# Patient Record
Sex: MALE | Race: WHITE | Employment: OTHER | URBAN - METROPOLITAN AREA
[De-identification: names, ages, dates, MRNs, and addresses within clinical notes are randomized per-mention and may not be internally consistent; named-entity substitution may affect disease eponyms.]

---

## 2018-05-24 LAB
ESTIMATED AVERAGE GLUCOSE: 111 MG/DL
HBA1C MFR BLD: 5.5 % (ref 4.4–6.4)
HCT VFR BLD CALC: 43.8 % (ref 40–49)
HEMOGLOBIN: 14.3 GM/DL (ref 13.5–16.5)
MCH RBC QN AUTO: 30.5 PG (ref 27.5–33)
MCHC RBC AUTO-ENTMCNC: 32.7 GM/DL (ref 33–36)
MCV RBC AUTO: 93.4 CU MIC (ref 80–97)
PDW BLD-RTO: 13.7 % (ref 12–16)
PLATELET # BLD: 155 TH/CMM (ref 150–400)
RBC # BLD: 4.69 MIL/CMM (ref 4.5–6)
WBC # BLD: 6.5 TH/CMM (ref 4.4–10.5)

## 2019-08-12 ENCOUNTER — HOSPITAL ENCOUNTER (OUTPATIENT)
Age: 69
Setting detail: SPECIMEN
Discharge: HOME | End: 2019-08-12
Payer: MEDICARE

## 2019-08-14 LAB
CULTURE: ABNORMAL
DIRECT EXAM: ABNORMAL
DIRECT EXAM: ABNORMAL
Lab: ABNORMAL
SPECIMEN DESCRIPTION: ABNORMAL

## 2020-03-11 ENCOUNTER — HOSPITAL ENCOUNTER (OUTPATIENT)
Age: 70
Setting detail: SPECIMEN
Discharge: HOME | End: 2020-03-11
Payer: MEDICARE

## 2021-10-04 ENCOUNTER — HOSPITAL ENCOUNTER (EMERGENCY)
Age: 71
Discharge: HOME | End: 2021-10-04
Payer: MEDICARE

## 2021-10-04 ENCOUNTER — APPOINTMENT (OUTPATIENT)
Dept: GENERAL RADIOLOGY | Age: 71
End: 2021-10-04
Payer: MEDICARE

## 2021-10-04 VITALS
WEIGHT: 173 LBS | TEMPERATURE: 98 F | HEART RATE: 61 BPM | DIASTOLIC BLOOD PRESSURE: 87 MMHG | BODY MASS INDEX: 25.62 KG/M2 | RESPIRATION RATE: 19 BRPM | SYSTOLIC BLOOD PRESSURE: 178 MMHG | OXYGEN SATURATION: 98 % | HEIGHT: 69 IN

## 2021-10-04 DIAGNOSIS — S62.610B OPEN DISPLACED FRACTURE OF PROXIMAL PHALANX OF RIGHT INDEX FINGER, INITIAL ENCOUNTER: Primary | ICD-10-CM

## 2021-10-04 PROCEDURE — 29130 APPL FINGER SPLINT STATIC: CPT

## 2021-10-04 PROCEDURE — 99213 OFFICE O/P EST LOW 20 MIN: CPT | Performed by: EMERGENCY MEDICINE

## 2021-10-04 PROCEDURE — 99214 OFFICE O/P EST MOD 30 MIN: CPT

## 2021-10-04 PROCEDURE — 96372 THER/PROPH/DIAG INJ SC/IM: CPT

## 2021-10-04 PROCEDURE — 73130 X-RAY EXAM OF HAND: CPT

## 2021-10-04 PROCEDURE — 6360000002 HC RX W HCPCS: Performed by: EMERGENCY MEDICINE

## 2021-10-04 RX ORDER — CEFAZOLIN SODIUM 1 G/3ML
1000 INJECTION, POWDER, FOR SOLUTION INTRAMUSCULAR; INTRAVENOUS ONCE
Status: COMPLETED | OUTPATIENT
Start: 2021-10-04 | End: 2021-10-04

## 2021-10-04 RX ORDER — LIDOCAINE HYDROCHLORIDE 10 MG/ML
INJECTION, SOLUTION EPIDURAL; INFILTRATION; INTRACAUDAL; PERINEURAL
Status: DISCONTINUED
Start: 2021-10-04 | End: 2021-10-04 | Stop reason: HOSPADM

## 2021-10-04 RX ADMIN — CEFAZOLIN 1000 MG: 1 INJECTION, POWDER, FOR SOLUTION INTRAMUSCULAR; INTRAVENOUS at 13:03

## 2021-10-04 ASSESSMENT — ENCOUNTER SYMPTOMS: COUGH: 0

## 2021-10-04 NOTE — ED PROVIDER NOTES
[facility]  Urgent Care Encounter       CHIEF COMPLAINT       Chief Complaint   Patient presents with    Hand Injury       Nurses Notes reviewed and I agree except as noted in the HPI. HISTORY OF PRESENT ILLNESS   Kareen Summers is a 66 y.o. male who presents for complaints of an injury to his right index finger. Patient states that he was using a pulley system at a local gym. When he pulled the pin out to adjust the weight, the bar that he used to pull down came crashing down and pinched his finger between the seat and the bar. Patient has a deformity to the right index finger. There is a laceration to the dorsum of the right index finger. Patient is having difficulty flexing and extending his right index finger. He states the tip of his finger feels numb. He reports the incident happened approximately 10:30 AM this morning. HPI    REVIEW OF SYSTEMS     Review of Systems   Constitutional: Negative for diaphoresis, fatigue and fever. Respiratory: Negative for cough. Cardiovascular: Negative for chest pain. Musculoskeletal: Positive for arthralgias (right index finger ). Skin: Positive for wound (laceration right index finger  ). Neurological: Negative for dizziness and light-headedness. Psychiatric/Behavioral: Negative for behavioral problems. PAST MEDICAL HISTORY         Diagnosis Date    Arrhythmia     Ganglion cyst     GERD (gastroesophageal reflux disease) 4/15/2014    Headache 1/15/2014    Hyperlipidemia     Hypertension     Major depression 12/15/2014    Osteoarthritis     Restless legs syndrome     RLS (restless legs syndrome)        SURGICALHISTORY     Patient  has a past surgical history that includes Testicle surgery; Cholecystectomy, laparoscopic; Cholecystectomy, laparoscopic; back surgery; and Neck surgery.     CURRENT MEDICATIONS       Previous Medications    AMLODIPINE (NORVASC) 5 MG TABLET    TAKE ONE TABLET BY MOUTH ONCE DAILY ASPIRIN 325 MG TABLET    Take 325 mg by mouth daily. BENAZEPRIL (LOTENSIN) 40 MG TABLET    Take 1 tablet by mouth daily    BUPROPION (WELLBUTRIN) 75 MG TABLET    TAKE ONE TABLET BY MOUTH ONCE DAILY    EZETIMIBE (ZETIA) 10 MG TABLET    Take 10 mg by mouth daily    FISH OIL-OMEGA-3 FATTY ACIDS 1000 MG CAPSULE    Take 1,000 mg by mouth 2 times daily. GABAPENTIN (NEURONTIN) 300 MG CAPSULE    TAKE ONE CAPSULE BY MOUTH THREE TIMES DAILY    GLUCOSAMINE SULFATE 750 MG TABS    Take 3 tablets by mouth daily. HYDROCODONE-ACETAMINOPHEN (NORCO) 5-325 MG PER TABLET    Take 1 tablet by mouth every 8 hours as needed for Pain    ISOSORBIDE MONONITRATE (IMDUR) 30 MG EXTENDED RELEASE TABLET    Take 1 tablet by mouth daily    MECLIZINE (ANTIVERT) 25 MG TABLET    TAKE ONE TABLET BY MOUTH EVERY 6 HOURS AS NEEDED FOR DIZZINESS    MELATONIN 3 MG TABS TABLET    Take 1 tablet by mouth daily    NITROGLYCERIN (NITROSTAT) 0.4 MG SL TABLET    Place 1 tablet under the tongue every 5 minutes as needed    PANTOPRAZOLE (PROTONIX) 40 MG TABLET    Take 40 mg by mouth daily    PRAVASTATIN (PRAVACHOL) 40 MG TABLET    TAKE ONE TABLET BY MOUTH ONCE DAILY    RANITIDINE (ZANTAC) 150 MG TABLET    TAKE 1 TABLET TWICE DAILY    SERTRALINE (ZOLOFT) 50 MG TABLET    Take 1 tablet by mouth daily    SOTALOL (BETAPACE) 80 MG TABLET    Take 1 tablet by mouth daily. THERAPEUTIC MULTIVITAMIN-MINERALS (THERAGRAN-M) TABLET    Take 1 tablet by mouth daily. ALLERGIES     Patient is is allergic to sildenafil citrate, baycol [cerivastatin sodium], cetirizine & related, cialis [tadalafil], crestor [rosuvastatin calcium], lipitor, zocor [simvastatin], and ropinirole hcl.     Patients   Immunization History   Administered Date(s) Administered    COVID-19, Moderna, PF, 100mcg/0.5mL 02/22/2021, 03/22/2021    Influenza Whole 09/29/2011    Pneumococcal Conjugate 13-valent (Garnett Brunner) 07/14/2016    Tdap (Boostrix, Adacel) 07/14/2016    Zoster Live (Zostavax) 07/14/2016       FAMILY HISTORY     Patient's family history includes Cancer in his brother and sister; Heart Disease in his father and mother; High Blood Pressure in his father and mother. SOCIAL HISTORY     Patient  reports that he has never smoked. He has never used smokeless tobacco. He reports that he does not drink alcohol and does not use drugs. PHYSICAL EXAM     ED TRIAGE VITALS  BP: (!) 178/87, Temp: 98 °F (36.7 °C), Pulse: 61, Resp: 19, SpO2: 98 %,Estimated body mass index is 25.55 kg/m² as calculated from the following:    Height as of this encounter: 5' 9\" (1.753 m). Weight as of this encounter: 173 lb (78.5 kg). ,No LMP for male patient. Physical Exam  Constitutional:       General: He is not in acute distress. Appearance: He is normal weight. He is not ill-appearing. HENT:      Head: Normocephalic. Cardiovascular:      Rate and Rhythm: Normal rate. Pulses: Normal pulses. Pulmonary:      Effort: Pulmonary effort is normal.   Musculoskeletal:      Right hand: Deformity (right index finger) and tenderness present. Decreased range of motion. Hands:    Skin:     General: Skin is warm and dry. Capillary Refill: Capillary refill takes less than 2 seconds. Neurological:      General: No focal deficit present. Mental Status: He is alert. Psychiatric:         Mood and Affect: Mood normal.         Behavior: Behavior normal.         DIAGNOSTIC RESULTS     Labs:No results found for this visit on 10/04/21. IMAGING:    XR HAND RIGHT (MIN 3 VIEWS)   Final Result   Comminuted displaced angulated fracture proximal phalanx second digit            **This report has been created using voice recognition software. It may contain minor errors which are inherent in voice recognition technology. **      Final report electronically signed by Dr. Tc Weeks on 10/4/2021 1:08 PM            EKG:      URGENT CARE COURSE:     Vitals:    10/04/21 1239   BP: (!) 178/87 Pulse: 61   Resp: 19   Temp: 98 °F (36.7 °C)   SpO2: 98%   Weight: 173 lb (78.5 kg)   Height: 5' 9\" (1.753 m)       Medications   lidocaine PF 1 % injection (has no administration in time range)   ceFAZolin (ANCEF) injection 1,000 mg (1,000 mg IntraMUSCular Given 10/4/21 1303)            PROCEDURES:  None    FINAL IMPRESSION      1. Open displaced fracture of proximal phalanx of right index finger, initial encounter          DISPOSITION/ PLAN     Patient presents for open fracture right index finger, proximal phalanx. I contacted Raquel Brunner, NP orthopedics. I advised that we administered Ancef at the urgent care. She advised to send the patient to walk-in clinic at orthopedic [facility] for further care. Patient was placed in AlumaFoam splint in a position of comfort. Patient will be discharged and sent to orthopedic [place] advised not to eat or drink anything prior to being seen.       PATIENT REFERRED TO:  Erin Hooker MD  [address] [place] [address] / [facility] [id number]      DISCHARGE MEDICATIONS:  New Prescriptions    No medications on file       Discontinued Medications    No medications on file       Current Discharge Medication List          Sonia Cloud, APRN - CNP    (Please note that portions of this note were completed with a voice recognition program. Efforts were made to edit the dictations but occasionally words are mis-transcribed.)          CHACHA Ferrer CNP  10/04/21 8705

## 2021-10-04 NOTE — ED TRIAGE NOTES
Pt presents to  with c/o right finger injury following working out at the gym today around 1000. Pt reports he was adjusting the weight on the pull down bar when the pull down bar fell, smashing his finger. Pt presents with a laceration to finger and a deformity. Pt cannot bend or straighten finger upon assessment. Pt does report numbness and tingling.

## 2021-12-23 ENCOUNTER — PROCEDURE VISIT (OUTPATIENT)
Dept: NEUROLOGY | Age: 71
End: 2021-12-23
Payer: MEDICARE

## 2021-12-23 DIAGNOSIS — R20.0 BILATERAL HAND NUMBNESS: Primary | ICD-10-CM

## 2021-12-23 DIAGNOSIS — M54.12 CERVICAL RADICULOPATHY: ICD-10-CM

## 2021-12-23 DIAGNOSIS — G56.03 BILATERAL CARPAL TUNNEL SYNDROME: ICD-10-CM

## 2021-12-23 PROCEDURE — 95886 MUSC TEST DONE W/N TEST COMP: CPT | Performed by: PSYCHIATRY & NEUROLOGY

## 2021-12-23 PROCEDURE — 95911 NRV CNDJ TEST 9-10 STUDIES: CPT | Performed by: PSYCHIATRY & NEUROLOGY

## 2022-05-23 ENCOUNTER — HOSPITAL ENCOUNTER (OUTPATIENT)
Age: 72
Setting detail: SPECIMEN
Discharge: HOME | End: 2022-05-23

## 2022-05-24 LAB
CRYPTOSPORIDIUM ANTIGEN STOOL: NEGATIVE
GIARDIA ANTIGEN STOOL: NEGATIVE
SOURCE: NORMAL

## 2022-10-06 ENCOUNTER — HOSPITAL ENCOUNTER (OUTPATIENT)
Age: 72
Setting detail: SPECIMEN
Discharge: HOME | End: 2022-10-06

## 2022-10-08 LAB
CULTURE: ABNORMAL
CULTURE: ABNORMAL
DIRECT EXAM: ABNORMAL
DIRECT EXAM: ABNORMAL
SPECIMEN DESCRIPTION: ABNORMAL

## 2023-02-23 ENCOUNTER — HOSPITAL ENCOUNTER (OUTPATIENT)
Age: 73
Setting detail: SPECIMEN
Discharge: HOME | End: 2023-02-23

## 2023-02-23 LAB
ALBUMIN SERPL-MCNC: 4.3 G/DL (ref 3.5–5.2)
ALBUMIN/GLOBULIN RATIO: 1.5 (ref 1–2.5)
ALP SERPL-CCNC: 112 U/L (ref 40–129)
ALT SERPL-CCNC: 16 U/L (ref 5–41)
ANION GAP SERPL CALCULATED.3IONS-SCNC: 11 MMOL/L (ref 9–17)
AST SERPL-CCNC: 21 U/L
BILIRUB SERPL-MCNC: 0.5 MG/DL (ref 0.3–1.2)
BUN SERPL-MCNC: 17 MG/DL (ref 8–23)
CALCIUM SERPL-MCNC: 9.2 MG/DL (ref 8.6–10.4)
CHLORIDE SERPL-SCNC: 106 MMOL/L (ref 98–107)
CO2 SERPL-SCNC: 25 MMOL/L (ref 20–31)
CREAT SERPL-MCNC: 1.15 MG/DL (ref 0.7–1.2)
CRP SERPL HS-MCNC: 4.4 MG/L (ref 0–5)
ERYTHROCYTE [SEDIMENTATION RATE] IN BLOOD BY WESTERGREN METHOD: 2 MM/HR (ref 0–20)
GFR SERPL CREATININE-BSD FRML MDRD: >60 ML/MIN/1.73M2
GLUCOSE SERPL-MCNC: 85 MG/DL (ref 70–99)
POTASSIUM SERPL-SCNC: 4.9 MMOL/L (ref 3.7–5.3)
PROT SERPL-MCNC: 7.1 G/DL (ref 6.4–8.3)
RHEUMATOID FACTOR: <10 IU/ML
SODIUM SERPL-SCNC: 142 MMOL/L (ref 135–144)

## 2023-02-25 LAB
ANA SER QL IA: NEGATIVE
DSDNA IGG SER QL IA: 1.1 IU/ML
NUCLEAR IGG SER IA-RTO: 0.2 U/ML

## 2023-06-20 ENCOUNTER — HOSPITAL ENCOUNTER (OUTPATIENT)
Age: 73
Setting detail: SPECIMEN
Discharge: HOME | End: 2023-06-20

## 2023-06-20 LAB
ALBUMIN SERPL-MCNC: 4.1 G/DL (ref 3.5–5.2)
ALBUMIN/GLOB SERPL: 1.4 {RATIO} (ref 1–2.5)
ALP SERPL-CCNC: 93 U/L (ref 40–129)
ALT SERPL-CCNC: 13 U/L (ref 5–41)
ANION GAP SERPL CALCULATED.3IONS-SCNC: 15 MMOL/L (ref 9–17)
AST SERPL-CCNC: 15 U/L
BILIRUB SERPL-MCNC: 0.6 MG/DL (ref 0.3–1.2)
BUN SERPL-MCNC: 26 MG/DL (ref 8–23)
CALCIUM SERPL-MCNC: 9.5 MG/DL (ref 8.6–10.4)
CHLORIDE SERPL-SCNC: 108 MMOL/L (ref 98–107)
CHOLEST SERPL-MCNC: 139 MG/DL
CHOLESTEROL/HDL RATIO: 3
CO2 SERPL-SCNC: 22 MMOL/L (ref 20–31)
CREAT SERPL-MCNC: 1.17 MG/DL (ref 0.7–1.2)
ERYTHROCYTE [DISTWIDTH] IN BLOOD BY AUTOMATED COUNT: 12.4 % (ref 11.8–14.4)
FOLATE SERPL-MCNC: >20 NG/ML
GFR SERPL CREATININE-BSD FRML MDRD: >60 ML/MIN/1.73M2
GLUCOSE SERPL-MCNC: 115 MG/DL (ref 70–99)
HCT VFR BLD AUTO: 48 % (ref 40.7–50.3)
HDLC SERPL-MCNC: 47 MG/DL
HGB BLD-MCNC: 15.3 G/DL (ref 13–17)
LDLC SERPL CALC-MCNC: 59 MG/DL (ref 0–130)
MCH RBC QN AUTO: 29.6 PG (ref 25.2–33.5)
MCHC RBC AUTO-ENTMCNC: 31.9 G/DL (ref 28.4–34.8)
MCV RBC AUTO: 92.8 FL (ref 82.6–102.9)
NRBC AUTOMATED: 0 PER 100 WBC
PLATELET # BLD AUTO: 193 K/UL (ref 138–453)
PMV BLD AUTO: 12 FL (ref 8.1–13.5)
POTASSIUM SERPL-SCNC: 4.8 MMOL/L (ref 3.7–5.3)
PROT SERPL-MCNC: 7.1 G/DL (ref 6.4–8.3)
PSA SERPL-MCNC: 3.49 NG/ML
RBC # BLD AUTO: 5.17 M/UL (ref 4.21–5.77)
SODIUM SERPL-SCNC: 145 MMOL/L (ref 135–144)
TRIGL SERPL-MCNC: 166 MG/DL
TSH SERPL-MCNC: 2.5 UIU/ML (ref 0.3–5)
VIT B12 SERPL-MCNC: 1998 PG/ML (ref 232–1245)
WBC OTHER # BLD: 7.8 K/UL (ref 3.5–11.3)

## 2024-05-10 ENCOUNTER — HOSPITAL ENCOUNTER (OUTPATIENT)
Age: 74
End: 2024-05-10

## 2024-08-26 ENCOUNTER — OFFICE VISIT (OUTPATIENT)
Dept: ENT CLINIC | Age: 74
End: 2024-08-26
Payer: MEDICARE

## 2024-08-26 ENCOUNTER — TELEPHONE (OUTPATIENT)
Dept: ENT CLINIC | Age: 74
End: 2024-08-26

## 2024-08-26 VITALS
SYSTOLIC BLOOD PRESSURE: 136 MMHG | DIASTOLIC BLOOD PRESSURE: 74 MMHG | HEART RATE: 65 BPM | WEIGHT: 187.4 LBS | HEIGHT: 69 IN | RESPIRATION RATE: 14 BRPM | OXYGEN SATURATION: 97 % | BODY MASS INDEX: 27.76 KG/M2 | TEMPERATURE: 97.7 F

## 2024-08-26 DIAGNOSIS — J39.8 TRACHEOBRONCHOMALACIA DETERMINED BY BRONCHOSCOPY: ICD-10-CM

## 2024-08-26 DIAGNOSIS — R06.09 DYSPNEA ON EXERTION: Primary | ICD-10-CM

## 2024-08-26 PROCEDURE — 3078F DIAST BP <80 MM HG: CPT | Performed by: OTOLARYNGOLOGY

## 2024-08-26 PROCEDURE — 1036F TOBACCO NON-USER: CPT | Performed by: OTOLARYNGOLOGY

## 2024-08-26 PROCEDURE — 3017F COLORECTAL CA SCREEN DOC REV: CPT | Performed by: OTOLARYNGOLOGY

## 2024-08-26 PROCEDURE — 1123F ACP DISCUSS/DSCN MKR DOCD: CPT | Performed by: OTOLARYNGOLOGY

## 2024-08-26 PROCEDURE — G8428 CUR MEDS NOT DOCUMENT: HCPCS | Performed by: OTOLARYNGOLOGY

## 2024-08-26 PROCEDURE — G8419 CALC BMI OUT NRM PARAM NOF/U: HCPCS | Performed by: OTOLARYNGOLOGY

## 2024-08-26 PROCEDURE — 99203 OFFICE O/P NEW LOW 30 MIN: CPT | Performed by: OTOLARYNGOLOGY

## 2024-08-26 PROCEDURE — 3075F SYST BP GE 130 - 139MM HG: CPT | Performed by: OTOLARYNGOLOGY

## 2024-08-26 RX ORDER — AMITRIPTYLINE HYDROCHLORIDE 50 MG/1
50 TABLET ORAL NIGHTLY
COMMUNITY
Start: 2024-07-09

## 2024-08-26 RX ORDER — SOTALOL HYDROCHLORIDE 80 MG/1
80 TABLET ORAL 2 TIMES DAILY
COMMUNITY

## 2024-08-26 RX ORDER — OMEPRAZOLE 40 MG/1
40 CAPSULE, DELAYED RELEASE ORAL DAILY
COMMUNITY

## 2024-08-26 RX ORDER — SULFASALAZINE 500 MG/1
500 TABLET ORAL DAILY
COMMUNITY
Start: 2024-07-18

## 2024-08-26 RX ORDER — HYDROXYZINE HYDROCHLORIDE 10 MG/1
TABLET, FILM COATED ORAL
COMMUNITY

## 2024-08-26 RX ORDER — PRASUGREL 10 MG/1
TABLET, FILM COATED ORAL
COMMUNITY
Start: 2024-08-25

## 2024-08-26 RX ORDER — TERBINAFINE HYDROCHLORIDE 250 MG/1
250 TABLET ORAL DAILY
COMMUNITY
Start: 2024-06-24

## 2024-08-26 RX ORDER — PHENOL 1.4 %
1 AEROSOL, SPRAY (ML) MUCOUS MEMBRANE DAILY
COMMUNITY

## 2024-08-26 RX ORDER — ISOSORBIDE MONONITRATE 60 MG/1
60 TABLET, EXTENDED RELEASE ORAL EVERY MORNING
COMMUNITY
Start: 2024-07-18

## 2024-08-26 RX ORDER — GABAPENTIN 400 MG/1
400 CAPSULE ORAL 3 TIMES DAILY
COMMUNITY
Start: 2024-08-09 | End: 2024-08-26

## 2024-08-26 NOTE — PROGRESS NOTES
coughing or inspiratory stridor.  On anterior inspection the patient's nasal airways were bilaterally patent with mild to moderate nasal septal deviation to the right and bilateral inferior turbinate hypertrophy without obstruction.  He had positive nasal valving with the right side collapsing less than the left because of less airflow.  On anterior inspection the patient had a class I Mallampati aperture.  His neck was free of adenopathy and thyromegaly.  His breath sounds were distant and polyphonic rhonchi were vaguely audible right worse than left.  He was heard to cough multiple times and some of his coughing was rhonchorous.    Vitals reviewed.    No results found.   Lab Results   Component Value Date/Time     05/13/2024 09:20 AM     02/14/2024 10:35 AM     02/13/2024 09:29 AM    K 4.4 05/13/2024 09:20 AM    K 4.3 02/14/2024 10:35 AM    K 4.8 02/13/2024 09:29 AM     05/13/2024 09:20 AM     02/14/2024 10:35 AM     02/13/2024 09:29 AM    CO2 29 05/13/2024 09:20 AM    CO2 24 02/14/2024 10:35 AM    CO2 31 02/13/2024 09:29 AM    BUN 15 05/13/2024 09:20 AM    BUN 19 02/14/2024 10:35 AM    BUN 16 02/13/2024 09:29 AM    CREATININE 1.01 05/13/2024 09:20 AM    CREATININE 1.07 02/14/2024 10:35 AM    CREATININE 1.09 02/13/2024 09:29 AM    CALCIUM 8.80 05/13/2024 09:20 AM    CALCIUM 8.50 02/14/2024 10:35 AM    CALCIUM 9.00 02/13/2024 09:29 AM    BILITOT 0.6 05/13/2024 09:20 AM    BILITOT 0.8 02/13/2024 09:29 AM    BILITOT 0.6 08/29/2023 08:42 AM    ALKPHOS 51 05/13/2024 09:20 AM    ALKPHOS 54 02/13/2024 09:29 AM    ALKPHOS 70 08/29/2023 08:42 AM    AST 16 05/13/2024 09:20 AM    AST 17 02/13/2024 09:29 AM    AST 17 08/29/2023 08:42 AM    ALT 11 05/13/2024 09:20 AM    ALT 11 02/13/2024 09:29 AM    ALT 13 12/05/2023 10:03 AM       All of the past medical history, past surgical history, family history,social history, allergies and current medications were reviewed with the  voice recognition technology.**

## 2024-08-26 NOTE — TELEPHONE ENCOUNTER
Patient is scheduled 12/4/2024 @ 11:00 am.  Dr Meyers would like him sooner for a quick bronch.    I have added him to the wait list and will watch for a sooner appointment.

## 2024-09-01 ENCOUNTER — HOSPITAL ENCOUNTER (OUTPATIENT)
Dept: CT IMAGING | Age: 74
Discharge: HOME | End: 2024-09-01
Attending: RADIOLOGY

## 2024-09-01 DIAGNOSIS — Z00.6 ENCOUNTER FOR EXAMINATION FOR NORMAL COMPARISON OR CONTROL IN CLINICAL RESEARCH PROGRAM: ICD-10-CM

## 2024-09-18 ENCOUNTER — HOSPITAL ENCOUNTER (OUTPATIENT)
Dept: RESPIRATORY THERAPY | Age: 74
Discharge: HOME | End: 2024-09-18
Payer: MEDICARE

## 2024-09-18 DIAGNOSIS — J44.9 CHRONIC OBSTRUCTIVE PULMONARY DISEASE, UNSPECIFIED COPD TYPE (HCC): ICD-10-CM

## 2024-09-18 PROCEDURE — 94762 N-INVAS EAR/PLS OXIMTRY CONT: CPT

## 2024-09-30 ENCOUNTER — OFFICE VISIT (OUTPATIENT)
Dept: ENT CLINIC | Age: 74
End: 2024-09-30

## 2024-09-30 VITALS
HEART RATE: 84 BPM | WEIGHT: 185.4 LBS | BODY MASS INDEX: 27.38 KG/M2 | TEMPERATURE: 97.5 F | DIASTOLIC BLOOD PRESSURE: 64 MMHG | RESPIRATION RATE: 18 BRPM | SYSTOLIC BLOOD PRESSURE: 140 MMHG | OXYGEN SATURATION: 97 %

## 2024-09-30 DIAGNOSIS — J39.8 TRACHEOBRONCHOMALACIA DETERMINED BY BRONCHOSCOPY: ICD-10-CM

## 2024-09-30 DIAGNOSIS — R06.09 DYSPNEA ON EXERTION: Primary | ICD-10-CM

## 2024-09-30 RX ORDER — ZOLPIDEM TARTRATE 5 MG/1
5 TABLET ORAL DAILY
COMMUNITY

## 2024-09-30 NOTE — PROGRESS NOTES
1 tablet by mouth daily      isosorbide mononitrate (IMDUR) 60 MG extended release tablet Take 1 tablet by mouth every morning      ISOSORBIDE PO Take 60 mg by mouth daily      calcium carbonate 600 MG TABS tablet Take 1 tablet by mouth daily      sotalol (BETAPACE) 80 MG tablet Take 1 tablet by mouth 2 times daily      pravastatin (PRAVACHOL) 40 MG tablet TAKE ONE TABLET BY MOUTH ONCE DAILY 30 tablet 5    gabapentin (NEURONTIN) 300 MG capsule TAKE ONE CAPSULE BY MOUTH THREE TIMES DAILY 90 capsule 5    melatonin 3 MG TABS tablet Take 1 tablet by mouth daily 30 tablet 3    ezetimibe (ZETIA) 10 MG tablet Take 1 tablet by mouth daily      benazepril (LOTENSIN) 40 MG tablet Take 1 tablet by mouth daily 30 tablet 5    amLODIPine (NORVASC) 5 MG tablet TAKE ONE TABLET BY MOUTH ONCE DAILY 90 tablet 0    fish oil-omega-3 fatty acids 1000 MG capsule Take 1 capsule by mouth 2 times daily      therapeutic multivitamin-minerals (THERAGRAN-M) tablet Take 1 tablet by mouth daily       No current facility-administered medications for this visit.     Past Medical History:   Diagnosis Date    Arrhythmia     Dizziness     Ganglion cyst     GERD (gastroesophageal reflux disease) 04/15/2014    Headache 01/15/2014    Hyperlipidemia     Hypertension     Major depression 12/15/2014    Osteoarthritis     Restless legs syndrome     RLS (restless legs syndrome)     SOB (shortness of breath)       Past Surgical History:   Procedure Laterality Date    BACK SURGERY      CHOLECYSTECTOMY, LAPAROSCOPIC      CHOLECYSTECTOMY, LAPAROSCOPIC      LUNG REMOVAL, TOTAL N/A     NECK SURGERY      TESTICLE SURGERY       Family History   Problem Relation Age of Onset    High Blood Pressure Mother     Heart Disease Mother         chf    Heart Disease Father         chf    High Blood Pressure Father     Cancer Sister         lymph noids    Cancer Brother         agent orange     Social History     Tobacco Use    Smoking status: Former     Current packs/day: 1.00

## 2024-12-04 ENCOUNTER — OFFICE VISIT (OUTPATIENT)
Dept: ENT CLINIC | Age: 74
End: 2024-12-04
Payer: MEDICARE

## 2024-12-04 VITALS
SYSTOLIC BLOOD PRESSURE: 132 MMHG | HEIGHT: 69 IN | WEIGHT: 179.4 LBS | BODY MASS INDEX: 26.57 KG/M2 | HEART RATE: 64 BPM | DIASTOLIC BLOOD PRESSURE: 78 MMHG | TEMPERATURE: 97.5 F | OXYGEN SATURATION: 97 %

## 2024-12-04 DIAGNOSIS — R06.09 DYSPNEA ON EXERTION: ICD-10-CM

## 2024-12-04 DIAGNOSIS — J39.8 TRACHEOBRONCHOMALACIA DETERMINED BY BRONCHOSCOPY: ICD-10-CM

## 2024-12-04 DIAGNOSIS — Z01.818 PRE-OP TESTING: Primary | ICD-10-CM

## 2024-12-04 DIAGNOSIS — J39.8 TRACHEOBRONCHOMALACIA DETERMINED BY BRONCHOSCOPY: Primary | ICD-10-CM

## 2024-12-04 PROCEDURE — G8419 CALC BMI OUT NRM PARAM NOF/U: HCPCS | Performed by: OTOLARYNGOLOGY

## 2024-12-04 PROCEDURE — 99215 OFFICE O/P EST HI 40 MIN: CPT | Performed by: OTOLARYNGOLOGY

## 2024-12-04 PROCEDURE — 1036F TOBACCO NON-USER: CPT | Performed by: OTOLARYNGOLOGY

## 2024-12-04 PROCEDURE — G8484 FLU IMMUNIZE NO ADMIN: HCPCS | Performed by: OTOLARYNGOLOGY

## 2024-12-04 PROCEDURE — 1123F ACP DISCUSS/DSCN MKR DOCD: CPT | Performed by: OTOLARYNGOLOGY

## 2024-12-04 PROCEDURE — 3017F COLORECTAL CA SCREEN DOC REV: CPT | Performed by: OTOLARYNGOLOGY

## 2024-12-04 PROCEDURE — G8427 DOCREV CUR MEDS BY ELIG CLIN: HCPCS | Performed by: OTOLARYNGOLOGY

## 2024-12-04 PROCEDURE — 3078F DIAST BP <80 MM HG: CPT | Performed by: OTOLARYNGOLOGY

## 2024-12-04 PROCEDURE — 1159F MED LIST DOCD IN RCRD: CPT | Performed by: OTOLARYNGOLOGY

## 2024-12-04 PROCEDURE — 3075F SYST BP GE 130 - 139MM HG: CPT | Performed by: OTOLARYNGOLOGY

## 2024-12-04 NOTE — PROGRESS NOTES
patient (or guardian, if applicable) and other individuals in attendance at the appointment consented to the use of AI, including the recording.         **This report has been created using voice recognition software. It may contain minor errors which are inherent in voice recognition technology.**

## 2024-12-05 ENCOUNTER — PREP FOR PROCEDURE (OUTPATIENT)
Dept: ENT CLINIC | Age: 74
End: 2024-12-05

## 2024-12-10 ENCOUNTER — HOSPITAL ENCOUNTER (OUTPATIENT)
Age: 74
Discharge: HOME | End: 2024-12-10
Payer: MEDICARE

## 2024-12-10 ENCOUNTER — HOSPITAL ENCOUNTER (OUTPATIENT)
Dept: GENERAL RADIOLOGY | Age: 74
Discharge: HOME | End: 2024-12-10
Payer: MEDICARE

## 2024-12-10 DIAGNOSIS — J39.8 TRACHEOBRONCHOMALACIA DETERMINED BY BRONCHOSCOPY: ICD-10-CM

## 2024-12-10 DIAGNOSIS — R06.09 DYSPNEA ON EXERTION: ICD-10-CM

## 2024-12-10 DIAGNOSIS — Z01.818 PRE-OP TESTING: ICD-10-CM

## 2024-12-10 LAB
ALBUMIN SERPL BCG-MCNC: 4.1 G/DL (ref 3.5–5.1)
ALP SERPL-CCNC: 70 U/L (ref 38–126)
ALT SERPL W/O P-5'-P-CCNC: 13 U/L (ref 11–66)
ANION GAP SERPL CALC-SCNC: 11 MEQ/L (ref 8–16)
AST SERPL-CCNC: 19 U/L (ref 5–40)
BASOPHILS ABSOLUTE: 0 THOU/MM3 (ref 0–0.1)
BASOPHILS NFR BLD AUTO: 0.4 %
BILIRUB SERPL-MCNC: 0.4 MG/DL (ref 0.3–1.2)
BUN SERPL-MCNC: 14 MG/DL (ref 7–22)
CALCIUM SERPL-MCNC: 9.5 MG/DL (ref 8.5–10.5)
CHLORIDE SERPL-SCNC: 104 MEQ/L (ref 98–111)
CO2 SERPL-SCNC: 27 MEQ/L (ref 23–33)
CREAT SERPL-MCNC: 0.9 MG/DL (ref 0.4–1.2)
DEPRECATED RDW RBC AUTO: 43.6 FL (ref 35–45)
EOSINOPHIL NFR BLD AUTO: 2.6 %
EOSINOPHILS ABSOLUTE: 0.2 THOU/MM3 (ref 0–0.4)
ERYTHROCYTE [DISTWIDTH] IN BLOOD BY AUTOMATED COUNT: 12.8 % (ref 11.5–14.5)
GFR SERPL CREATININE-BSD FRML MDRD: 89 ML/MIN/1.73M2
GLUCOSE SERPL-MCNC: 87 MG/DL (ref 70–108)
HCT VFR BLD AUTO: 44.4 % (ref 42–52)
HGB BLD-MCNC: 14.3 GM/DL (ref 14–18)
IMM GRANULOCYTES # BLD AUTO: 0.02 THOU/MM3 (ref 0–0.07)
IMM GRANULOCYTES NFR BLD AUTO: 0.3 %
LYMPHOCYTES ABSOLUTE: 2.2 THOU/MM3 (ref 1–4.8)
LYMPHOCYTES NFR BLD AUTO: 30 %
MCH RBC QN AUTO: 29.9 PG (ref 26–33)
MCHC RBC AUTO-ENTMCNC: 32.2 GM/DL (ref 32.2–35.5)
MCV RBC AUTO: 92.7 FL (ref 80–94)
MONOCYTES ABSOLUTE: 0.6 THOU/MM3 (ref 0.4–1.3)
MONOCYTES NFR BLD AUTO: 8.9 %
NEUTROPHILS ABSOLUTE: 4.2 THOU/MM3 (ref 1.8–7.7)
NEUTROPHILS NFR BLD AUTO: 57.8 %
NRBC BLD AUTO-RTO: 0 /100 WBC
PLATELET # BLD AUTO: 189 THOU/MM3 (ref 130–400)
PMV BLD AUTO: 11.5 FL (ref 9.4–12.4)
POTASSIUM SERPL-SCNC: 4.7 MEQ/L (ref 3.5–5.2)
PROT SERPL-MCNC: 6.9 G/DL (ref 6.1–8)
RBC # BLD AUTO: 4.79 MILL/MM3 (ref 4.7–6.1)
SODIUM SERPL-SCNC: 142 MEQ/L (ref 135–145)
WBC # BLD AUTO: 7.2 THOU/MM3 (ref 4.8–10.8)

## 2024-12-10 PROCEDURE — 80053 COMPREHEN METABOLIC PANEL: CPT

## 2024-12-10 PROCEDURE — 36415 COLL VENOUS BLD VENIPUNCTURE: CPT

## 2024-12-10 PROCEDURE — 85025 COMPLETE CBC W/AUTO DIFF WBC: CPT

## 2024-12-10 PROCEDURE — 71046 X-RAY EXAM CHEST 2 VIEWS: CPT

## 2024-12-26 NOTE — FLOWSHEET NOTE
Pt uses CPAP machine at night.  Pt instructed to bring day of surgery.  Pt verbalized understanding.

## 2024-12-30 ENCOUNTER — TELEPHONE (OUTPATIENT)
Dept: ENT CLINIC | Age: 74
End: 2024-12-30

## 2024-12-30 NOTE — TELEPHONE ENCOUNTER
Patient called asking if he could get some medication sent in to pharmacy, he has a surgery on Friday and would like to see if he can start any medications beforehand. Patient states that his symptoms include coughing up , a lot of mucous almost choking on mucous he hasn't checked for a fever and hasn't checked for covid or flu test. Patient states he had symptoms starting Saturday 12/28. Please advise.

## 2024-12-30 NOTE — TELEPHONE ENCOUNTER
Recommend evaluation by PCP/UC and testing for some of these viruses ongoing. No indication to send in an antibiotic at this time pre-operatively based upon symptoms. Would advise an assessment by PCP/UC.

## 2024-12-30 NOTE — TELEPHONE ENCOUNTER
Called patient advising them as to what Conchita said. Patient verbalized understanding and thanked me.

## 2024-12-30 NOTE — TELEPHONE ENCOUNTER
Vladimir called to let us know he went to his PCP today and they ran some tests, negative for Covid but positive for the flu. They gave him some medications and he will call back in a couple days to let us know how he is doing. He is scheduled for laryngoscopy/tracheoscopy by Dr Meyers and Bronchoscopy with stents by Dr Mcnulty on Monday 1/6/25 in [facility].    Please advise if anything further needs done at this point.

## 2024-12-30 NOTE — TELEPHONE ENCOUNTER
Noted. Would recommend he call with an update around Wednesday/Thursday. Glad he got the testing and the meds (anticipate Tamiflu).

## 2025-01-02 ENCOUNTER — PREP FOR PROCEDURE (OUTPATIENT)
Dept: ENT CLINIC | Age: 75
End: 2025-01-02

## 2025-01-03 RX ORDER — SODIUM CHLORIDE 9 MG/ML
INJECTION, SOLUTION INTRAVENOUS PRN
Status: CANCELLED | OUTPATIENT
Start: 2025-01-03

## 2025-01-03 RX ORDER — SODIUM CHLORIDE 0.9 % (FLUSH) 0.9 %
5-40 SYRINGE (ML) INJECTION EVERY 12 HOURS SCHEDULED
Status: CANCELLED | OUTPATIENT
Start: 2025-01-03

## 2025-01-03 RX ORDER — IPRATROPIUM BROMIDE AND ALBUTEROL SULFATE 2.5; .5 MG/3ML; MG/3ML
1 SOLUTION RESPIRATORY (INHALATION) EVERY 4 HOURS PRN
Status: CANCELLED | OUTPATIENT
Start: 2025-01-06

## 2025-01-03 RX ORDER — SODIUM CHLORIDE 0.9 % (FLUSH) 0.9 %
5-40 SYRINGE (ML) INJECTION PRN
Status: CANCELLED | OUTPATIENT
Start: 2025-01-03

## 2025-01-06 ENCOUNTER — HOSPITAL ENCOUNTER (INPATIENT)
Age: 75
LOS: 6 days | Discharge: HOME | DRG: 202 | End: 2025-01-13
Attending: OTOLARYNGOLOGY | Admitting: OTOLARYNGOLOGY
Payer: COMMERCIAL

## 2025-01-06 ENCOUNTER — ANESTHESIA (OUTPATIENT)
Dept: OPERATING ROOM | Age: 75
End: 2025-01-06
Payer: COMMERCIAL

## 2025-01-06 ENCOUNTER — ANESTHESIA EVENT (OUTPATIENT)
Dept: OPERATING ROOM | Age: 75
End: 2025-01-06
Payer: COMMERCIAL

## 2025-01-06 ENCOUNTER — APPOINTMENT (OUTPATIENT)
Dept: GENERAL RADIOLOGY | Age: 75
DRG: 202 | End: 2025-01-06
Attending: OTOLARYNGOLOGY
Payer: COMMERCIAL

## 2025-01-06 DIAGNOSIS — R06.09 DYSPNEA ON EXERTION: ICD-10-CM

## 2025-01-06 DIAGNOSIS — J39.8 TRACHEOBRONCHOMALACIA DETERMINED BY BRONCHOSCOPY: Primary | ICD-10-CM

## 2025-01-06 DIAGNOSIS — J39.8 TRACHEOBRONCHOMALACIA: ICD-10-CM

## 2025-01-06 LAB
BACTERIA URNS QL MICRO: ABNORMAL /HPF
BILIRUB UR QL STRIP.AUTO: NEGATIVE
CASTS #/AREA URNS LPF: ABNORMAL /LPF
CASTS 2: ABNORMAL /LPF
CHARACTER UR: ABNORMAL
COLOR, UA: ABNORMAL
CRYSTALS URNS MICRO: ABNORMAL
EPITHELIAL CELLS, UA: ABNORMAL /HPF
GLUCOSE UR QL STRIP.AUTO: NEGATIVE MG/DL
HGB UR QL STRIP.AUTO: ABNORMAL
KETONES UR QL STRIP.AUTO: ABNORMAL
MISCELLANEOUS 2: ABNORMAL
NITRITE UR QL STRIP: NEGATIVE
PH UR STRIP.AUTO: 6.5 [PH] (ref 5–9)
PROT UR STRIP.AUTO-MCNC: 30 MG/DL
RBC URINE: > 200 /HPF
RENAL EPI CELLS #/AREA URNS HPF: ABNORMAL /[HPF]
SP GR UR REFRACT.AUTO: 1.01 (ref 1–1.03)
UROBILINOGEN, URINE: 0.2 EU/DL (ref 0–1)
WBC #/AREA URNS HPF: ABNORMAL /HPF
WBC #/AREA URNS HPF: ABNORMAL /[HPF]
YEAST LIKE FUNGI URNS QL MICRO: ABNORMAL

## 2025-01-06 PROCEDURE — 2500000003 HC RX 250 WO HCPCS: Performed by: NURSE ANESTHETIST, CERTIFIED REGISTERED

## 2025-01-06 PROCEDURE — 2780000010 HC IMPLANT OTHER: Performed by: OTOLARYNGOLOGY

## 2025-01-06 PROCEDURE — 6360000002 HC RX W HCPCS: Performed by: NURSE PRACTITIONER

## 2025-01-06 PROCEDURE — 6360000002 HC RX W HCPCS: Performed by: NURSE ANESTHETIST, CERTIFIED REGISTERED

## 2025-01-06 PROCEDURE — 2500000003 HC RX 250 WO HCPCS: Performed by: NURSE PRACTITIONER

## 2025-01-06 PROCEDURE — 6360000002 HC RX W HCPCS: Performed by: OTOLARYNGOLOGY

## 2025-01-06 PROCEDURE — 6370000000 HC RX 637 (ALT 250 FOR IP): Performed by: ANESTHESIOLOGY

## 2025-01-06 PROCEDURE — 3700000000 HC ANESTHESIA ATTENDED CARE: Performed by: OTOLARYNGOLOGY

## 2025-01-06 PROCEDURE — APPNB45 APP NON BILLABLE 31-45 MINUTES: Performed by: NURSE PRACTITIONER

## 2025-01-06 PROCEDURE — 7100000000 HC PACU RECOVERY - FIRST 15 MIN: Performed by: OTOLARYNGOLOGY

## 2025-01-06 PROCEDURE — 3600000014 HC SURGERY LEVEL 4 ADDTL 15MIN: Performed by: OTOLARYNGOLOGY

## 2025-01-06 PROCEDURE — 5A0955A ASSISTANCE WITH RESPIRATORY VENTILATION, GREATER THAN 96 CONSECUTIVE HOURS, HIGH NASAL FLOW/VELOCITY: ICD-10-PCS | Performed by: OTOLARYNGOLOGY

## 2025-01-06 PROCEDURE — 3700000001 HC ADD 15 MINUTES (ANESTHESIA): Performed by: OTOLARYNGOLOGY

## 2025-01-06 PROCEDURE — 2720000010 HC SURG SUPPLY STERILE: Performed by: OTOLARYNGOLOGY

## 2025-01-06 PROCEDURE — 6360000002 HC RX W HCPCS: Performed by: REGISTERED NURSE

## 2025-01-06 PROCEDURE — 71045 X-RAY EXAM CHEST 1 VIEW: CPT

## 2025-01-06 PROCEDURE — C1601 HC ENDOSCOPE, PULM, IMAGING/ILLUMINATION DEVICE: HCPCS | Performed by: OTOLARYNGOLOGY

## 2025-01-06 PROCEDURE — 2580000003 HC RX 258: Performed by: NURSE PRACTITIONER

## 2025-01-06 PROCEDURE — 7100000001 HC PACU RECOVERY - ADDTL 15 MIN: Performed by: OTOLARYNGOLOGY

## 2025-01-06 PROCEDURE — 2700000000 HC OXYGEN THERAPY PER DAY

## 2025-01-06 PROCEDURE — 94761 N-INVAS EAR/PLS OXIMETRY MLT: CPT

## 2025-01-06 PROCEDURE — 2709999900 HC NON-CHARGEABLE SUPPLY: Performed by: OTOLARYNGOLOGY

## 2025-01-06 PROCEDURE — 81001 URINALYSIS AUTO W/SCOPE: CPT

## 2025-01-06 PROCEDURE — 51798 US URINE CAPACITY MEASURE: CPT

## 2025-01-06 PROCEDURE — 6370000000 HC RX 637 (ALT 250 FOR IP): Performed by: NURSE PRACTITIONER

## 2025-01-06 PROCEDURE — 87086 URINE CULTURE/COLONY COUNT: CPT

## 2025-01-06 PROCEDURE — 2580000003 HC RX 258: Performed by: NURSE ANESTHETIST, CERTIFIED REGISTERED

## 2025-01-06 PROCEDURE — 3600000004 HC SURGERY LEVEL 4 BASE: Performed by: OTOLARYNGOLOGY

## 2025-01-06 PROCEDURE — 2580000003 HC RX 258: Performed by: OTOLARYNGOLOGY

## 2025-01-06 RX ORDER — PROPOFOL 10 MG/ML
INJECTION, EMULSION INTRAVENOUS
Status: DISCONTINUED | OUTPATIENT
Start: 2025-01-06 | End: 2025-01-06 | Stop reason: SDUPTHER

## 2025-01-06 RX ORDER — NALOXONE HYDROCHLORIDE 0.4 MG/ML
INJECTION, SOLUTION INTRAMUSCULAR; INTRAVENOUS; SUBCUTANEOUS PRN
Status: DISCONTINUED | OUTPATIENT
Start: 2025-01-06 | End: 2025-01-06 | Stop reason: HOSPADM

## 2025-01-06 RX ORDER — FENTANYL CITRATE 50 UG/ML
25 INJECTION, SOLUTION INTRAMUSCULAR; INTRAVENOUS EVERY 5 MIN PRN
Status: DISCONTINUED | OUTPATIENT
Start: 2025-01-06 | End: 2025-01-06 | Stop reason: HOSPADM

## 2025-01-06 RX ORDER — ONDANSETRON 2 MG/ML
INJECTION INTRAMUSCULAR; INTRAVENOUS
Status: DISCONTINUED | OUTPATIENT
Start: 2025-01-06 | End: 2025-01-06 | Stop reason: SDUPTHER

## 2025-01-06 RX ORDER — GABAPENTIN 300 MG/1
300 CAPSULE ORAL 3 TIMES DAILY
Status: DISCONTINUED | OUTPATIENT
Start: 2025-01-06 | End: 2025-01-06

## 2025-01-06 RX ORDER — SODIUM CHLORIDE FOR INHALATION 3 %
4 VIAL, NEBULIZER (ML) INHALATION PRN
Status: DISCONTINUED | OUTPATIENT
Start: 2025-01-06 | End: 2025-01-13 | Stop reason: HOSPADM

## 2025-01-06 RX ORDER — PRAVASTATIN SODIUM 40 MG
40 TABLET ORAL NIGHTLY
Status: DISCONTINUED | OUTPATIENT
Start: 2025-01-07 | End: 2025-01-13 | Stop reason: HOSPADM

## 2025-01-06 RX ORDER — FENTANYL CITRATE 50 UG/ML
INJECTION, SOLUTION INTRAMUSCULAR; INTRAVENOUS
Status: DISCONTINUED | OUTPATIENT
Start: 2025-01-06 | End: 2025-01-06 | Stop reason: SDUPTHER

## 2025-01-06 RX ORDER — LIDOCAINE HCL/PF 100 MG/5ML
SYRINGE (ML) INJECTION
Status: DISCONTINUED | OUTPATIENT
Start: 2025-01-06 | End: 2025-01-06 | Stop reason: SDUPTHER

## 2025-01-06 RX ORDER — HYDROXYZINE HYDROCHLORIDE 10 MG/1
10 TABLET, FILM COATED ORAL NIGHTLY PRN
Status: DISCONTINUED | OUTPATIENT
Start: 2025-01-06 | End: 2025-01-13 | Stop reason: HOSPADM

## 2025-01-06 RX ORDER — EPINEPHRINE 1 MG/ML
INJECTION, SOLUTION, CONCENTRATE INTRAVENOUS PRN
Status: DISCONTINUED | OUTPATIENT
Start: 2025-01-06 | End: 2025-01-06 | Stop reason: ALTCHOICE

## 2025-01-06 RX ORDER — SODIUM CHLORIDE 9 MG/ML
INJECTION, SOLUTION INTRAVENOUS PRN
Status: DISCONTINUED | OUTPATIENT
Start: 2025-01-06 | End: 2025-01-06 | Stop reason: HOSPADM

## 2025-01-06 RX ORDER — IPRATROPIUM BROMIDE AND ALBUTEROL SULFATE 2.5; .5 MG/3ML; MG/3ML
1 SOLUTION RESPIRATORY (INHALATION) EVERY 4 HOURS PRN
Status: DISCONTINUED | OUTPATIENT
Start: 2025-01-06 | End: 2025-01-06 | Stop reason: HOSPADM

## 2025-01-06 RX ORDER — SODIUM CHLORIDE 9 MG/ML
INJECTION, SOLUTION INTRAMUSCULAR; INTRAVENOUS; SUBCUTANEOUS PRN
Status: DISCONTINUED | OUTPATIENT
Start: 2025-01-06 | End: 2025-01-06 | Stop reason: ALTCHOICE

## 2025-01-06 RX ORDER — FENTANYL CITRATE 50 UG/ML
50 INJECTION, SOLUTION INTRAMUSCULAR; INTRAVENOUS EVERY 5 MIN PRN
Status: DISCONTINUED | OUTPATIENT
Start: 2025-01-06 | End: 2025-01-06 | Stop reason: HOSPADM

## 2025-01-06 RX ORDER — SULFASALAZINE 500 MG/1
500 TABLET ORAL DAILY
Status: DISCONTINUED | OUTPATIENT
Start: 2025-01-07 | End: 2025-01-13 | Stop reason: HOSPADM

## 2025-01-06 RX ORDER — SODIUM CHLORIDE 0.9 % (FLUSH) 0.9 %
5-40 SYRINGE (ML) INJECTION PRN
Status: DISCONTINUED | OUTPATIENT
Start: 2025-01-06 | End: 2025-01-13 | Stop reason: HOSPADM

## 2025-01-06 RX ORDER — CALCIUM CARBONATE 500(1250)
500 TABLET ORAL DAILY
Status: DISCONTINUED | OUTPATIENT
Start: 2025-01-07 | End: 2025-01-13 | Stop reason: HOSPADM

## 2025-01-06 RX ORDER — EZETIMIBE 10 MG/1
10 TABLET ORAL DAILY
Status: DISCONTINUED | OUTPATIENT
Start: 2025-01-07 | End: 2025-01-13 | Stop reason: HOSPADM

## 2025-01-06 RX ORDER — ZOLPIDEM TARTRATE 5 MG/1
5 TABLET ORAL DAILY
Status: DISCONTINUED | OUTPATIENT
Start: 2025-01-06 | End: 2025-01-06

## 2025-01-06 RX ORDER — PANTOPRAZOLE SODIUM 40 MG/1
40 TABLET, DELAYED RELEASE ORAL
Status: DISCONTINUED | OUTPATIENT
Start: 2025-01-07 | End: 2025-01-13 | Stop reason: HOSPADM

## 2025-01-06 RX ORDER — MULTIVITAMIN WITH IRON
1 TABLET ORAL DAILY
Status: DISCONTINUED | OUTPATIENT
Start: 2025-01-07 | End: 2025-01-13 | Stop reason: HOSPADM

## 2025-01-06 RX ORDER — LISINOPRIL 40 MG/1
40 TABLET ORAL DAILY
Status: DISCONTINUED | OUTPATIENT
Start: 2025-01-07 | End: 2025-01-13 | Stop reason: HOSPADM

## 2025-01-06 RX ORDER — SODIUM CHLORIDE 9 MG/ML
INJECTION, SOLUTION INTRAVENOUS
Status: DISCONTINUED | OUTPATIENT
Start: 2025-01-06 | End: 2025-01-06 | Stop reason: SDUPTHER

## 2025-01-06 RX ORDER — ACETYLCYSTEINE 200 MG/ML
600 SOLUTION ORAL; RESPIRATORY (INHALATION)
Status: DISCONTINUED | OUTPATIENT
Start: 2025-01-06 | End: 2025-01-13 | Stop reason: HOSPADM

## 2025-01-06 RX ORDER — SODIUM CHLORIDE 0.9 % (FLUSH) 0.9 %
5-40 SYRINGE (ML) INJECTION EVERY 12 HOURS SCHEDULED
Status: DISCONTINUED | OUTPATIENT
Start: 2025-01-06 | End: 2025-01-06 | Stop reason: HOSPADM

## 2025-01-06 RX ORDER — SOTALOL HYDROCHLORIDE 80 MG/1
80 TABLET ORAL 2 TIMES DAILY
Status: DISCONTINUED | OUTPATIENT
Start: 2025-01-06 | End: 2025-01-13 | Stop reason: HOSPADM

## 2025-01-06 RX ORDER — ONDANSETRON 4 MG/1
4 TABLET, ORALLY DISINTEGRATING ORAL EVERY 8 HOURS PRN
Status: DISCONTINUED | OUTPATIENT
Start: 2025-01-06 | End: 2025-01-13 | Stop reason: HOSPADM

## 2025-01-06 RX ORDER — SODIUM CHLORIDE 0.9 % (FLUSH) 0.9 %
5-40 SYRINGE (ML) INJECTION EVERY 12 HOURS SCHEDULED
Status: DISCONTINUED | OUTPATIENT
Start: 2025-01-06 | End: 2025-01-13 | Stop reason: HOSPADM

## 2025-01-06 RX ORDER — GABAPENTIN 400 MG/1
400 CAPSULE ORAL 3 TIMES DAILY
COMMUNITY

## 2025-01-06 RX ORDER — ACETAMINOPHEN 325 MG/1
650 TABLET ORAL EVERY 4 HOURS PRN
Status: DISCONTINUED | OUTPATIENT
Start: 2025-01-06 | End: 2025-01-13 | Stop reason: HOSPADM

## 2025-01-06 RX ORDER — SODIUM CHLORIDE FOR INHALATION 0.9 %
3 VIAL, NEBULIZER (ML) INHALATION 3 TIMES DAILY
Status: DISCONTINUED | OUTPATIENT
Start: 2025-01-06 | End: 2025-01-13 | Stop reason: HOSPADM

## 2025-01-06 RX ORDER — ONDANSETRON 2 MG/ML
4 INJECTION INTRAMUSCULAR; INTRAVENOUS EVERY 6 HOURS PRN
Status: DISCONTINUED | OUTPATIENT
Start: 2025-01-06 | End: 2025-01-13 | Stop reason: HOSPADM

## 2025-01-06 RX ORDER — SODIUM CHLORIDE 0.9 % (FLUSH) 0.9 %
5-40 SYRINGE (ML) INJECTION PRN
Status: DISCONTINUED | OUTPATIENT
Start: 2025-01-06 | End: 2025-01-06 | Stop reason: SDUPTHER

## 2025-01-06 RX ORDER — IPRATROPIUM BROMIDE AND ALBUTEROL SULFATE 2.5; .5 MG/3ML; MG/3ML
1 SOLUTION RESPIRATORY (INHALATION) ONCE
Status: COMPLETED | OUTPATIENT
Start: 2025-01-06 | End: 2025-01-06

## 2025-01-06 RX ORDER — ISOSORBIDE MONONITRATE 60 MG/1
60 TABLET, EXTENDED RELEASE ORAL EVERY MORNING
Status: DISCONTINUED | OUTPATIENT
Start: 2025-01-07 | End: 2025-01-13 | Stop reason: HOSPADM

## 2025-01-06 RX ORDER — GABAPENTIN 400 MG/1
400 CAPSULE ORAL 3 TIMES DAILY
Status: DISCONTINUED | OUTPATIENT
Start: 2025-01-06 | End: 2025-01-13 | Stop reason: HOSPADM

## 2025-01-06 RX ORDER — POLYETHYLENE GLYCOL 3350 17 G/17G
17 POWDER, FOR SOLUTION ORAL DAILY PRN
Status: DISCONTINUED | OUTPATIENT
Start: 2025-01-06 | End: 2025-01-13 | Stop reason: HOSPADM

## 2025-01-06 RX ORDER — AMITRIPTYLINE HYDROCHLORIDE 50 MG/1
50 TABLET ORAL NIGHTLY
Status: DISCONTINUED | OUTPATIENT
Start: 2025-01-06 | End: 2025-01-13 | Stop reason: HOSPADM

## 2025-01-06 RX ORDER — SODIUM CHLORIDE 9 MG/ML
INJECTION, SOLUTION INTRAVENOUS PRN
Status: DISCONTINUED | OUTPATIENT
Start: 2025-01-06 | End: 2025-01-13 | Stop reason: HOSPADM

## 2025-01-06 RX ORDER — OMEGA-3/DHA/EPA/FISH OIL 300-1000MG
1000 CAPSULE ORAL 2 TIMES DAILY
Status: DISCONTINUED | OUTPATIENT
Start: 2025-01-06 | End: 2025-01-06

## 2025-01-06 RX ORDER — SODIUM CHLORIDE 9 MG/ML
INJECTION, SOLUTION INTRAVENOUS PRN
Status: DISCONTINUED | OUTPATIENT
Start: 2025-01-06 | End: 2025-01-06 | Stop reason: SDUPTHER

## 2025-01-06 RX ORDER — TERBINAFINE HYDROCHLORIDE 250 MG/1
250 TABLET ORAL DAILY
Status: DISCONTINUED | OUTPATIENT
Start: 2025-01-07 | End: 2025-01-13 | Stop reason: HOSPADM

## 2025-01-06 RX ORDER — SODIUM CHLORIDE 0.9 % (FLUSH) 0.9 %
5-40 SYRINGE (ML) INJECTION PRN
Status: DISCONTINUED | OUTPATIENT
Start: 2025-01-06 | End: 2025-01-06 | Stop reason: HOSPADM

## 2025-01-06 RX ORDER — HYDROCODONE BITARTRATE AND ACETAMINOPHEN 5; 325 MG/1; MG/1
1 TABLET ORAL EVERY 6 HOURS PRN
Status: DISCONTINUED | OUTPATIENT
Start: 2025-01-06 | End: 2025-01-13 | Stop reason: HOSPADM

## 2025-01-06 RX ORDER — HYDROXYZINE HYDROCHLORIDE 10 MG/1
10 TABLET, FILM COATED ORAL NIGHTLY
Status: DISCONTINUED | OUTPATIENT
Start: 2025-01-06 | End: 2025-01-06

## 2025-01-06 RX ORDER — SODIUM CHLORIDE 0.9 % (FLUSH) 0.9 %
5-40 SYRINGE (ML) INJECTION EVERY 12 HOURS SCHEDULED
Status: DISCONTINUED | OUTPATIENT
Start: 2025-01-06 | End: 2025-01-06 | Stop reason: SDUPTHER

## 2025-01-06 RX ORDER — DEXAMETHASONE SODIUM PHOSPHATE 10 MG/ML
10 INJECTION, EMULSION INTRAMUSCULAR; INTRAVENOUS ONCE
Status: COMPLETED | OUTPATIENT
Start: 2025-01-06 | End: 2025-01-06

## 2025-01-06 RX ORDER — AMLODIPINE BESYLATE 5 MG/1
5 TABLET ORAL DAILY
Status: DISCONTINUED | OUTPATIENT
Start: 2025-01-07 | End: 2025-01-13 | Stop reason: HOSPADM

## 2025-01-06 RX ORDER — ROCURONIUM BROMIDE 10 MG/ML
INJECTION, SOLUTION INTRAVENOUS
Status: DISCONTINUED | OUTPATIENT
Start: 2025-01-06 | End: 2025-01-06 | Stop reason: SDUPTHER

## 2025-01-06 RX ADMIN — GABAPENTIN 400 MG: 400 CAPSULE ORAL at 19:44

## 2025-01-06 RX ADMIN — IPRATROPIUM BROMIDE AND ALBUTEROL SULFATE 1 DOSE: .5; 3 SOLUTION RESPIRATORY (INHALATION) at 15:52

## 2025-01-06 RX ADMIN — SODIUM CHLORIDE: 9 INJECTION, SOLUTION INTRAVENOUS at 13:52

## 2025-01-06 RX ADMIN — PROPOFOL 150 MCG/KG/MIN: 10 INJECTION, EMULSION INTRAVENOUS at 14:01

## 2025-01-06 RX ADMIN — SODIUM CHLORIDE: 9 INJECTION, SOLUTION INTRAVENOUS at 11:18

## 2025-01-06 RX ADMIN — Medication 100 MG: at 13:57

## 2025-01-06 RX ADMIN — HYDROMORPHONE HYDROCHLORIDE 0.5 MG: 1 INJECTION, SOLUTION INTRAMUSCULAR; INTRAVENOUS; SUBCUTANEOUS at 21:39

## 2025-01-06 RX ADMIN — WATER 2000 MG: 1 INJECTION INTRAMUSCULAR; INTRAVENOUS; SUBCUTANEOUS at 17:46

## 2025-01-06 RX ADMIN — FENTANYL CITRATE 50 MCG: 50 INJECTION, SOLUTION INTRAMUSCULAR; INTRAVENOUS at 13:57

## 2025-01-06 RX ADMIN — PROPOFOL 150 MG: 10 INJECTION, EMULSION INTRAVENOUS at 13:57

## 2025-01-06 RX ADMIN — ONDANSETRON 4 MG: 2 INJECTION INTRAMUSCULAR; INTRAVENOUS at 13:57

## 2025-01-06 RX ADMIN — DEXAMETHASONE SODIUM PHOSPHATE 10 MG: 10 INJECTION, EMULSION INTRAMUSCULAR; INTRAVENOUS at 11:18

## 2025-01-06 RX ADMIN — FENTANYL CITRATE 50 MCG: 50 INJECTION, SOLUTION INTRAMUSCULAR; INTRAVENOUS at 14:18

## 2025-01-06 RX ADMIN — WATER 2000 MG: 1 INJECTION INTRAMUSCULAR; INTRAVENOUS; SUBCUTANEOUS at 13:59

## 2025-01-06 RX ADMIN — SODIUM CHLORIDE, PRESERVATIVE FREE 10 ML: 5 INJECTION INTRAVENOUS at 19:44

## 2025-01-06 RX ADMIN — AMITRIPTYLINE HYDROCHLORIDE 50 MG: 50 TABLET ORAL at 19:44

## 2025-01-06 RX ADMIN — SOTALOL HYDROCHLORIDE 80 MG: 80 TABLET ORAL at 19:44

## 2025-01-06 RX ADMIN — SUGAMMADEX 200 MG: 100 INJECTION, SOLUTION INTRAVENOUS at 15:02

## 2025-01-06 RX ADMIN — HYDROCODONE BITARTRATE AND ACETAMINOPHEN 1 TABLET: 5; 325 TABLET ORAL at 17:55

## 2025-01-06 RX ADMIN — ROCURONIUM BROMIDE 50 MG: 10 INJECTION INTRAVENOUS at 14:09

## 2025-01-06 ASSESSMENT — PAIN SCALES - GENERAL
PAINLEVEL_OUTOF10: 8
PAINLEVEL_OUTOF10: 7
PAINLEVEL_OUTOF10: 8

## 2025-01-06 ASSESSMENT — PAIN - FUNCTIONAL ASSESSMENT
PAIN_FUNCTIONAL_ASSESSMENT: ACTIVITIES ARE NOT PREVENTED
PAIN_FUNCTIONAL_ASSESSMENT: ACTIVITIES ARE NOT PREVENTED
PAIN_FUNCTIONAL_ASSESSMENT: 0-10

## 2025-01-06 ASSESSMENT — PAIN DESCRIPTION - ORIENTATION
ORIENTATION: MID
ORIENTATION: LOWER

## 2025-01-06 ASSESSMENT — PAIN DESCRIPTION - DESCRIPTORS
DESCRIPTORS: ACHING
DESCRIPTORS: ACHING
DESCRIPTORS: ACHING;DISCOMFORT

## 2025-01-06 ASSESSMENT — PAIN DESCRIPTION - LOCATION
LOCATION: ABDOMEN
LOCATION: THROAT
LOCATION: THROAT

## 2025-01-06 ASSESSMENT — ENCOUNTER SYMPTOMS: SHORTNESS OF BREATH: 1

## 2025-01-06 NOTE — H&P
Update History & Physical    The patient's History and Physical of Juana Quinn CNP my NP in the office was reviewed with the patient and I examined the patient. There was no change. The surgical site was confirmed by the patient and me.       Plan: The risks, benefits, expected outcome, and alternative to the recommended procedure have been discussed with the patient. Patient understands and wants to proceed with the procedure.     Electronically signed by VANNA RICH DO on 1/6/2025 at 3:03 PM        
note to Dr. Alfaro to inform him of our consultation. A new daytime and nighttime pulse oximeter test will be arranged while the patient has his stent in place to verify the respiratory improved we expect.  If the patient is significantly improved as expected, then he will be considered for a laser tracheobronchoplasty using a technique published peer-reviewed literature to perform a controlled sclera finding of the membranous trachea and mainstem bronchi as well as the takeoffs to most of the lung lobes to improve his ability to exhale and clear both his gases and his secretions.     2. Swallowing difficulty.  He reports worsening swallowing difficulties, particularly with liquids, requiring him to take his time and ensure food is extremely soft before swallowing. He had a swallow test at [facility] about a week ago, but the results are not available in the current records. Further evaluation and management will be coordinated with his primary care physician.     3. Fatigue and hypoxemia.  He experiences significant fatigue with minimal exertion, such as walking short distances or performing household tasks. This fatigue is severe enough to cause him to fall asleep during activities. His recent cardiac evaluation in late October to early November showed no issues, and he is no longer on cardiac medications. His fatigue may be related to poor nutrition, as he reports difficulty swallowing and a tendency to eat fast, less nutritious meals. Nutritional counseling and possibly a referral to a dietitian are recommended.  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     Overnight pulse oximetry test showed oxygen saturation between 95 and 97% with a 10% tendency to be between 80 and 89%.  The study appears to have included both his CPAP and supplemental oxygen.  If that is indeed the case, he is on insufficient

## 2025-01-06 NOTE — ANESTHESIA POSTPROCEDURE EVALUATION
Department of Anesthesiology  Postprocedure Note    Patient: Vladimir Muñiz  MRN: [id number]  YOB: 1950  Date of evaluation: 1/6/2025    Procedure Summary       Date: 01/06/25 Room / Location: [address] OR    Anesthesia Start: 1352 Anesthesia Stop: 1522    Procedures:       LARYNGOSCOPY/TRACHEOSCOPY      Bronchoscopy with Tracheal Stent Placement Diagnosis:       Dyspnea on exertion      Tracheobronchomalacia determined by bronchoscopy      (Dyspnea on exertion [R06.09])      (Tracheobronchomalacia determined by bronchoscopy [J39.8])    Surgeons: Jeremy Meyers MD; Gustavo Mcnulty DO Responsible Provider: Javier Daugherty DO    Anesthesia Type: general ASA Status: 3            Anesthesia Type: No value filed.    Koby Phase I: Koby Score: 8    Koby Phase II:      Anesthesia Post Evaluation    Patient location during evaluation: PACU  Patient participation: complete - patient participated  Level of consciousness: awake  Airway patency: patent  Nausea & Vomiting: no nausea  Cardiovascular status: hemodynamically stable  Respiratory status: acceptable  Hydration status: stable  Pain management: adequate    No notable events documented.

## 2025-01-06 NOTE — ANESTHESIA PRE PROCEDURE
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) F33.2   • Dyspnea on exertion R06.09   • Tracheobronchomalacia determined by bronchoscopy J39.8   • Tracheobronchomalacia J39.8       Past Medical History:        Diagnosis Date   • Arrhythmia    • CAD (coronary artery disease)     stents placed   • Dizziness    • Ganglion cyst    • GERD (gastroesophageal reflux disease) 04/15/2014   • Headache 01/15/2014   • Hyperlipidemia    • Hypertension    • Major depression 12/15/2014   • Osteoarthritis    • Restless legs syndrome    • RLS (restless legs syndrome)    • SOB (shortness of breath)        Past Surgical History:        Procedure Laterality Date   • BACK SURGERY     • CHOLECYSTECTOMY, LAPAROSCOPIC     • CHOLECYSTECTOMY, LAPAROSCOPIC     • LUNG REMOVAL, TOTAL N/A    • NECK SURGERY     • TESTICLE SURGERY         Social History:    Social History     Tobacco Use   • Smoking status: Former     Current packs/day: 1.00     Average packs/day: 1 pack/day for 41.0 years (41.0 ttl pk-yrs)     Types: Cigarettes     Start date: 1984     Passive exposure: Past   • Smokeless tobacco: Never   Substance Use Topics   • Alcohol use: No                                Counseling given: Not Answered      Vital Signs (Current):   Vitals:    12/26/24 1409 01/06/25 1030   BP:  137/65   Pulse:  91   Resp:  20   Temp:  97.4 °F (36.3 °C)   TempSrc:  Tympanic   SpO2:  95%   Weight: 81.2 kg (179 lb) 78.6 kg (173 lb 3.2 oz)   Height: 1.753 m (5' 9\") 1.753 m (5' 9\")

## 2025-01-06 NOTE — PROCEDURES
stent was then trimmed to size utilizing an 11 blade scalpel and loaded into the deployment device. Using direct visualization and fluoroscopy guidance the introducer was placed into the trachea and deployed the stent. Following deployment the left leg was into the right side so a grasper was used to grasp onto the stent pull it back allowing the left branch to expand into the left and then the stent was advanced to the raffi.  Following this using bronchoscopy it was seated correctly, however, the right branch was covering the right upper lobe, so utilizing the graspers the stent was removed and further 1.5 cm was trimmed off and the stent placed back into the deployment device and again utilizing direct visualization was deployed.  The stent was again into the right side so graspers used under fluroscopy to adjust and was seated correctly at the raffi.  Following this bronchoscopy was used to visualize and confirm placement, stent in correct place with right upper lobe open and all segments open, mucous was removed and then the scope was removed.  This all was done on unison with Dr. Meyers, who then finished his portion of the case. See his report for further details.  The patient tolerated the procedure well.     The Patient was taken to the Recovery area in satisfactory condition.    Attestation: I performed the procedure with Dr. Meyers.    VANNA RICH,

## 2025-01-07 ENCOUNTER — TELEPHONE (OUTPATIENT)
Dept: UROLOGY | Age: 75
End: 2025-01-07

## 2025-01-07 PROBLEM — R33.9 URINARY RETENTION: Status: ACTIVE | Noted: 2025-01-07

## 2025-01-07 PROCEDURE — 2060000000 HC ICU INTERMEDIATE R&B

## 2025-01-07 PROCEDURE — 6370000000 HC RX 637 (ALT 250 FOR IP): Performed by: NURSE PRACTITIONER

## 2025-01-07 PROCEDURE — 94761 N-INVAS EAR/PLS OXIMETRY MLT: CPT

## 2025-01-07 PROCEDURE — 2700000000 HC OXYGEN THERAPY PER DAY

## 2025-01-07 PROCEDURE — 6360000002 HC RX W HCPCS: Performed by: NURSE PRACTITIONER

## 2025-01-07 PROCEDURE — 99024 POSTOP FOLLOW-UP VISIT: CPT | Performed by: REGISTERED NURSE

## 2025-01-07 PROCEDURE — 6360000002 HC RX W HCPCS: Performed by: REGISTERED NURSE

## 2025-01-07 PROCEDURE — 2500000003 HC RX 250 WO HCPCS: Performed by: NURSE PRACTITIONER

## 2025-01-07 PROCEDURE — 2580000003 HC RX 258: Performed by: NURSE PRACTITIONER

## 2025-01-07 PROCEDURE — 99222 1ST HOSP IP/OBS MODERATE 55: CPT | Performed by: NURSE PRACTITIONER

## 2025-01-07 PROCEDURE — 0B748DZ DILATION OF RIGHT UPPER LOBE BRONCHUS WITH INTRALUMINAL DEVICE, VIA NATURAL OR ARTIFICIAL OPENING ENDOSCOPIC: ICD-10-PCS | Performed by: OTOLARYNGOLOGY

## 2025-01-07 PROCEDURE — 94640 AIRWAY INHALATION TREATMENT: CPT

## 2025-01-07 RX ORDER — TAMSULOSIN HYDROCHLORIDE 0.4 MG/1
0.4 CAPSULE ORAL DAILY
Status: DISCONTINUED | OUTPATIENT
Start: 2025-01-07 | End: 2025-01-13 | Stop reason: HOSPADM

## 2025-01-07 RX ORDER — POLYETHYLENE GLYCOL 3350 17 G/17G
17 POWDER, FOR SOLUTION ORAL DAILY
Status: DISCONTINUED | OUTPATIENT
Start: 2025-01-07 | End: 2025-01-13 | Stop reason: HOSPADM

## 2025-01-07 RX ORDER — ALBUTEROL SULFATE 0.83 MG/ML
2.5 SOLUTION RESPIRATORY (INHALATION) 2 TIMES DAILY
Status: DISCONTINUED | OUTPATIENT
Start: 2025-01-07 | End: 2025-01-13 | Stop reason: HOSPADM

## 2025-01-07 RX ADMIN — ALBUTEROL SULFATE 2.5 MG: 2.5 SOLUTION RESPIRATORY (INHALATION) at 20:27

## 2025-01-07 RX ADMIN — WATER 2000 MG: 1 INJECTION INTRAMUSCULAR; INTRAVENOUS; SUBCUTANEOUS at 17:15

## 2025-01-07 RX ADMIN — SODIUM CHLORIDE, PRESERVATIVE FREE 10 ML: 5 INJECTION INTRAVENOUS at 19:35

## 2025-01-07 RX ADMIN — PRAVASTATIN SODIUM 40 MG: 40 TABLET ORAL at 19:35

## 2025-01-07 RX ADMIN — WATER 2000 MG: 1 INJECTION INTRAMUSCULAR; INTRAVENOUS; SUBCUTANEOUS at 10:33

## 2025-01-07 RX ADMIN — TAMSULOSIN HYDROCHLORIDE 0.4 MG: 0.4 CAPSULE ORAL at 12:17

## 2025-01-07 RX ADMIN — ISODIUM CHLORIDE 3 ML: 0.03 SOLUTION RESPIRATORY (INHALATION) at 14:18

## 2025-01-07 RX ADMIN — POLYETHYLENE GLYCOL 3350 17 G: 17 POWDER, FOR SOLUTION ORAL at 12:17

## 2025-01-07 RX ADMIN — SULFASALAZINE 500 MG: 500 TABLET ORAL at 08:53

## 2025-01-07 RX ADMIN — ISODIUM CHLORIDE 3 ML: 0.03 SOLUTION RESPIRATORY (INHALATION) at 10:27

## 2025-01-07 RX ADMIN — AMLODIPINE BESYLATE 5 MG: 5 TABLET ORAL at 08:53

## 2025-01-07 RX ADMIN — TERBINAFINE 250 MG: 250 TABLET ORAL at 08:55

## 2025-01-07 RX ADMIN — ISODIUM CHLORIDE 3 ML: 0.03 SOLUTION RESPIRATORY (INHALATION) at 20:27

## 2025-01-07 RX ADMIN — ISOSORBIDE MONONITRATE 60 MG: 60 TABLET, EXTENDED RELEASE ORAL at 08:53

## 2025-01-07 RX ADMIN — LISINOPRIL 40 MG: 40 TABLET ORAL at 08:54

## 2025-01-07 RX ADMIN — HYDROCODONE BITARTRATE AND ACETAMINOPHEN 1 TABLET: 5; 325 TABLET ORAL at 07:46

## 2025-01-07 RX ADMIN — GABAPENTIN 400 MG: 400 CAPSULE ORAL at 08:53

## 2025-01-07 RX ADMIN — WATER 2000 MG: 1 INJECTION INTRAMUSCULAR; INTRAVENOUS; SUBCUTANEOUS at 01:35

## 2025-01-07 RX ADMIN — GABAPENTIN 400 MG: 400 CAPSULE ORAL at 14:14

## 2025-01-07 RX ADMIN — SOTALOL HYDROCHLORIDE 80 MG: 80 TABLET ORAL at 19:35

## 2025-01-07 RX ADMIN — PANTOPRAZOLE SODIUM 40 MG: 40 TABLET, DELAYED RELEASE ORAL at 06:50

## 2025-01-07 RX ADMIN — EZETIMIBE 10 MG: 10 TABLET ORAL at 08:53

## 2025-01-07 RX ADMIN — GABAPENTIN 400 MG: 400 CAPSULE ORAL at 19:35

## 2025-01-07 RX ADMIN — SOTALOL HYDROCHLORIDE 80 MG: 80 TABLET ORAL at 08:53

## 2025-01-07 RX ADMIN — AMITRIPTYLINE HYDROCHLORIDE 50 MG: 50 TABLET ORAL at 19:35

## 2025-01-07 RX ADMIN — CALCIUM 500 MG: 500 TABLET ORAL at 08:53

## 2025-01-07 RX ADMIN — ACETYLCYSTEINE 600 MG: 200 SOLUTION ORAL; RESPIRATORY (INHALATION) at 20:26

## 2025-01-07 RX ADMIN — Medication 1 TABLET: at 08:53

## 2025-01-07 RX ADMIN — ALBUTEROL SULFATE 2.5 MG: 2.5 SOLUTION RESPIRATORY (INHALATION) at 10:26

## 2025-01-07 RX ADMIN — ACETYLCYSTEINE 600 MG: 200 SOLUTION ORAL; RESPIRATORY (INHALATION) at 10:26

## 2025-01-07 ASSESSMENT — PAIN SCALES - GENERAL
PAINLEVEL_OUTOF10: 7
PAINLEVEL_OUTOF10: 0
PAINLEVEL_OUTOF10: 0
PAINLEVEL_OUTOF10: 2

## 2025-01-07 ASSESSMENT — PAIN DESCRIPTION - DESCRIPTORS: DESCRIPTORS: ACHING

## 2025-01-07 ASSESSMENT — PAIN - FUNCTIONAL ASSESSMENT: PAIN_FUNCTIONAL_ASSESSMENT: PREVENTS OR INTERFERES SOME ACTIVE ACTIVITIES AND ADLS

## 2025-01-07 ASSESSMENT — PAIN DESCRIPTION - ORIENTATION: ORIENTATION: LOWER

## 2025-01-07 ASSESSMENT — PAIN DESCRIPTION - LOCATION: LOCATION: THROAT

## 2025-01-07 NOTE — CONSULTS
chf    High Blood Pressure Father     Cancer Sister         lymph noids    Cancer Brother         agent orange       ROS:  Constitutional: Negative for chills, fatigue, fever, or weight loss.  Eyes: Denies reported visual changes.  ENT: Denies headache, difficulty swallowing, earache, and nosebleeds.  Cardiovascular: Negative for chest pain, palpitations, tachycardia or edema.  Respiratory: Denies cough or SOB.  GI:The patient denies abdominal or flank pain, anorexia, nausea or vomiting.  : see HPI.  Musculoskeletal: Patient denies low back pain or painful or reduced range of ROM.  Neurological: The patient denies any symptoms of neurological impairment or TIA.  Psychiatric: Denies anxiety or depression.  Skin: Denies rash or lesions.  All remaining ROS negative.    PHYSICAL EXAM:  VITALS:  BP (!) 152/83   Pulse 71   Temp 97.5 °F (36.4 °C) (Oral)   Resp 20   Ht 1.753 m (5' 9\")   Wt 79.4 kg (175 lb 0.7 oz)   SpO2 100%   BMI 25.85 kg/m² .  Nursing note and vitals reviewed.    Constitutional: Alert and oriented times x3, no acute distress, and cooperative to examination with appropriate mood and affect.   HEENT:   Head:         Normocephalic and atraumatic.   Mouth/Throat:          Mucous membranes are normal.   Eyes:         EOM are normal. No scleral icterus.  Nose:    The external appearance of the nose is normal  Ears:     The ears appear normal to external inspection.     Pulmonary/Chest:  Normal respiratory rate and rhthym. No use of accessory muscles.  Abdominal:          Soft. No tenderness.             Musculoskeletal:    Normal range of motion. He exhibits no edema or tenderness of lower extremities.   Extremities:    No cyanosis, clubbing, or edema present.  Neurological:    Alert and oriented.     DATA:  CBC:   Lab Results   Component Value Date/Time    WBC 7.2 12/10/2024 10:21 AM    RBC 4.79 12/10/2024 10:21 AM    HGB 14.3 12/10/2024 10:21 AM    HCT 44.4 12/10/2024 10:21 AM    MCV 92.7 12/10/2024

## 2025-01-07 NOTE — PLAN OF CARE
Problem: Respiratory - Adult  Goal: Achieves optimal ventilation and oxygenation  Outcome: Progressing     Continue HFNC for airway humidification. Patient mutually agreed on goals.

## 2025-01-07 NOTE — OP NOTE
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]    Post-Op Diagnosis: Same       Procedure(s):  LARYNGOSCOPY/TRACHEOSCOPY  Bronchoscopy with Tracheal Stent Placement    Surgeon(s):  Gustavo Mcnulty DO Castellanos, Paul, MD    Assistant:   First Assistant: Raven Puentes, CHACHA - CNP    Anesthesia: General    Estimated Blood Loss (mL): Minimal    Complications: None    Specimens:   * No specimens in log *    Implants:  * No implants in log *      Drains:   Urinary Catheter 01/06/25 Coude (Active)   Catheter Indications Urinary retention (acute or chronic), continuous bladder irrigation or bladder outlet obstruction 01/07/25 0324   Site Assessment Pink 01/07/25 0324   Urine Color Tea 01/07/25 0324   Urine Appearance Clear 01/07/25 0324   Urine Odor Other (Comment) 01/07/25 0324   Collection Container Standard 01/07/25 0324   Securement Method Securing device (Describe) 01/07/25 0324   Catheter Care  Perineal wipes 01/07/25 0324   Catheter Best Practices  Drainage tube clipped to bed;Catheter secured to thigh;Bag below bladder;Bag not on floor;Drainage bag less than half full 01/07/25 0324   Status Draining;Patent 01/07/25 0324   Output (mL) 750 mL 01/07/25 0324       Findings:  Infection Present At Time Of Surgery (PATOS) (choose all levels that have infection present):  No infection present  Other Findings: 1.  EDAC plainly evident under anesthesia by the extent of the collapse during jet ventilation as well as the flapping quality of the inflation affects in the excessive secretions particularly within the right mainstem.  2.  Assisted with the airway securing and adjustment of the stent once placed    Detailed Description of Procedure:   The patient was taken to the operating awake and placed in supine position.

## 2025-01-07 NOTE — PLAN OF CARE
Problem: Discharge Planning  Goal: Discharge to home or other facility with appropriate resources  Outcome: Progressing  Flowsheets  Taken 1/6/2025 2130 by Candelario Moctezuma RN  Discharge to home or other facility with appropriate resources:   Identify barriers to discharge with patient and caregiver   Arrange for needed discharge resources and transportation as appropriate  Taken 1/6/2025 1621 by Ann-Marie Moses RN  Discharge to home or other facility with appropriate resources:   Identify barriers to discharge with patient and caregiver   Identify discharge learning needs (meds, wound care, etc)     Problem: Pain  Goal: Verbalizes/displays adequate comfort level or baseline comfort level  Outcome: Progressing  Flowsheets (Taken 1/6/2025 2130)  Verbalizes/displays adequate comfort level or baseline comfort level:   Encourage patient to monitor pain and request assistance   Assess pain using appropriate pain scale     Problem: Safety - Adult  Goal: Free from fall injury  Outcome: Progressing  Flowsheets (Taken 1/6/2025 2130)  Free From Fall Injury: Instruct family/caregiver on patient safety     Problem: ABCDS Injury Assessment  Goal: Absence of physical injury  Outcome: Progressing  Flowsheets (Taken 1/6/2025 2130)  Absence of Physical Injury: Implement safety measures based on patient assessment

## 2025-01-07 NOTE — CARE COORDINATION
DISCHARGE PLANNING EVALUATION  1/7/25, 2:07 PM EST    Reason for Referral:  Needs HH  Decision Maker: Self  Current Services:  None  New Services Requested: None  Family/ Social/ Home environment: Patient stated that he resides at home with a disabled daughter. His home is one floor with about 2 steps inside.  He uses a cane to ambulate and drives.   Payment Source: [employer] plan  Transportation at Discharge: family  Post-acute (PAC) provider list was provided to patient. Patient was informed of their freedom to choose PAC provider. Discussed and offered to show the patient the relevant PAC Providers quality and resource use measures on Medicare Compare web site via computer based on patient's goals of care and treatment preferences. Questions regarding selection process were answered.      Teach Back Method used with Vladimir regarding care plan and options for home health.  Patient  verbalized understanding of the plan of care and contribute to goal setting.       Patient preferences and discharge plan: Patient declines wanting home health at discharge.  Educated patient on benefits of home health and patient still declined.  Patient provided a home delivered meal list, but he said that will not help him because it will not include his daughter.  Patient encouraged to contact his daughter  about needs for meals for her and to look into home meals for himself.     Electronically signed by BERNICE Driscoll on 1/7/2025 at 2:07 PM

## 2025-01-07 NOTE — CARE COORDINATION
Case Management Assessment Initial Evaluation    Date/Time of Evaluation: 1/7/2025 12:08 PM  Assessment Completed by: Jm Mckinney RN    If patient is discharged prior to next notation, then this note serves as note for discharge by case management.    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]  Tracheobronchomalacia [J39.8]                   Date / Time: 1/6/2025 10:21 AM  Location: [address] [place] [address]     Patient Admission Status: Inpatient   Readmission Risk Low 0-14, Mod 15-19), High > 20: Readmission Risk Score: 5.2    Current PCP: Eric Ramírez MD  Health Care Decision Makers:     Additional Case Management Notes:   From [facility]  Tracheobronchomalacia  PMH: Tracheobronchomalacia  IV AB  Await Therapy Recommendations  Await Cultures  Procedures:   1/6  LARYNGOSCOPY/TRACHEOSCOPY  Bronchoscopy with Tracheal Stent Placement  Patient Goals/Plan/Treatment Preferences: plans home w mentally handicapped/MD (mental capacity 8-10 year old) daughter Mary who goes to [facility] day care 8a-8:30 pm, new [facility] (nursing, PT/OT/ST, aide) still drives, has cane, walker, DANITZA; therapy following, has [employer] HME CPAP, new nebulizer thru [facility]; collaborated w patient and nursing and BELIA [employer] Liaison

## 2025-01-08 LAB — BACTERIA UR CULT: NORMAL

## 2025-01-08 PROCEDURE — 94640 AIRWAY INHALATION TREATMENT: CPT

## 2025-01-08 PROCEDURE — 97530 THERAPEUTIC ACTIVITIES: CPT

## 2025-01-08 PROCEDURE — 6360000002 HC RX W HCPCS: Performed by: REGISTERED NURSE

## 2025-01-08 PROCEDURE — 6360000002 HC RX W HCPCS: Performed by: NURSE PRACTITIONER

## 2025-01-08 PROCEDURE — 2700000000 HC OXYGEN THERAPY PER DAY

## 2025-01-08 PROCEDURE — 97162 PT EVAL MOD COMPLEX 30 MIN: CPT

## 2025-01-08 PROCEDURE — 2500000003 HC RX 250 WO HCPCS: Performed by: NURSE PRACTITIONER

## 2025-01-08 PROCEDURE — 99024 POSTOP FOLLOW-UP VISIT: CPT | Performed by: REGISTERED NURSE

## 2025-01-08 PROCEDURE — 97112 NEUROMUSCULAR REEDUCATION: CPT

## 2025-01-08 PROCEDURE — 97116 GAIT TRAINING THERAPY: CPT

## 2025-01-08 PROCEDURE — 2060000000 HC ICU INTERMEDIATE R&B

## 2025-01-08 PROCEDURE — 6370000000 HC RX 637 (ALT 250 FOR IP): Performed by: NURSE PRACTITIONER

## 2025-01-08 PROCEDURE — 94761 N-INVAS EAR/PLS OXIMETRY MLT: CPT

## 2025-01-08 RX ADMIN — ALBUTEROL SULFATE 2.5 MG: 2.5 SOLUTION RESPIRATORY (INHALATION) at 21:25

## 2025-01-08 RX ADMIN — AMLODIPINE BESYLATE 5 MG: 5 TABLET ORAL at 09:16

## 2025-01-08 RX ADMIN — PANTOPRAZOLE SODIUM 40 MG: 40 TABLET, DELAYED RELEASE ORAL at 05:30

## 2025-01-08 RX ADMIN — LISINOPRIL 40 MG: 40 TABLET ORAL at 09:16

## 2025-01-08 RX ADMIN — ACETYLCYSTEINE 600 MG: 200 SOLUTION ORAL; RESPIRATORY (INHALATION) at 21:29

## 2025-01-08 RX ADMIN — SODIUM CHLORIDE, PRESERVATIVE FREE 10 ML: 5 INJECTION INTRAVENOUS at 20:17

## 2025-01-08 RX ADMIN — SODIUM CHLORIDE, PRESERVATIVE FREE 10 ML: 5 INJECTION INTRAVENOUS at 09:18

## 2025-01-08 RX ADMIN — GABAPENTIN 400 MG: 400 CAPSULE ORAL at 20:16

## 2025-01-08 RX ADMIN — PRAVASTATIN SODIUM 40 MG: 40 TABLET ORAL at 20:16

## 2025-01-08 RX ADMIN — CALCIUM 500 MG: 500 TABLET ORAL at 09:15

## 2025-01-08 RX ADMIN — SOTALOL HYDROCHLORIDE 80 MG: 80 TABLET ORAL at 20:16

## 2025-01-08 RX ADMIN — SOTALOL HYDROCHLORIDE 80 MG: 80 TABLET ORAL at 09:14

## 2025-01-08 RX ADMIN — ISODIUM CHLORIDE 3 ML: 0.03 SOLUTION RESPIRATORY (INHALATION) at 08:12

## 2025-01-08 RX ADMIN — WATER 2000 MG: 1 INJECTION INTRAMUSCULAR; INTRAVENOUS; SUBCUTANEOUS at 03:33

## 2025-01-08 RX ADMIN — TERBINAFINE 250 MG: 250 TABLET ORAL at 09:18

## 2025-01-08 RX ADMIN — ISODIUM CHLORIDE 3 ML: 0.03 SOLUTION RESPIRATORY (INHALATION) at 21:36

## 2025-01-08 RX ADMIN — SULFASALAZINE 500 MG: 500 TABLET ORAL at 09:18

## 2025-01-08 RX ADMIN — EZETIMIBE 10 MG: 10 TABLET ORAL at 09:16

## 2025-01-08 RX ADMIN — TAMSULOSIN HYDROCHLORIDE 0.4 MG: 0.4 CAPSULE ORAL at 09:15

## 2025-01-08 RX ADMIN — GABAPENTIN 400 MG: 400 CAPSULE ORAL at 09:14

## 2025-01-08 RX ADMIN — ACETYLCYSTEINE 600 MG: 200 SOLUTION ORAL; RESPIRATORY (INHALATION) at 08:12

## 2025-01-08 RX ADMIN — WATER 2000 MG: 1 INJECTION INTRAMUSCULAR; INTRAVENOUS; SUBCUTANEOUS at 09:13

## 2025-01-08 RX ADMIN — ALBUTEROL SULFATE 2.5 MG: 2.5 SOLUTION RESPIRATORY (INHALATION) at 08:12

## 2025-01-08 RX ADMIN — Medication 1 TABLET: at 09:15

## 2025-01-08 RX ADMIN — GABAPENTIN 400 MG: 400 CAPSULE ORAL at 14:37

## 2025-01-08 RX ADMIN — ISOSORBIDE MONONITRATE 60 MG: 60 TABLET, EXTENDED RELEASE ORAL at 09:15

## 2025-01-08 RX ADMIN — ISODIUM CHLORIDE 3 ML: 0.03 SOLUTION RESPIRATORY (INHALATION) at 12:48

## 2025-01-08 RX ADMIN — POLYETHYLENE GLYCOL 3350 17 G: 17 POWDER, FOR SOLUTION ORAL at 09:13

## 2025-01-08 RX ADMIN — AMITRIPTYLINE HYDROCHLORIDE 50 MG: 50 TABLET ORAL at 20:16

## 2025-01-08 ASSESSMENT — PAIN SCALES - GENERAL: PAINLEVEL_OUTOF10: 0

## 2025-01-08 NOTE — PLAN OF CARE
Problem: Discharge Planning  Goal: Discharge to home or other facility with appropriate resources  1/7/2025 2040 by Candelario Moctezuma RN  Outcome: Progressing  Flowsheets (Taken 1/7/2025 2040)  Discharge to home or other facility with appropriate resources:   Identify barriers to discharge with patient and caregiver   Arrange for needed discharge resources and transportation as appropriate     Problem: Pain  Goal: Verbalizes/displays adequate comfort level or baseline comfort level  Outcome: Progressing  Flowsheets (Taken 1/7/2025 2040)  Verbalizes/displays adequate comfort level or baseline comfort level:   Encourage patient to monitor pain and request assistance   Assess pain using appropriate pain scale     Problem: Safety - Adult  Goal: Free from fall injury  Outcome: Progressing  Flowsheets (Taken 1/7/2025 2040)  Free From Fall Injury: Instruct family/caregiver on patient safety     Problem: ABCDS Injury Assessment  Goal: Absence of physical injury  Outcome: Progressing  Flowsheets (Taken 1/7/2025 2040)  Absence of Physical Injury: Implement safety measures based on patient assessment     Problem: Respiratory - Adult  Goal: Achieves optimal ventilation and oxygenation  Outcome: Progressing  Flowsheets (Taken 1/7/2025 2040)  Achieves optimal ventilation and oxygenation:   Assess for changes in respiratory status   Assess for changes in mentation and behavior

## 2025-01-08 NOTE — PLAN OF CARE
Problem: Respiratory - Adult    Goal: Achieves optimal ventilation and oxygenation  Achieves optimal ventilation and oxygenation:   Assess for changes in respiratory status   Respiratory therapy support as indicated   Assess for changes in mentation and behavior   Assess and instruct to report shortness of breath or any respiratory difficulty    Outcome: Progressing

## 2025-01-08 NOTE — PLAN OF CARE
Problem: Respiratory - Adult  Goal: Achieves optimal ventilation and oxygenation  1/8/2025 0817 by Angelita Asher RCP  Outcome: Progressing  1/8/2025 0349 by Gilles Ferrell RCP  Outcome: Progressing  Flowsheets (Taken 1/8/2025 0349)  Achieves optimal ventilation and oxygenation:   Assess for changes in respiratory status   Respiratory therapy support as indicated   Assess for changes in mentation and behavior   Assess and instruct to report shortness of breath or any respiratory difficulty  1/7/2025 2040 by Candelario Moctezuma, RN  Outcome: Progressing  Flowsheets (Taken 1/7/2025 2040)  Achieves optimal ventilation and oxygenation:   Assess for changes in respiratory status   Assess for changes in mentation and behavior   Patient lung sounds are considered normal for their current lung condition. No signs of distress noted. Current treatment regimen appropriate

## 2025-01-08 NOTE — PLAN OF CARE
Problem: Discharge Planning  Goal: Discharge to home or other facility with appropriate resources  1/8/2025 1016 by Ann-Marie Moses RN  Outcome: Progressing  Flowsheets (Taken 1/8/2025 0808)  Discharge to home or other facility with appropriate resources:   Identify barriers to discharge with patient and caregiver   Identify discharge learning needs (meds, wound care, etc)  1/7/2025 2040 by Candelario Moctezuma RN  Outcome: Progressing  Flowsheets (Taken 1/7/2025 2040)  Discharge to home or other facility with appropriate resources:   Identify barriers to discharge with patient and caregiver   Arrange for needed discharge resources and transportation as appropriate     Problem: Pain  Goal: Verbalizes/displays adequate comfort level or baseline comfort level  1/8/2025 1016 by Ann-Marie Moses RN  Outcome: Progressing  1/7/2025 2040 by Candelario Moctezuma RN  Outcome: Progressing  Flowsheets (Taken 1/7/2025 2040)  Verbalizes/displays adequate comfort level or baseline comfort level:   Encourage patient to monitor pain and request assistance   Assess pain using appropriate pain scale     Problem: Safety - Adult  Goal: Free from fall injury  1/8/2025 1016 by Ann-Marie Moses RN  Outcome: Progressing  1/7/2025 2040 by Candelario Moctezuma RN  Outcome: Progressing  Flowsheets (Taken 1/7/2025 2040)  Free From Fall Injury: Instruct family/caregiver on patient safety     Problem: ABCDS Injury Assessment  Goal: Absence of physical injury  1/8/2025 1016 by Ann-Marie Moses RN  Outcome: Progressing  1/7/2025 2040 by Candelario Moctezuma RN  Outcome: Progressing  Flowsheets (Taken 1/7/2025 2040)  Absence of Physical Injury: Implement safety measures based on patient assessment     Problem: Respiratory - Adult  Goal: Achieves optimal ventilation and oxygenation  1/8/2025 1016 by Ann-Marie Moses RN  Outcome: Progressing  1/8/2025 0817 by Angelita Asher RCP  Outcome: Progressing  Flowsheets (Taken 1/8/2025 0808 by

## 2025-01-09 LAB
ANION GAP SERPL CALC-SCNC: 10 MEQ/L (ref 8–16)
BASOPHILS ABSOLUTE: 0 THOU/MM3 (ref 0–0.1)
BASOPHILS NFR BLD AUTO: 0.3 %
BUN SERPL-MCNC: 13 MG/DL (ref 7–22)
CALCIUM SERPL-MCNC: 9.2 MG/DL (ref 8.5–10.5)
CHLORIDE SERPL-SCNC: 105 MEQ/L (ref 98–111)
CO2 SERPL-SCNC: 26 MEQ/L (ref 23–33)
CREAT SERPL-MCNC: 1 MG/DL (ref 0.4–1.2)
DEPRECATED RDW RBC AUTO: 42.6 FL (ref 35–45)
EOSINOPHIL NFR BLD AUTO: 1.1 %
EOSINOPHILS ABSOLUTE: 0.1 THOU/MM3 (ref 0–0.4)
ERYTHROCYTE [DISTWIDTH] IN BLOOD BY AUTOMATED COUNT: 12.5 % (ref 11.5–14.5)
GFR SERPL CREATININE-BSD FRML MDRD: 78 ML/MIN/1.73M2
GLUCOSE SERPL-MCNC: 115 MG/DL (ref 70–108)
HCT VFR BLD AUTO: 41.2 % (ref 42–52)
HGB BLD-MCNC: 13.3 GM/DL (ref 14–18)
IMM GRANULOCYTES # BLD AUTO: 0.05 THOU/MM3 (ref 0–0.07)
IMM GRANULOCYTES NFR BLD AUTO: 0.5 %
LYMPHOCYTES ABSOLUTE: 2.1 THOU/MM3 (ref 1–4.8)
LYMPHOCYTES NFR BLD AUTO: 20.9 %
MCH RBC QN AUTO: 30 PG (ref 26–33)
MCHC RBC AUTO-ENTMCNC: 32.3 GM/DL (ref 32.2–35.5)
MCV RBC AUTO: 92.8 FL (ref 80–94)
MONOCYTES ABSOLUTE: 1.2 THOU/MM3 (ref 0.4–1.3)
MONOCYTES NFR BLD AUTO: 11.7 %
NEUTROPHILS ABSOLUTE: 6.7 THOU/MM3 (ref 1.8–7.7)
NEUTROPHILS NFR BLD AUTO: 65.5 %
NRBC BLD AUTO-RTO: 0 /100 WBC
PLATELET # BLD AUTO: 206 THOU/MM3 (ref 130–400)
PMV BLD AUTO: 11.6 FL (ref 9.4–12.4)
POTASSIUM SERPL-SCNC: 4.4 MEQ/L (ref 3.5–5.2)
RBC # BLD AUTO: 4.44 MILL/MM3 (ref 4.7–6.1)
SODIUM SERPL-SCNC: 141 MEQ/L (ref 135–145)
WBC # BLD AUTO: 10.2 THOU/MM3 (ref 4.8–10.8)

## 2025-01-09 PROCEDURE — 6360000002 HC RX W HCPCS: Performed by: REGISTERED NURSE

## 2025-01-09 PROCEDURE — 94640 AIRWAY INHALATION TREATMENT: CPT

## 2025-01-09 PROCEDURE — 6370000000 HC RX 637 (ALT 250 FOR IP): Performed by: NURSE PRACTITIONER

## 2025-01-09 PROCEDURE — 2500000003 HC RX 250 WO HCPCS: Performed by: NURSE PRACTITIONER

## 2025-01-09 PROCEDURE — 2580000003 HC RX 258: Performed by: NURSE PRACTITIONER

## 2025-01-09 PROCEDURE — 99024 POSTOP FOLLOW-UP VISIT: CPT | Performed by: REGISTERED NURSE

## 2025-01-09 PROCEDURE — 94761 N-INVAS EAR/PLS OXIMETRY MLT: CPT

## 2025-01-09 PROCEDURE — 6360000002 HC RX W HCPCS: Performed by: NURSE PRACTITIONER

## 2025-01-09 PROCEDURE — 85025 COMPLETE CBC W/AUTO DIFF WBC: CPT

## 2025-01-09 PROCEDURE — 80048 BASIC METABOLIC PNL TOTAL CA: CPT

## 2025-01-09 PROCEDURE — 1200000003 HC TELEMETRY R&B

## 2025-01-09 PROCEDURE — 36415 COLL VENOUS BLD VENIPUNCTURE: CPT

## 2025-01-09 RX ADMIN — SOTALOL HYDROCHLORIDE 80 MG: 80 TABLET ORAL at 20:01

## 2025-01-09 RX ADMIN — SULFASALAZINE 500 MG: 500 TABLET ORAL at 08:40

## 2025-01-09 RX ADMIN — ISODIUM CHLORIDE 3 ML: 0.03 SOLUTION RESPIRATORY (INHALATION) at 10:03

## 2025-01-09 RX ADMIN — SODIUM CHLORIDE, PRESERVATIVE FREE 10 ML: 5 INJECTION INTRAVENOUS at 08:40

## 2025-01-09 RX ADMIN — ALBUTEROL SULFATE 2.5 MG: 2.5 SOLUTION RESPIRATORY (INHALATION) at 10:03

## 2025-01-09 RX ADMIN — Medication 4 ML: at 15:27

## 2025-01-09 RX ADMIN — GABAPENTIN 400 MG: 400 CAPSULE ORAL at 08:40

## 2025-01-09 RX ADMIN — GABAPENTIN 400 MG: 400 CAPSULE ORAL at 13:27

## 2025-01-09 RX ADMIN — EZETIMIBE 10 MG: 10 TABLET ORAL at 08:39

## 2025-01-09 RX ADMIN — SODIUM CHLORIDE, PRESERVATIVE FREE 10 ML: 5 INJECTION INTRAVENOUS at 20:01

## 2025-01-09 RX ADMIN — Medication 1 TABLET: at 08:39

## 2025-01-09 RX ADMIN — AMLODIPINE BESYLATE 5 MG: 5 TABLET ORAL at 08:40

## 2025-01-09 RX ADMIN — TAMSULOSIN HYDROCHLORIDE 0.4 MG: 0.4 CAPSULE ORAL at 08:39

## 2025-01-09 RX ADMIN — LISINOPRIL 40 MG: 40 TABLET ORAL at 08:40

## 2025-01-09 RX ADMIN — SOTALOL HYDROCHLORIDE 80 MG: 80 TABLET ORAL at 08:39

## 2025-01-09 RX ADMIN — GABAPENTIN 400 MG: 400 CAPSULE ORAL at 20:01

## 2025-01-09 RX ADMIN — PANTOPRAZOLE SODIUM 40 MG: 40 TABLET, DELAYED RELEASE ORAL at 05:40

## 2025-01-09 RX ADMIN — PRAVASTATIN SODIUM 40 MG: 40 TABLET ORAL at 20:01

## 2025-01-09 RX ADMIN — CALCIUM 500 MG: 500 TABLET ORAL at 08:39

## 2025-01-09 RX ADMIN — AMITRIPTYLINE HYDROCHLORIDE 50 MG: 50 TABLET ORAL at 20:01

## 2025-01-09 RX ADMIN — ACETYLCYSTEINE 600 MG: 200 SOLUTION ORAL; RESPIRATORY (INHALATION) at 10:03

## 2025-01-09 RX ADMIN — TERBINAFINE 250 MG: 250 TABLET ORAL at 08:48

## 2025-01-09 RX ADMIN — ISOSORBIDE MONONITRATE 60 MG: 60 TABLET, EXTENDED RELEASE ORAL at 08:40

## 2025-01-09 ASSESSMENT — PAIN SCALES - GENERAL
PAINLEVEL_OUTOF10: 0
PAINLEVEL_OUTOF10: 0

## 2025-01-09 NOTE — PLAN OF CARE
Problem: Respiratory - Adult  Goal: Achieves optimal ventilation and oxygenation  1/8/2025 2141 by Kandice Inman RCP  Outcome: Progressing

## 2025-01-09 NOTE — PLAN OF CARE
Problem: Discharge Planning  Goal: Discharge to home or other facility with appropriate resources  Outcome: Progressing  Flowsheets (Taken 1/9/2025 1155)  Discharge to home or other facility with appropriate resources:   Identify barriers to discharge with patient and caregiver   Identify discharge learning needs (meds, wound care, etc)   Refer to discharge planning if patient needs post-hospital services based on physician order or complex needs related to functional status, cognitive ability or social support system   Arrange for needed discharge resources and transportation as appropriate     Problem: Pain  Goal: Verbalizes/displays adequate comfort level or baseline comfort level  Outcome: Progressing  Flowsheets (Taken 1/9/2025 1155)  Verbalizes/displays adequate comfort level or baseline comfort level:   Encourage patient to monitor pain and request assistance   Administer analgesics based on type and severity of pain and evaluate response   Consider cultural and social influences on pain and pain management   Notify Licensed Independent Practitioner if interventions unsuccessful or patient reports new pain   Assess pain using appropriate pain scale   Implement non-pharmacological measures as appropriate and evaluate response     Problem: Safety - Adult  Goal: Free from fall injury  Outcome: Progressing  Flowsheets (Taken 1/9/2025 1155)  Free From Fall Injury: Instruct family/caregiver on patient safety     Problem: ABCDS Injury Assessment  Goal: Absence of physical injury  Outcome: Progressing  Flowsheets (Taken 1/9/2025 1155)  Absence of Physical Injury: Implement safety measures based on patient assessment     Problem: Respiratory - Adult  Goal: Achieves optimal ventilation and oxygenation  1/9/2025 1155 by Chante Palacios, RN  Outcome: Progressing  Flowsheets (Taken 1/9/2025 1155)  Achieves optimal ventilation and oxygenation:   Assess for changes in respiratory status   Position to facilitate oxygenation

## 2025-01-09 NOTE — CARE COORDINATION
Patient continue to have a frequent productive cough of clear sputum. Patient denies having difficulty swallowing at this time.

## 2025-01-09 NOTE — PLAN OF CARE
Problem: Respiratory - Adult  Goal: Achieves optimal ventilation and oxygenation  1/9/2025 1009 by Shilpa Caldwell RCP  Outcome: Progressing  Note: Patient mutually agreed on goals.    1/8/2025 2141 by Kandice Inman RCP  Outcome: Progressing

## 2025-01-09 NOTE — CARE COORDINATION
1/9/25, 3:21 PM EST    DISCHARGE ON GOING EVALUATION    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]  Tracheobronchomalacia [J39.8]     Barriers to Discharge:   From [facility]  Tracheobronchomalacia  PMH: Tracheobronchomalacia  HFO Room Air/50L  Await Cultures  Client w 7K Transfer: Hand-Off given to LOPEZ Miller CM  Procedures:   1/6  LARYNGOSCOPY/TRACHEOSCOPY  Bronchoscopy with Tracheal Stent Placement  Patient Goals/Plan/Treatment Preferences: plans home w new [place], lives w mentally handicapped/MD (mental capacity 8-10 year old) daughter Mary who goes to [facility] day care 8a-8:30 pm, new [facility] (nursing, PT/OT/ST, aide) still drives, has cane, walker, WC; therapy following, has SR HME CPAP, new nebulizer thru [facility]; ambulates 20 feet w therapy; resistant to SNF, therapy recommends 24h supervision, alone during daytime         PCP: Eric Ramírez MD  Readmission Risk Score: 6.1

## 2025-01-10 ENCOUNTER — APPOINTMENT (OUTPATIENT)
Dept: GENERAL RADIOLOGY | Age: 75
DRG: 202 | End: 2025-01-10
Attending: OTOLARYNGOLOGY
Payer: COMMERCIAL

## 2025-01-10 PROBLEM — E44.0 MODERATE MALNUTRITION (HCC): Chronic | Status: ACTIVE | Noted: 2025-01-10

## 2025-01-10 PROCEDURE — 97166 OT EVAL MOD COMPLEX 45 MIN: CPT

## 2025-01-10 PROCEDURE — 99024 POSTOP FOLLOW-UP VISIT: CPT | Performed by: REGISTERED NURSE

## 2025-01-10 PROCEDURE — 97535 SELF CARE MNGMENT TRAINING: CPT

## 2025-01-10 PROCEDURE — 6370000000 HC RX 637 (ALT 250 FOR IP): Performed by: NURSE PRACTITIONER

## 2025-01-10 PROCEDURE — 71045 X-RAY EXAM CHEST 1 VIEW: CPT

## 2025-01-10 PROCEDURE — 97116 GAIT TRAINING THERAPY: CPT

## 2025-01-10 PROCEDURE — 94640 AIRWAY INHALATION TREATMENT: CPT

## 2025-01-10 PROCEDURE — 6360000002 HC RX W HCPCS: Performed by: NURSE PRACTITIONER

## 2025-01-10 PROCEDURE — 94760 N-INVAS EAR/PLS OXIMETRY 1: CPT

## 2025-01-10 PROCEDURE — 2500000003 HC RX 250 WO HCPCS: Performed by: NURSE PRACTITIONER

## 2025-01-10 PROCEDURE — 97530 THERAPEUTIC ACTIVITIES: CPT

## 2025-01-10 PROCEDURE — 1200000003 HC TELEMETRY R&B

## 2025-01-10 PROCEDURE — 6360000002 HC RX W HCPCS: Performed by: REGISTERED NURSE

## 2025-01-10 RX ORDER — ALBUTEROL SULFATE 0.83 MG/ML
2.5 SOLUTION RESPIRATORY (INHALATION) EVERY 4 HOURS PRN
Status: DISCONTINUED | OUTPATIENT
Start: 2025-01-10 | End: 2025-01-13 | Stop reason: HOSPADM

## 2025-01-10 RX ADMIN — SOTALOL HYDROCHLORIDE 80 MG: 80 TABLET ORAL at 10:44

## 2025-01-10 RX ADMIN — SOTALOL HYDROCHLORIDE 80 MG: 80 TABLET ORAL at 19:45

## 2025-01-10 RX ADMIN — AMITRIPTYLINE HYDROCHLORIDE 50 MG: 50 TABLET ORAL at 19:45

## 2025-01-10 RX ADMIN — EZETIMIBE 10 MG: 10 TABLET ORAL at 10:44

## 2025-01-10 RX ADMIN — ACETYLCYSTEINE 600 MG: 200 SOLUTION ORAL; RESPIRATORY (INHALATION) at 08:55

## 2025-01-10 RX ADMIN — ACETYLCYSTEINE 600 MG: 200 SOLUTION ORAL; RESPIRATORY (INHALATION) at 22:09

## 2025-01-10 RX ADMIN — ISODIUM CHLORIDE 3 ML: 0.03 SOLUTION RESPIRATORY (INHALATION) at 22:01

## 2025-01-10 RX ADMIN — SULFASALAZINE 500 MG: 500 TABLET ORAL at 10:44

## 2025-01-10 RX ADMIN — SODIUM CHLORIDE, PRESERVATIVE FREE 10 ML: 5 INJECTION INTRAVENOUS at 19:46

## 2025-01-10 RX ADMIN — TERBINAFINE 250 MG: 250 TABLET ORAL at 10:44

## 2025-01-10 RX ADMIN — GABAPENTIN 400 MG: 400 CAPSULE ORAL at 19:46

## 2025-01-10 RX ADMIN — TAMSULOSIN HYDROCHLORIDE 0.4 MG: 0.4 CAPSULE ORAL at 10:45

## 2025-01-10 RX ADMIN — ALBUTEROL SULFATE 2.5 MG: 2.5 SOLUTION RESPIRATORY (INHALATION) at 08:55

## 2025-01-10 RX ADMIN — AMLODIPINE BESYLATE 5 MG: 5 TABLET ORAL at 10:45

## 2025-01-10 RX ADMIN — ISOSORBIDE MONONITRATE 60 MG: 60 TABLET, EXTENDED RELEASE ORAL at 10:45

## 2025-01-10 RX ADMIN — Medication 1 TABLET: at 10:44

## 2025-01-10 RX ADMIN — ALBUTEROL SULFATE 2.5 MG: 2.5 SOLUTION RESPIRATORY (INHALATION) at 21:56

## 2025-01-10 RX ADMIN — GABAPENTIN 400 MG: 400 CAPSULE ORAL at 10:45

## 2025-01-10 RX ADMIN — LISINOPRIL 40 MG: 40 TABLET ORAL at 10:45

## 2025-01-10 RX ADMIN — SODIUM CHLORIDE, PRESERVATIVE FREE 10 ML: 5 INJECTION INTRAVENOUS at 10:48

## 2025-01-10 RX ADMIN — PANTOPRAZOLE SODIUM 40 MG: 40 TABLET, DELAYED RELEASE ORAL at 05:39

## 2025-01-10 RX ADMIN — CALCIUM 500 MG: 500 TABLET ORAL at 10:44

## 2025-01-10 RX ADMIN — GABAPENTIN 400 MG: 400 CAPSULE ORAL at 14:59

## 2025-01-10 RX ADMIN — ISODIUM CHLORIDE 3 ML: 0.03 SOLUTION RESPIRATORY (INHALATION) at 08:59

## 2025-01-10 RX ADMIN — PRAVASTATIN SODIUM 40 MG: 40 TABLET ORAL at 19:46

## 2025-01-10 RX ADMIN — ISODIUM CHLORIDE 3 ML: 0.03 SOLUTION RESPIRATORY (INHALATION) at 13:17

## 2025-01-10 RX ADMIN — POLYETHYLENE GLYCOL 3350 17 G: 17 POWDER, FOR SOLUTION ORAL at 10:44

## 2025-01-10 ASSESSMENT — PAIN SCALES - GENERAL
PAINLEVEL_OUTOF10: 0

## 2025-01-10 ASSESSMENT — PAIN SCALES - WONG BAKER
WONGBAKER_NUMERICALRESPONSE: NO HURT
WONGBAKER_NUMERICALRESPONSE: NO HURT

## 2025-01-10 ASSESSMENT — PAIN DESCRIPTION - ORIENTATION: ORIENTATION: LOWER

## 2025-01-10 ASSESSMENT — PAIN DESCRIPTION - LOCATION: LOCATION: THROAT

## 2025-01-10 ASSESSMENT — PAIN DESCRIPTION - DESCRIPTORS: DESCRIPTORS: ACHING

## 2025-01-10 ASSESSMENT — PAIN - FUNCTIONAL ASSESSMENT: PAIN_FUNCTIONAL_ASSESSMENT: PREVENTS OR INTERFERES SOME ACTIVE ACTIVITIES AND ADLS

## 2025-01-10 NOTE — PLAN OF CARE
Problem: Respiratory - Adult  Goal: Clear lung sounds  Outcome: Progressing   Pt continues on aerosols to help clear/thin secretions and clear lung sounds/improve aeration. Patient mutually agreed on goals.

## 2025-01-10 NOTE — CARE COORDINATION
1/10/25, 7:17 AM EST    DISCHARGE BARRIERS        Patient transferred to 7K 24. Report given to unit SW, Yumiko JUNIOR, regarding discharge plan for this patient.

## 2025-01-10 NOTE — CARE COORDINATION
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    PCP: Eric Ramírez MD  Readmission Risk Score: 6.1    Patient Goals/Plan/Treatment Preferences: from home with his daughter Mary. Spoke with pt along with therapy and nurse Nanci. Pt adamant about going home to help care for daughter. Explained the need to care for self and get rehab so you can assist daughter. Pt declined any rehab; still plans to go home. New Russ catheter, CGA with one person and RW, requesting meals delivered. SW following.  [facility]. Pt has RW and quad cane.

## 2025-01-10 NOTE — CARE COORDINATION
Patient up to chair most of day, participated in all therapy. Patient had increased coughing today, yellow color sputum with difficulty swallowing pills. Patient appears depressed lola visited. Patient very anxious to get home to daughter with disabilities. Discharge planning in process, no further concerns

## 2025-01-10 NOTE — PLAN OF CARE
Problem: Discharge Planning  Goal: Discharge to home or other facility with appropriate resources  Outcome: Progressing  Flowsheets (Taken 1/10/2025 1136)  Discharge to home or other facility with appropriate resources:   Identify barriers to discharge with patient and caregiver   Identify discharge learning needs (meds, wound care, etc)   Refer to discharge planning if patient needs post-hospital services based on physician order or complex needs related to functional status, cognitive ability or social support system   Arrange for needed discharge resources and transportation as appropriate     Problem: Pain  Goal: Verbalizes/displays adequate comfort level or baseline comfort level  Outcome: Progressing  Flowsheets (Taken 1/10/2025 1136)  Verbalizes/displays adequate comfort level or baseline comfort level:   Encourage patient to monitor pain and request assistance   Assess pain using appropriate pain scale   Implement non-pharmacological measures as appropriate and evaluate response     Problem: Safety - Adult  Goal: Free from fall injury  Outcome: Progressing  Flowsheets (Taken 1/10/2025 1136)  Free From Fall Injury: Instruct family/caregiver on patient safety     Problem: ABCDS Injury Assessment  Goal: Absence of physical injury  Outcome: Progressing  Flowsheets (Taken 1/10/2025 1136)  Absence of Physical Injury: Implement safety measures based on patient assessment     Problem: Coping  Goal: Pt/Family able to verbalize concerns and demonstrate effective coping strategies  Description: INTERVENTIONS:  1. Assist patient/family to identify coping skills, available support systems and cultural and spiritual values  2. Provide emotional support, including active listening and acknowledgement of concerns of patient and caregivers  3. Reduce environmental stimuli, as able  4. Instruct patient/family in relaxation techniques, as appropriate  5. Assess for spiritual pain/suffering and initiate Spiritual Care,

## 2025-01-10 NOTE — CARE COORDINATION
1/10/25, 8:09 AM EST    DISCHARGE PLANNING EVALUATION    Plans home with leobardo Piper, declines snf

## 2025-01-11 PROCEDURE — 1200000003 HC TELEMETRY R&B

## 2025-01-11 PROCEDURE — 99024 POSTOP FOLLOW-UP VISIT: CPT

## 2025-01-11 PROCEDURE — 94640 AIRWAY INHALATION TREATMENT: CPT

## 2025-01-11 PROCEDURE — 6360000002 HC RX W HCPCS: Performed by: NURSE PRACTITIONER

## 2025-01-11 PROCEDURE — 94761 N-INVAS EAR/PLS OXIMETRY MLT: CPT

## 2025-01-11 PROCEDURE — 6370000000 HC RX 637 (ALT 250 FOR IP): Performed by: NURSE PRACTITIONER

## 2025-01-11 PROCEDURE — 2500000003 HC RX 250 WO HCPCS: Performed by: NURSE PRACTITIONER

## 2025-01-11 PROCEDURE — 2580000003 HC RX 258: Performed by: NURSE PRACTITIONER

## 2025-01-11 PROCEDURE — 6360000002 HC RX W HCPCS: Performed by: REGISTERED NURSE

## 2025-01-11 RX ADMIN — ACETYLCYSTEINE 600 MG: 200 SOLUTION ORAL; RESPIRATORY (INHALATION) at 07:29

## 2025-01-11 RX ADMIN — SODIUM CHLORIDE, PRESERVATIVE FREE 10 ML: 5 INJECTION INTRAVENOUS at 08:54

## 2025-01-11 RX ADMIN — ISOSORBIDE MONONITRATE 60 MG: 60 TABLET, EXTENDED RELEASE ORAL at 08:53

## 2025-01-11 RX ADMIN — Medication 1 TABLET: at 08:53

## 2025-01-11 RX ADMIN — LISINOPRIL 40 MG: 40 TABLET ORAL at 08:53

## 2025-01-11 RX ADMIN — ALBUTEROL SULFATE 2.5 MG: 2.5 SOLUTION RESPIRATORY (INHALATION) at 07:29

## 2025-01-11 RX ADMIN — ISODIUM CHLORIDE 3 ML: 0.03 SOLUTION RESPIRATORY (INHALATION) at 12:04

## 2025-01-11 RX ADMIN — PANTOPRAZOLE SODIUM 40 MG: 40 TABLET, DELAYED RELEASE ORAL at 05:51

## 2025-01-11 RX ADMIN — SOTALOL HYDROCHLORIDE 80 MG: 80 TABLET ORAL at 20:48

## 2025-01-11 RX ADMIN — SODIUM CHLORIDE, PRESERVATIVE FREE 10 ML: 5 INJECTION INTRAVENOUS at 20:48

## 2025-01-11 RX ADMIN — CALCIUM 500 MG: 500 TABLET ORAL at 08:53

## 2025-01-11 RX ADMIN — SOTALOL HYDROCHLORIDE 80 MG: 80 TABLET ORAL at 08:53

## 2025-01-11 RX ADMIN — PRAVASTATIN SODIUM 40 MG: 40 TABLET ORAL at 20:48

## 2025-01-11 RX ADMIN — ISODIUM CHLORIDE 3 ML: 0.03 SOLUTION RESPIRATORY (INHALATION) at 19:40

## 2025-01-11 RX ADMIN — GABAPENTIN 400 MG: 400 CAPSULE ORAL at 14:11

## 2025-01-11 RX ADMIN — SULFASALAZINE 500 MG: 500 TABLET ORAL at 08:53

## 2025-01-11 RX ADMIN — GABAPENTIN 400 MG: 400 CAPSULE ORAL at 20:48

## 2025-01-11 RX ADMIN — ISODIUM CHLORIDE 3 ML: 0.03 SOLUTION RESPIRATORY (INHALATION) at 07:29

## 2025-01-11 RX ADMIN — POLYETHYLENE GLYCOL 3350 17 G: 17 POWDER, FOR SOLUTION ORAL at 18:10

## 2025-01-11 RX ADMIN — AMLODIPINE BESYLATE 5 MG: 5 TABLET ORAL at 08:53

## 2025-01-11 RX ADMIN — TAMSULOSIN HYDROCHLORIDE 0.4 MG: 0.4 CAPSULE ORAL at 08:53

## 2025-01-11 RX ADMIN — TERBINAFINE 250 MG: 250 TABLET ORAL at 09:52

## 2025-01-11 RX ADMIN — ACETYLCYSTEINE 600 MG: 200 SOLUTION ORAL; RESPIRATORY (INHALATION) at 19:40

## 2025-01-11 RX ADMIN — EZETIMIBE 10 MG: 10 TABLET ORAL at 08:53

## 2025-01-11 RX ADMIN — GABAPENTIN 400 MG: 400 CAPSULE ORAL at 08:53

## 2025-01-11 RX ADMIN — ALBUTEROL SULFATE 2.5 MG: 2.5 SOLUTION RESPIRATORY (INHALATION) at 19:40

## 2025-01-11 RX ADMIN — AMITRIPTYLINE HYDROCHLORIDE 50 MG: 50 TABLET ORAL at 20:48

## 2025-01-11 ASSESSMENT — PAIN SCALES - GENERAL
PAINLEVEL_OUTOF10: 0
PAINLEVEL_OUTOF10: 0

## 2025-01-11 NOTE — PLAN OF CARE
Problem: Discharge Planning  Goal: Discharge to home or other facility with appropriate resources  1/11/2025 0021 by Jane Vargas RN  Outcome: Progressing  Flowsheets  Taken 1/11/2025 0021 by Jane Vargas RN  Discharge to home or other facility with appropriate resources:   Identify barriers to discharge with patient and caregiver   Arrange for needed discharge resources and transportation as appropriate   Identify discharge learning needs (meds, wound care, etc)    Problem: Pain  Goal: Verbalizes/displays adequate comfort level or baseline comfort level  1/11/2025 0021 by Jane Vargas RN  Outcome: Progressing  Flowsheets (Taken 1/11/2025 0021)  Verbalizes/displays adequate comfort level or baseline comfort level:   Encourage patient to monitor pain and request assistance   Assess pain using appropriate pain scale   Administer analgesics based on type and severity of pain and evaluate response   Implement non-pharmacological measures as appropriate and evaluate response     Problem: Safety - Adult  Goal: Free from fall injury  1/11/2025 0021 by Jane Vargas RN  Outcome: Progressing  Flowsheets (Taken 1/11/2025 0021)  Free From Fall Injury: Instruct family/caregiver on patient safety     Problem: ABCDS Injury Assessment  Goal: Absence of physical injury  1/11/2025 0021 by Jane Vargas RN  Outcome: Progressing  Flowsheets (Taken 1/11/2025 0021)  Absence of Physical Injury: Implement safety measures based on patient assessment     Problem: Skin/Tissue Integrity  Goal: Absence of new skin breakdown  Description: 1.  Monitor for areas of redness and/or skin breakdown  2.  Assess vascular access sites hourly  3.  Every 4-6 hours minimum:  Change oxygen saturation probe site  4.  Every 4-6 hours:  If on nasal continuous positive airway pressure, respiratory therapy assess nares and determine need for appliance change or resting period.  1/11/2025 0021 by Jane Vargas RN  Outcome:

## 2025-01-11 NOTE — CARE COORDINATION
During hourly rounds, patient noted to be eating a fish sandwich brought in by family. Patient and family educated on dysphagia soft and bite sized diet and aspiration precautions. Patient and family verbalized understanding of teaching.

## 2025-01-11 NOTE — PLAN OF CARE
Problem: Respiratory - Adult  Goal: Clear lung sounds  Outcome: Progressing    Continue tx's to improve breath sounds, increase aeration and decrease WOB.

## 2025-01-11 NOTE — PLAN OF CARE
Problem: Discharge Planning  Goal: Discharge to home or other facility with appropriate resources  1/11/2025 1013 by Tipton, Rylee, LPN  Outcome: Progressing  Flowsheets (Taken 1/11/2025 0928)  Discharge to home or other facility with appropriate resources:   Identify barriers to discharge with patient and caregiver   Identify discharge learning needs (meds, wound care, etc)     Problem: Pain  Goal: Verbalizes/displays adequate comfort level or baseline comfort level  1/11/2025 1013 by Tipton, Rylee, LPN  Outcome: Progressing  Flowsheets (Taken 1/11/2025 0800)  Verbalizes/displays adequate comfort level or baseline comfort level:   Encourage patient to monitor pain and request assistance   Assess pain using appropriate pain scale   Administer analgesics based on type and severity of pain and evaluate response   Implement non-pharmacological measures as appropriate and evaluate response     Problem: Safety - Adult  Goal: Free from fall injury  1/11/2025 1013 by Tipton, Rylee, LPN  Outcome: Progressing  Flowsheets (Taken 1/11/2025 1013)  Free From Fall Injury: Instruct family/caregiver on patient safety     Problem: ABCDS Injury Assessment  Goal: Absence of physical injury  1/11/2025 1013 by Tipton, Rylee, LPN  Outcome: Progressing  Flowsheets (Taken 1/11/2025 1013)  Absence of Physical Injury: Implement safety measures based on patient assessment     Problem: Neurosensory - Adult  Goal: Achieves maximal functionality and self care  1/11/2025 1013 by Tipton, Rylee, LPN  Outcome: Progressing  Flowsheets (Taken 1/11/2025 0928)  Achieves maximal functionality and self care:   Monitor swallowing and airway patency with patient fatigue and changes in neurological status   Encourage and assist patient to increase activity and self care with guidance from physical therapy/occupational therapy     Problem: Skin/Tissue Integrity  Goal: Absence of new skin breakdown  Description: 1.  Monitor for areas of redness and/or skin

## 2025-01-12 PROCEDURE — 94761 N-INVAS EAR/PLS OXIMETRY MLT: CPT

## 2025-01-12 PROCEDURE — 2500000003 HC RX 250 WO HCPCS: Performed by: NURSE PRACTITIONER

## 2025-01-12 PROCEDURE — 6370000000 HC RX 637 (ALT 250 FOR IP): Performed by: NURSE PRACTITIONER

## 2025-01-12 PROCEDURE — 6360000002 HC RX W HCPCS: Performed by: NURSE PRACTITIONER

## 2025-01-12 PROCEDURE — 1200000003 HC TELEMETRY R&B

## 2025-01-12 PROCEDURE — 94640 AIRWAY INHALATION TREATMENT: CPT

## 2025-01-12 PROCEDURE — 99024 POSTOP FOLLOW-UP VISIT: CPT

## 2025-01-12 PROCEDURE — 6360000002 HC RX W HCPCS: Performed by: REGISTERED NURSE

## 2025-01-12 RX ADMIN — AMITRIPTYLINE HYDROCHLORIDE 50 MG: 50 TABLET ORAL at 20:19

## 2025-01-12 RX ADMIN — ALBUTEROL SULFATE 2.5 MG: 2.5 SOLUTION RESPIRATORY (INHALATION) at 16:45

## 2025-01-12 RX ADMIN — TERBINAFINE 250 MG: 250 TABLET ORAL at 09:38

## 2025-01-12 RX ADMIN — AMLODIPINE BESYLATE 5 MG: 5 TABLET ORAL at 09:39

## 2025-01-12 RX ADMIN — SOTALOL HYDROCHLORIDE 80 MG: 80 TABLET ORAL at 09:38

## 2025-01-12 RX ADMIN — SODIUM CHLORIDE, PRESERVATIVE FREE 10 ML: 5 INJECTION INTRAVENOUS at 09:38

## 2025-01-12 RX ADMIN — SULFASALAZINE 500 MG: 500 TABLET ORAL at 09:38

## 2025-01-12 RX ADMIN — PANTOPRAZOLE SODIUM 40 MG: 40 TABLET, DELAYED RELEASE ORAL at 05:25

## 2025-01-12 RX ADMIN — SOTALOL HYDROCHLORIDE 80 MG: 80 TABLET ORAL at 20:21

## 2025-01-12 RX ADMIN — CALCIUM 500 MG: 500 TABLET ORAL at 09:39

## 2025-01-12 RX ADMIN — ACETYLCYSTEINE 600 MG: 200 SOLUTION ORAL; RESPIRATORY (INHALATION) at 16:45

## 2025-01-12 RX ADMIN — ACETYLCYSTEINE 600 MG: 200 SOLUTION ORAL; RESPIRATORY (INHALATION) at 07:24

## 2025-01-12 RX ADMIN — PRAVASTATIN SODIUM 40 MG: 40 TABLET ORAL at 20:20

## 2025-01-12 RX ADMIN — ISOSORBIDE MONONITRATE 60 MG: 60 TABLET, EXTENDED RELEASE ORAL at 09:39

## 2025-01-12 RX ADMIN — GABAPENTIN 400 MG: 400 CAPSULE ORAL at 20:19

## 2025-01-12 RX ADMIN — TAMSULOSIN HYDROCHLORIDE 0.4 MG: 0.4 CAPSULE ORAL at 09:38

## 2025-01-12 RX ADMIN — ALBUTEROL SULFATE 2.5 MG: 2.5 SOLUTION RESPIRATORY (INHALATION) at 07:24

## 2025-01-12 RX ADMIN — ISODIUM CHLORIDE 3 ML: 0.03 SOLUTION RESPIRATORY (INHALATION) at 07:24

## 2025-01-12 RX ADMIN — ISODIUM CHLORIDE 3 ML: 0.03 SOLUTION RESPIRATORY (INHALATION) at 16:45

## 2025-01-12 RX ADMIN — SODIUM CHLORIDE, PRESERVATIVE FREE 10 ML: 5 INJECTION INTRAVENOUS at 20:20

## 2025-01-12 RX ADMIN — GABAPENTIN 400 MG: 400 CAPSULE ORAL at 14:31

## 2025-01-12 RX ADMIN — POLYETHYLENE GLYCOL 3350 17 G: 17 POWDER, FOR SOLUTION ORAL at 09:41

## 2025-01-12 RX ADMIN — ISODIUM CHLORIDE 3 ML: 0.03 SOLUTION RESPIRATORY (INHALATION) at 13:36

## 2025-01-12 RX ADMIN — LISINOPRIL 40 MG: 40 TABLET ORAL at 09:39

## 2025-01-12 RX ADMIN — GABAPENTIN 400 MG: 400 CAPSULE ORAL at 09:39

## 2025-01-12 RX ADMIN — Medication 1 TABLET: at 09:39

## 2025-01-12 RX ADMIN — EZETIMIBE 10 MG: 10 TABLET ORAL at 09:39

## 2025-01-12 ASSESSMENT — PAIN SCALES - GENERAL
PAINLEVEL_OUTOF10: 0
PAINLEVEL_OUTOF10: 0

## 2025-01-12 ASSESSMENT — PAIN SCALES - WONG BAKER
WONGBAKER_NUMERICALRESPONSE: NO HURT
WONGBAKER_NUMERICALRESPONSE: NO HURT

## 2025-01-12 NOTE — RT PROTOCOL NOTE
RT Inhaler-Nebulizer Bronchodilator Protocol Note    There is a bronchodilator order in the chart from a provider indicating to follow the RT Bronchodilator Protocol and there is an “Initiate RT Inhaler-Nebulizer Bronchodilator Protocol” order as well (see protocol at bottom of note).    CXR Findings:  XR CHEST PORTABLE    Result Date: 1/10/2025  Normal mobile chest. **This report has been created using voice recognition software.  It may contain minor errors which are inherent in voice recognition technology.** Electronically signed by Dr. Jm Richards      The findings from the last RT Protocol Assessment were as follows:   History Pulmonary Disease: Smoker 15 pack years or more  Respiratory Pattern: Dyspnea on exertion or RR 21-25 bpm  Breath Sounds: Slightly diminished and/or crackles  Cough: Strong, spontaneous, non-productive  Indication for Bronchodilator Therapy: Decreased or absent breath sounds  Bronchodilator Assessment Score: 5    Aerosolized bronchodilator medication orders have been revised according to the RT Inhaler-Nebulizer Bronchodilator Protocol below.    Respiratory Therapist to perform RT Therapy Protocol Assessment initially then follow the protocol.  Repeat RT Therapy Protocol Assessment PRN for score 0-3 or on second treatment, BID, and PRN for scores above 3.    No Indications - adjust the frequency to every 6 hours PRN wheezing or bronchospasm, if no treatments needed after 48 hours then discontinue using Per Protocol order mode.     If indication present, adjust the RT bronchodilator orders based on the Bronchodilator Assessment Score as indicated below.  Use Inhaler orders unless patient has one or more of the following: on home nebulizer, not able to hold breath for 10 seconds, is not alert and oriented, cannot activate and use MDI correctly, or respiratory rate 25 breaths per minute or more, then use the equivalent nebulizer order(s) with same Frequency and PRN reasons based on the 
RT Inhaler-Nebulizer Bronchodilator Protocol Note    There is a bronchodilator order in the chart from a provider indicating to follow the RT Bronchodilator Protocol and there is an “Initiate RT Inhaler-Nebulizer Bronchodilator Protocol” order as well (see protocol at bottom of note).    CXR Findings:  XR CHEST PORTABLE    Result Date: 1/10/2025  Normal mobile chest. **This report has been created using voice recognition software.  It may contain minor errors which are inherent in voice recognition technology.** Electronically signed by Dr. Jm Richards      The findings from the last RT Protocol Assessment were as follows:   History Pulmonary Disease: Smoker 15 pack years or more  Respiratory Pattern: Dyspnea on exertion or RR 21-25 bpm  Breath Sounds: Slightly diminished and/or crackles  Cough: Strong, spontaneous, non-productive  Indication for Bronchodilator Therapy: Decreased or absent breath sounds  Bronchodilator Assessment Score: 5    Aerosolized bronchodilator medication orders have been revised according to the RT Inhaler-Nebulizer Bronchodilator Protocol below.    Respiratory Therapist to perform RT Therapy Protocol Assessment initially then follow the protocol.  Repeat RT Therapy Protocol Assessment PRN for score 0-3 or on second treatment, BID, and PRN for scores above 3.    No Indications - adjust the frequency to every 6 hours PRN wheezing or bronchospasm, if no treatments needed after 48 hours then discontinue using Per Protocol order mode.     If indication present, adjust the RT bronchodilator orders based on the Bronchodilator Assessment Score as indicated below.  Use Inhaler orders unless patient has one or more of the following: on home nebulizer, not able to hold breath for 10 seconds, is not alert and oriented, cannot activate and use MDI correctly, or respiratory rate 25 breaths per minute or more, then use the equivalent nebulizer order(s) with same Frequency and PRN reasons based on the 
RT Inhaler-Nebulizer Bronchodilator Protocol Note    There is a bronchodilator order in the chart from a provider indicating to follow the RT Bronchodilator Protocol and there is an “Initiate RT Inhaler-Nebulizer Bronchodilator Protocol” order as well (see protocol at bottom of note).    CXR Findings:  XR CHEST PORTABLE    Result Date: 1/10/2025  Normal mobile chest. **This report has been created using voice recognition software.  It may contain minor errors which are inherent in voice recognition technology.** Electronically signed by Dr. Jm Richards      The findings from the last RT Protocol Assessment were as follows:   History Pulmonary Disease: Smoker 15 pack years or more  Respiratory Pattern: Regular pattern and RR 12-20 bpm  Breath Sounds: Clear breath sounds  Cough: Strong, spontaneous, non-productive  Indication for Bronchodilator Therapy: Decreased or absent breath sounds  Bronchodilator Assessment Score: 1    Aerosolized bronchodilator medication orders have been revised according to the RT Inhaler-Nebulizer Bronchodilator Protocol below.    Respiratory Therapist to perform RT Therapy Protocol Assessment initially then follow the protocol.  Repeat RT Therapy Protocol Assessment PRN for score 0-3 or on second treatment, BID, and PRN for scores above 3.    No Indications - adjust the frequency to every 6 hours PRN wheezing or bronchospasm, if no treatments needed after 48 hours then discontinue using Per Protocol order mode.     If indication present, adjust the RT bronchodilator orders based on the Bronchodilator Assessment Score as indicated below.  Use Inhaler orders unless patient has one or more of the following: on home nebulizer, not able to hold breath for 10 seconds, is not alert and oriented, cannot activate and use MDI correctly, or respiratory rate 25 breaths per minute or more, then use the equivalent nebulizer order(s) with same Frequency and PRN reasons based on the score.  If a patient 
RT Inhaler-Nebulizer Bronchodilator Protocol Note    There is a bronchodilator order in the chart from a provider indicating to follow the RT Bronchodilator Protocol and there is an “Initiate RT Inhaler-Nebulizer Bronchodilator Protocol” order as well (see protocol at bottom of note).    CXR Findings:  XR CHEST PORTABLE    Result Date: 1/10/2025  Normal mobile chest. **This report has been created using voice recognition software.  It may contain minor errors which are inherent in voice recognition technology.** Electronically signed by Dr. Jm Richards      The findings from the last RT Protocol Assessment were as follows:   History Pulmonary Disease: Smoker 15 pack years or more  Respiratory Pattern: Regular pattern and RR 12-20 bpm  Breath Sounds: Clear breath sounds  Cough: Strong, spontaneous, non-productive  Indication for Bronchodilator Therapy: Decreased or absent breath sounds  Bronchodilator Assessment Score: 1    Aerosolized bronchodilator medication orders have been revised according to the RT Inhaler-Nebulizer Bronchodilator Protocol below.    Respiratory Therapist to perform RT Therapy Protocol Assessment initially then follow the protocol.  Repeat RT Therapy Protocol Assessment PRN for score 0-3 or on second treatment, BID, and PRN for scores above 3.    No Indications - adjust the frequency to every 6 hours PRN wheezing or bronchospasm, if no treatments needed after 48 hours then discontinue using Per Protocol order mode.     If indication present, adjust the RT bronchodilator orders based on the Bronchodilator Assessment Score as indicated below.  Use Inhaler orders unless patient has one or more of the following: on home nebulizer, not able to hold breath for 10 seconds, is not alert and oriented, cannot activate and use MDI correctly, or respiratory rate 25 breaths per minute or more, then use the equivalent nebulizer order(s) with same Frequency and PRN reasons based on the score.  If a patient 
order with TID Frequency and one order with Frequency of every 4 hours PRN wheezing or increased work of breathing using Per Protocol order mode.       11-13 - enter or revise RT Bronchodilator order(s) to one equivalent RT bronchodilator order with QID Frequency and an Albuterol order with Frequency of every 4 hours PRN wheezing or increased work of breathing using Per Protocol order mode.      Greater than 13 - enter or revise RT Bronchodilator order(s) to one equivalent RT bronchodilator order with every 4 hours Frequency and an Albuterol order with Frequency of every 2 hours PRN wheezing or increased work of breathing using Per Protocol order mode.     RT to enter RT Home Evaluation for COPD & MDI Assessment order using Per Protocol order mode.    Electronically signed by Kandice Inman RCP on 1/8/2025 at 9:41 PM

## 2025-01-12 NOTE — PLAN OF CARE
Problem: Discharge Planning  Goal: Discharge to home or other facility with appropriate resources  Outcome: Progressing      Problem: Pain  Goal: Verbalizes/displays adequate comfort level or baseline comfort level  Outcome: Progressing      Problem: Safety - Adult  Goal: Free from fall injury  Outcome: Progressing      Problem: ABCDS Injury Assessment  Goal: Absence of physical injury  Outcome: Progressing  Flowsheets (Taken 1/12/2025 1101)  Absence of Physical Injury: Implement safety measures based on patient assessment     Problem: Respiratory - Adult  Goal: Clear lung sounds  1Outcome: Progressing     Problem: Skin/Tissue Integrity  Goal: Absence of new skin breakdown  Description: 1.  Monitor for areas of redness and/or skin breakdown  2.  Assess vascular access sites hourly  3.  Every 4-6 hours minimum:  Change oxygen saturation probe site  4.  Every 4-6 hours:  If on nasal continuous positive airway pressure, respiratory therapy assess nares and determine need for appliance change or resting period.  Outcome: Progressing     Problem: Neurosensory - Adult  Goal: Achieves maximal functionality and self care  Outcome: Progressing     Problem: Coping  Goal: Pt/Family able to verbalize concerns and demonstrate effective coping strategies  Description: INTERVENTIONS:  1. Assist patient/family to identify coping skills, available support systems and cultural and spiritual values  2. Provide emotional support, including active listening and acknowledgement of concerns of patient and caregivers  3. Reduce environmental stimuli, as able  4. Instruct patient/family in relaxation techniques, as appropriate  5. Assess for spiritual pain/suffering and initiate Spiritual Care, Psychosocial Clinical Specialist consults as needed  Outcome: Progressing     Problem: Nutrition Deficit:  Goal: Optimize nutritional status  Outcome: Progressing

## 2025-01-12 NOTE — PLAN OF CARE
Problem: Respiratory - Adult  Goal: Clear lung sounds  Outcome: Progressing   Continue tx's to improve breath sounds, increase aeration and decrease WOB. Pat mutually agrees with goals.

## 2025-01-12 NOTE — PLAN OF CARE
Problem: Respiratory - Adult  Goal: Clear lung sounds  1/12/2025 0731 by Yvonne Campa, RCMELISSA  Outcome: Progressing    Continue tx's to improve breath sounds, increase aeration and decrease WOB.

## 2025-01-13 ENCOUNTER — TELEPHONE (OUTPATIENT)
Dept: ADMINISTRATIVE | Age: 75
End: 2025-01-13

## 2025-01-13 VITALS
RESPIRATION RATE: 18 BRPM | SYSTOLIC BLOOD PRESSURE: 143 MMHG | DIASTOLIC BLOOD PRESSURE: 71 MMHG | TEMPERATURE: 98.3 F | BODY MASS INDEX: 25.93 KG/M2 | OXYGEN SATURATION: 94 % | WEIGHT: 175.04 LBS | HEART RATE: 64 BPM | HEIGHT: 69 IN

## 2025-01-13 PROCEDURE — 6370000000 HC RX 637 (ALT 250 FOR IP): Performed by: NURSE PRACTITIONER

## 2025-01-13 PROCEDURE — 97116 GAIT TRAINING THERAPY: CPT

## 2025-01-13 PROCEDURE — 97530 THERAPEUTIC ACTIVITIES: CPT

## 2025-01-13 PROCEDURE — 2500000003 HC RX 250 WO HCPCS: Performed by: NURSE PRACTITIONER

## 2025-01-13 PROCEDURE — 6360000002 HC RX W HCPCS: Performed by: REGISTERED NURSE

## 2025-01-13 PROCEDURE — 99024 POSTOP FOLLOW-UP VISIT: CPT | Performed by: REGISTERED NURSE

## 2025-01-13 PROCEDURE — 94640 AIRWAY INHALATION TREATMENT: CPT

## 2025-01-13 PROCEDURE — 97112 NEUROMUSCULAR REEDUCATION: CPT

## 2025-01-13 PROCEDURE — 94761 N-INVAS EAR/PLS OXIMETRY MLT: CPT

## 2025-01-13 PROCEDURE — 97110 THERAPEUTIC EXERCISES: CPT

## 2025-01-13 PROCEDURE — 51798 US URINE CAPACITY MEASURE: CPT

## 2025-01-13 PROCEDURE — 6360000002 HC RX W HCPCS: Performed by: NURSE PRACTITIONER

## 2025-01-13 RX ORDER — ACETYLCYSTEINE 200 MG/ML
600 SOLUTION ORAL; RESPIRATORY (INHALATION)
Qty: 180 ML | Refills: 0 | Status: SHIPPED | OUTPATIENT
Start: 2025-01-13 | End: 2025-02-12

## 2025-01-13 RX ORDER — SODIUM CHLORIDE FOR INHALATION 0.9 %
3 VIAL, NEBULIZER (ML) INHALATION 3 TIMES DAILY
Qty: 270 ML | Refills: 1 | Status: SHIPPED | OUTPATIENT
Start: 2025-01-13 | End: 2025-02-12

## 2025-01-13 RX ORDER — SODIUM CHLORIDE FOR INHALATION 3 %
4 VIAL, NEBULIZER (ML) INHALATION PRN
Qty: 270 ML | Refills: 1 | Status: SHIPPED | OUTPATIENT
Start: 2025-01-13

## 2025-01-13 RX ORDER — TAMSULOSIN HYDROCHLORIDE 0.4 MG/1
0.4 CAPSULE ORAL DAILY
Qty: 30 CAPSULE | Refills: 3 | Status: SHIPPED | OUTPATIENT
Start: 2025-01-13

## 2025-01-13 RX ORDER — ALBUTEROL SULFATE 0.83 MG/ML
2.5 SOLUTION RESPIRATORY (INHALATION) 2 TIMES DAILY
Qty: 120 EACH | Refills: 3 | Status: SHIPPED | OUTPATIENT
Start: 2025-01-13

## 2025-01-13 RX ADMIN — PANTOPRAZOLE SODIUM 40 MG: 40 TABLET, DELAYED RELEASE ORAL at 05:00

## 2025-01-13 RX ADMIN — GABAPENTIN 400 MG: 400 CAPSULE ORAL at 08:24

## 2025-01-13 RX ADMIN — Medication 1 TABLET: at 08:24

## 2025-01-13 RX ADMIN — SULFASALAZINE 500 MG: 500 TABLET ORAL at 10:50

## 2025-01-13 RX ADMIN — ISODIUM CHLORIDE 3 ML: 0.03 SOLUTION RESPIRATORY (INHALATION) at 12:37

## 2025-01-13 RX ADMIN — AMLODIPINE BESYLATE 5 MG: 5 TABLET ORAL at 08:25

## 2025-01-13 RX ADMIN — ALBUTEROL SULFATE 2.5 MG: 2.5 SOLUTION RESPIRATORY (INHALATION) at 08:19

## 2025-01-13 RX ADMIN — TERBINAFINE 250 MG: 250 TABLET ORAL at 08:26

## 2025-01-13 RX ADMIN — CALCIUM 500 MG: 500 TABLET ORAL at 08:24

## 2025-01-13 RX ADMIN — TAMSULOSIN HYDROCHLORIDE 0.4 MG: 0.4 CAPSULE ORAL at 08:24

## 2025-01-13 RX ADMIN — LISINOPRIL 40 MG: 40 TABLET ORAL at 08:24

## 2025-01-13 RX ADMIN — POLYETHYLENE GLYCOL 3350 17 G: 17 POWDER, FOR SOLUTION ORAL at 08:24

## 2025-01-13 RX ADMIN — EZETIMIBE 10 MG: 10 TABLET ORAL at 08:24

## 2025-01-13 RX ADMIN — ACETYLCYSTEINE 600 MG: 200 SOLUTION ORAL; RESPIRATORY (INHALATION) at 08:19

## 2025-01-13 RX ADMIN — ISODIUM CHLORIDE 3 ML: 0.03 SOLUTION RESPIRATORY (INHALATION) at 08:19

## 2025-01-13 RX ADMIN — SOTALOL HYDROCHLORIDE 80 MG: 80 TABLET ORAL at 08:24

## 2025-01-13 RX ADMIN — GABAPENTIN 400 MG: 400 CAPSULE ORAL at 14:01

## 2025-01-13 RX ADMIN — SODIUM CHLORIDE, PRESERVATIVE FREE 10 ML: 5 INJECTION INTRAVENOUS at 08:24

## 2025-01-13 RX ADMIN — ISOSORBIDE MONONITRATE 60 MG: 60 TABLET, EXTENDED RELEASE ORAL at 08:24

## 2025-01-13 ASSESSMENT — PAIN SCALES - GENERAL: PAINLEVEL_OUTOF10: 0

## 2025-01-13 ASSESSMENT — PAIN SCALES - WONG BAKER: WONGBAKER_NUMERICALRESPONSE: NO HURT

## 2025-01-13 NOTE — PLAN OF CARE
Problem: Respiratory - Adult  Goal: Clear lung sounds  Outcome: Progressing    Continue tx's to improve breath sounds, increase aeration and decrease WOB. Pt mutually agrees with goals.

## 2025-01-13 NOTE — TELEPHONE ENCOUNTER
[facility] called to schedule Follow up with Belkys Forbes APRN - CNP (Urology) in 1 week (1/20/2025); post hospital void trial, for post hospital follow-up. Please return call to patient to schedule

## 2025-01-13 NOTE — CARE COORDINATION
1/13/25, 1:49 PM EST    Patient goals/plan/ treatment preferences discussed by  and .  Patient goals/plan/ treatment preferences reviewed with patient/ family.  Patient/ family verbalize understanding of discharge plan and are in agreement with goal/plan/treatment preferences.  Understanding was demonstrated using the teach back method.  AVS provided by RN at time of discharge, which includes all necessary medical information pertaining to the patients current course of illness, treatment, post-discharge goals of care, and treatment preferences.     Services At/After Discharge: Home Health, Nursing service, OT, PT, and SLP and .       Explained to pt this am when [facility] delivered needs to go home with ortega catheter.     1255- left message with SR ASIM Huerta to inform them of situation. No call back.    Son here this afternoon; yelling at this CM stating \"my Dad wants the catheter out before he is discharged.\" Shared attending wants pt to keep ortega in and follow up at urology office with void trial as he has urinary retention. Both pt and son adamant about removing ortega. Discussed if he cannot void after he gets home; it will be his responsibility as MD recommends against removal. Son agrees to keep pt here, remove ortega, and wait until he voids before discharge.      1:53 PM  Notified SR ASIM Sam RN; updated on ortega catheter situation and patient request remove ortega.

## 2025-01-13 NOTE — PLAN OF CARE
Problem: Discharge Planning  Goal: Discharge to home or other facility with appropriate resources  Outcome: Progressing  Flowsheets (Taken 1/12/2025 1700 by Debra Fontana, RN)  Discharge to home or other facility with appropriate resources:   Identify barriers to discharge with patient and caregiver   Arrange for needed discharge resources and transportation as appropriate   Identify discharge learning needs (meds, wound care, etc)   Refer to discharge planning if patient needs post-hospital services based on physician order or complex needs related to functional status, cognitive ability or social support system     Problem: Pain  Goal: Verbalizes/displays adequate comfort level or baseline comfort level  Outcome: Progressing     Problem: Safety - Adult  Goal: Free from fall injury  Outcome: Progressing     Problem: ABCDS Injury Assessment  Goal: Absence of physical injury  Outcome: Progressing     Problem: Respiratory - Adult  Goal: Clear lung sounds  1/12/2025 1650 by Yvonne Campa RCP  Outcome: Progressing     Problem: Skin/Tissue Integrity  Goal: Absence of new skin breakdown  Description: 1.  Monitor for areas of redness and/or skin breakdown  2.  Assess vascular access sites hourly  3.  Every 4-6 hours minimum:  Change oxygen saturation probe site  4.  Every 4-6 hours:  If on nasal continuous positive airway pressure, respiratory therapy assess nares and determine need for appliance change or resting period.  Outcome: Progressing     Problem: Neurosensory - Adult  Goal: Achieves maximal functionality and self care  Outcome: Progressing  Flowsheets (Taken 1/12/2025 1700 by Debra Fontana, RN)  Achieves maximal functionality and self care: Monitor swallowing and airway patency with patient fatigue and changes in neurological status   Care plan reviewed with patient.  Patient verbalize understanding of the plan of care and contribute to goal setting.

## 2025-01-13 NOTE — CARE COORDINATION
1/13/25, 7:52 AM EST    DISCHARGE PLANNING EVALUATION    Patient plans home with support from family, cares for daughter at home.  [facility] has accepted and plans to follow at discharge.

## 2025-01-13 NOTE — PLAN OF CARE
Problem: Discharge Planning  Goal: Discharge to home or other facility with appropriate resources  1/13/2025 1548 by Pavithra Hung RN  Outcome: Completed  1/13/2025 1131 by Nanci Richards LPN  Outcome: Progressing  Flowsheets (Taken 1/13/2025 1131)  Discharge to home or other facility with appropriate resources:   Identify discharge learning needs (meds, wound care, etc)   Identify barriers to discharge with patient and caregiver   Refer to discharge planning if patient needs post-hospital services based on physician order or complex needs related to functional status, cognitive ability or social support system   Arrange for needed discharge resources and transportation as appropriate  1/13/2025 0627 by Tank Ocasio, RN  Outcome: Progressing  Flowsheets (Taken 1/12/2025 1700 by Debra Fontana RN)  Discharge to home or other facility with appropriate resources:   Identify barriers to discharge with patient and caregiver   Arrange for needed discharge resources and transportation as appropriate   Identify discharge learning needs (meds, wound care, etc)   Refer to discharge planning if patient needs post-hospital services based on physician order or complex needs related to functional status, cognitive ability or social support system     Problem: Pain  Goal: Verbalizes/displays adequate comfort level or baseline comfort level  1/13/2025 1548 by Pavithra Hung RN  Outcome: Completed  1/13/2025 1131 by Nanci Richards LPN  Outcome: Progressing  Flowsheets (Taken 1/11/2025 0800 by Tipton, Rylee, LPN)  Verbalizes/displays adequate comfort level or baseline comfort level:   Encourage patient to monitor pain and request assistance   Assess pain using appropriate pain scale   Administer analgesics based on type and severity of pain and evaluate response   Implement non-pharmacological measures as appropriate and evaluate response  1/13/2025 0627 by Tank Ocasio, RN  Outcome: Progressing     Problem: Safety -  HPI     Pt here for eye lesion.  Lesion present for about 1-2 years, noticed more   a couple weeks ago, possibly growing, also had lid swelling at that time.    No pain, itching or burning or bleeding.  Has had trouble keeping weight   on recently, however has also had multiple recent procedures.  No history   skin cancer.  No fevers, chills, sweats.   Referred by: Dr. Ana Bennett at [facility].  -----------------------------------------------------------  SYMPTOMS:  Lesion has been present for ~1-2 years.  Started to bother pt ~4 wks ago.  -----------------------------------------------------------  EYE MEDS:  Ointment: No  Warm Compresses: No  No other eye medications.      Last edited by Rogerio Wells MD on 9/24/2018  1:41 PM. (History)        Assessment /Plan     For exam results, see Encounter Report.    Eyelid lesion  -     External/Slit Lamp Photography  -     Tissue Specimen To Pathology, Ophthalmology    Other orders  -     tobramycin-dexamethasone 0.3-0.1% (TOBRADEX) 0.3-0.1 % Oint; Place into the left eye 3 (three) times daily. Place a 1/2 inch ribbon of ointment onto left lower eyelid for 10 days  Dispense: 3.5 g; Refill: 0    Lesion left lower lid  - Basal cell carcinoma vs epidermal inclusion cyst OS, higher risk given psoriatic arthritis on immunosuppression and drug-induced SLE  - Will send for pathology  - Informed consent obtained after extensive risks/benefits/alternatives were discussed with the patient including but not limited to pain, bleeding, infection, loss of vision, loss of the eye, asymmetry, need for revision in future, scarring.  Alternatives such as observation discussed.  All questions were answered.    - Start Tobradex TID to excision site    RTC oculoplastics prn pending pathology results      PROCEDURE NOTE:  Procedure: Excision and biopsy of left lower eyelid lesion  Indication: Left lower eyelid lesion, rule out malignancy  Attending: Ileana Chavira MD  Resident: Rogerio VASQUEZ  MD Russell  Procedure details:   Risks, benefits, and alternatives of lesion excision and biopsy were discussed.  The patient voiced understanding and wished to proceed.  Informed consent was obtained.     The area was anesthetized using subcutaneous 2% lidocaine with 1:100,000 epinephrine, 0.5% Marcaine, Vitrase, and sodium bicarbonate.  Proparacaine was instilled in the left eye.  The site was cleaned with alcohol wipe.  The surgical site was draped.  A corneal shield was placed in the left palpebral fissure.  The lesion was grasped with Sanches forceps, unroofed with Ondina scissors and then excised at its base.  The specimen was placed in formalin and sent for pathologic evaluation. The area was cauterized using high-temp electrocautery.  Hemostasis was attained.  The corneal shield was removed. Tobradex ointment was applied to the area. The patient tolerated the procedure well without complications.         Post-operative instructions were given to the patient.     I have reviewed and concur with the resident's history, physical, assessment, and plan.  I have personally interviewed and examined the patient.

## 2025-01-13 NOTE — PLAN OF CARE
Problem: Discharge Planning  Goal: Discharge to home or other facility with appropriate resources  1/13/2025 1131 by Nanci Richards LPN  Outcome: Progressing  Flowsheets (Taken 1/13/2025 1131)  Discharge to home or other facility with appropriate resources:   Identify discharge learning needs (meds, wound care, etc)   Identify barriers to discharge with patient and caregiver   Refer to discharge planning if patient needs post-hospital services based on physician order or complex needs related to functional status, cognitive ability or social support system   Arrange for needed discharge resources and transportation as appropriate     Problem: Pain  Goal: Verbalizes/displays adequate comfort level or baseline comfort level  1/13/2025 1131 by Nanci Richards LPN  Outcome: Progressing  Flowsheets (Taken 1/11/2025 0800 by Tipton, Rylee, LPN)  Verbalizes/displays adequate comfort level or baseline comfort level:   Encourage patient to monitor pain and request assistance   Assess pain using appropriate pain scale   Administer analgesics based on type and severity of pain and evaluate response   Implement non-pharmacological measures as appropriate and evaluate response  1/13/2025 0627 by Tank Ocasio RN  Outcome: Progressing     Problem: Safety - Adult  Goal: Free from fall injury  1/13/2025 1131 by Nanci Richards LPN  Outcome: Progressing  Flowsheets (Taken 1/11/2025 1600 by Debra Fontana RN)  Free From Fall Injury: Instruct family/caregiver on patient safety  1/13/2025 0627 by Tank Ocasio RN  Outcome: Progressing     Problem: ABCDS Injury Assessment  Goal: Absence of physical injury  1/13/2025 1131 by Nanci Richards LPN  Outcome: Progressing  Flowsheets (Taken 1/12/2025 1101 by Rodrigo Garcia LPN)  Absence of Physical Injury: Implement safety measures based on patient assessment  1/13/2025 0627 by Tank Ocasio RN  Outcome: Progressing     Problem: Skin/Tissue Integrity  Goal: Absence of new skin

## 2025-01-13 NOTE — DISCHARGE SUMMARY
DISCHARGE SUMMARY    Pt Name: Vladimir Muñiz  MRN: [id number]  YOB: 1950  Primary Care Physician: Eric Ramírez MD    Admit date:  1/6/2025 10:21 AM  Discharge date:  1/13/25  Disposition: Home in stable condition with home health  Admitting Diagnosis:   1. Tracheobronchomalacia determined by bronchoscopy    2. Tracheobronchomalacia    3. Dyspnea on exertion      Discharge Diagnosis:   Patient Active Problem List   Diagnosis Code    Hypertension I10    Hyperlipidemia E78.5    Ganglion cyst M67.40    RLS (restless legs syndrome) G25.81    Osteoarthritis M19.90    CAD (coronary artery disease) I25.10    Rectal bleeding K62.5    Constipation K59.00    DDD (degenerative disc disease), lumbar M51.369    Chronic lumbar radiculopathy M54.16    BPPV (benign paroxysmal positional vertigo) H81.10    Neuropathic pain of foot M79.2    Headache R51.9    GERD (gastroesophageal reflux disease) K21.9    Vertigo R42    Abdominal pain R10.9    Major depressive disorder, recurrent, severe without psychotic features (HCC) F33.2    Dyspnea on exertion R06.09    Tracheobronchomalacia determined by bronchoscopy J39.8    Tracheobronchomalacia J39.8    Urinary retention R33.9    Moderate malnutrition (HCC) E44.0     Consultants:  urology  Procedures/Diagnostic Test:   LARYNGOSCOPY/TRACHEOSCOPY  Bronchoscopy with Tracheal Stent Placement    Hospital Course: Vladimir ventura presented to the hospital on 1/6/2025 10:21 AM for scheduled procedure as noted above. He was admitted post operatively for diet advancement and increase in therapy. Patient had complicated post operative course with urinary retention requiring ortega catheter placement with urology consultation. He was evaluated by PT and OT and recommended to have 24 hour supervision upon discharge. Social work and case management worked with patient and he declined SNF. Patient was agreeable to home health and pending re-evaluation by PT on day of discharge. He

## 2025-01-13 NOTE — PROGRESS NOTES
Herbal and Nutritional Product Restrictions      The following herbal, alternative, and/or nutritional/dietary supplement product(s) has been discontinued per [facility] approved policy:    Fish Oil    Please reorder upon discharge if appropriate.    Thank you,  Shayy Hagen PharmD 1/6/2025 4:30 PM      
  Physician Progress Note      PATIENT:               ALCON MONTIEL  CSN #:                  [id number]  :                       1950  ADMIT DATE:       2025 10:21 AM  DISCH DATE:  RESPONDING  PROVIDER #:        Marine Murcia PA-C          QUERY TEXT:    Patient admitted with Tracheal Stent Placement. Noted to have Moderate   Malnutrition by dietary. If possible, please document in progress notes and   discharge summary if you are evaluating and /or treating any of the following:    The medical record reflects the following:  Risk Factors: 74 yr old  Clinical Indicators: Malnutrition Status:  Moderate malnutrition (01/10/25   1137)  Context:  Chronic Illness  Findings of the 6 clinical characteristics of malnutrition:  Energy Intake:  75% or less estimated energy requirements for 1 month or   longer  Weight Loss:   (7.5% in   4 months; pt reports   20# loss in the last year)  Body Fat Loss:   (moderate losses) Orbital, Buccal region, Triceps  Muscle Mass Loss:   (moderate losses) Clavicles (pectoralis & deltoids),   Scapula (trapezius), Temples (temporalis), Thigh (quadriceps), Calf   (gastrocnemius)  Fluid Accumulation:  No fluid accumulation  Treatment: Dietary consult, magic cups, Ensure    ASPEN Criteria:    https://aspenjournals.onlinelibrary.green.com/doi/full/10.1177/174018556632678  5  Options provided:  -- Protein calorie malnutrition moderate  -- Other - I will add my own diagnosis  -- Disagree - Not applicable / Not valid  -- Disagree - Clinically unable to determine / Unknown  -- Refer to Clinical Documentation Reviewer    PROVIDER RESPONSE TEXT:    This patient has moderate protein calorie malnutrition.    Query created by: Ladonna Monroe on 1/10/2025 1:26 PM      Electronically signed by:  Marine Murcia PA-C 2025 9:47 AM          
 [facility]  INPATIENT PHYSICAL THERAPY  DAILY NOTE  [facility] ORTHOPEDICS 7K - 7K-24/024-A      Discharge Recommendations:  Patient refusing SNF but not safe home alone. 24 hour assistance or supervision, Home with Home Health PT. 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 He has seen me twice before, including a bronchoscopy during his last visit, which confirmed his diagnosis. Approximately a month ago, he was referred to Dr. Alfaro at [facility], for evaluation of his tracheobronchial tree and his diagnosis of EDAC.  On 25, patient underwent Bronchoscopy with Tracheal Stent Placement.     Prior Level of Function:  Lives With: Daughter (Conchita Cedeno, APRN - CNP)  Home Layout: One level  Home Access: Stairs to enter with rails  Entrance Stairs - Number of Steps: 1  Entrance Stairs - Rails: Right  Home Equipment: Cane - Quad, Walker - Rolling, Wheelchair - Electric (c-pap)   Bathroom Shower/Tub: Tub/Shower unit  Bathroom Toilet: Standard  Bathroom Equipment: Grab bars in shower    Prior Level of Assist for ADLs: Independent  Prior Level of Assist for Homemaking: Independent  Prior Level of Assist for Transfers: Independent  Active : Yes  Additional Comments: Patient utilizes quad cane to ambulate with household and community distances.  Prior Level of Assist for Ambulation: Independent household ambulator, with or without device, Independent community ambulator, with or without device  Has the patient had two or more falls in the past year 
 [facility]  INPATIENT PHYSICAL THERAPY  DAILY NOTE  [facility] ORTHOPEDICS 7K - 7K-24/024-A      Discharge Recommendations: [facility]/Skilled Nursing Facility and 24 hour assistance or supervision  Equipment Recommendations: No               Time In: 1114  Time Out: 1143  Timed Code Treatment Minutes: 29 Minutes  Minutes: 29          Date: 1/10/2025  Patient Name: Vladimir Muñiz,  Gender:  male        MRN: [id number]  : 1950  (74 y.o.)     Referring Practitioner: Conchita Cedeno APRN - CNP  Diagnosis: Tracheobronchomalacia  Additional Pertinent Hx: per chart review: The patient is here for a follow-up evaluation of 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 He has seen me twice before, including a bronchoscopy during his last visit, which confirmed his diagnosis. Approximately a month ago, he was referred to Dr. Alfaro at [facility], for evaluation of his tracheobronchial tree and his diagnosis of EDAC.  On 25, patient underwent Bronchoscopy with Tracheal Stent Placement.     Prior Level of Function:  Lives With: Daughter (Conchita Cedeno, APRN - CNP)  Home Layout: One level  Home Access: Stairs to enter with rails  Entrance Stairs - Number of Steps: 1  Entrance Stairs - Rails: Right  Home Equipment: Cane - Quad, Walker - Rolling, Wheelchair - Electric (c-pap)   Bathroom Shower/Tub: Tub/Shower unit  Bathroom Toilet: Standard  Bathroom Equipment: Grab bars in shower    Prior Level of Assist for ADLs: Independent  Prior Level of Assist for Homemaking: Independent  Prior Level of Assist for Transfers: Independent  Active : Yes  Additional Comments: Patient utilizes quad cane to ambulate with household and community distances.  Prior Level of Assist for Ambulation: Independent household ambulator, with or without device, Independent community ambulator, with or without device  Has the patient had two or more falls in the past year or any fall with injury in 
1520 Pt to pacu with oral airway and simple mask. No acute distress at this time. VSS.   1522 Oral airway removed by patient. Secretions suctioned with yaunker. Bloody tinged. Pt placed on HHFNC.   1530 Pt lungs congested. Pt given breathing treatment. Encouraged to cough and deep breathe.   1534 CXR to bedside.   1545 Dr Meyers to bedside to assess patient.   1548 Report called to 4K RNAnn-Marie.   1550 Pt meets criteria to transfer out of PACU.   1608 Pt picked up by transport to go to K23. Pt placed on NRB for transfer.     
Comprehensive Nutrition Assessment    Type and Reason for Visit:  Initial, Consult (Dysphagia) No positive screen received - MST score is zero despite pt having had weight loss and ongoing very poor PO intake d/t swallowing function. Noted in ENT notes as well.    Nutrition Recommendations/Plan:   Would recommend SLP consult while inpatient (home care orders with notes to consult SLP) - pt was having increased difficulties tolerating thin liquids during conversation with this Registered Dietitian; RN noted that coughing had worsened since previous encounters. Reached out to ENT NP - addendum to ENT note that pt needs to mobilize secretions post tracheal stent placement and is tolerating diet; CXR ordered.  Pt on a dysphagia diet and texture is altered to assist in swallowing function. Would recommend continuation with altered textures to assist with swallowing function and consideration for evaluation of thin liquid consistency as pt with increased coughing after sipping water (not food).   Start ONS: Magic cups (BID) - will monitor tolerance of thin liquids and add Ensure PLUS/Enlive as appropriate.   Recommend to continue MVI use and bowel regimen as needed - last documented BM was 1/7.  Recommend home meal delivery services at discharge; pt states he is unable to cook for himself; gave recommendations on texture modifications to assist with swallowing function.     Malnutrition Assessment:  Malnutrition Status:  Moderate malnutrition (01/10/25 1137)    Context:  Chronic Illness     Findings of the 6 clinical characteristics of malnutrition:  Energy Intake:  75% or less estimated energy requirements for 1 month or longer  Weight Loss:   (7.5% in ~4 months; pt reports ~20# loss in the last year)     Body Fat Loss:   (moderate losses) Orbital, Buccal region, Triceps   Muscle Mass Loss:   (moderate losses) Clavicles (pectoralis & deltoids), Scapula (trapezius), Temples (temporalis), Thigh (quadriceps), Calf 
Department of Internal Medicine  Pulmonary  Attending Progress Note      SUBJECTIVE:  Pt seen and examined.  The patient is resting comfortable in the bed on HFNC for humidification and PEEP. The patient states his whole body aches with slight chest pain on the right, is mobilizing mucous and coughing it out. Hasn't had breakfast yet and hasn't been out of bed yet. No other noted contributing factors/complaints. Other ROS systems negative.    OBJECTIVE      Medications    Current Facility-Administered Medications: amitriptyline (ELAVIL) tablet 50 mg, 50 mg, Oral, Nightly  amLODIPine (NORVASC) tablet 5 mg, 5 mg, Oral, Daily  lisinopril (PRINIVIL;ZESTRIL) tablet 40 mg, 40 mg, Oral, Daily  ezetimibe (ZETIA) tablet 10 mg, 10 mg, Oral, Daily  calcium elemental (OSCAL) tablet 500 mg, 500 mg, Oral, Daily  isosorbide mononitrate (IMDUR) extended release tablet 60 mg, 60 mg, Oral, QAM  pantoprazole (PROTONIX) tablet 40 mg, 40 mg, Oral, QAM AC  pravastatin (PRAVACHOL) tablet 40 mg, 40 mg, Oral, Nightly  multivitamin 1 tablet, 1 tablet, Oral, Daily  terbinafine (LAMISIL) tablet 250 mg, 250 mg, Oral, Daily  sulfaSALAzine (AZULFIDINE) tablet 500 mg, 500 mg, Oral, Daily  sotalol (BETAPACE) tablet 80 mg, 80 mg, Oral, BID  sodium chloride flush 0.9 % injection 5-40 mL, 5-40 mL, IntraVENous, 2 times per day  sodium chloride flush 0.9 % injection 5-40 mL, 5-40 mL, IntraVENous, PRN  0.9 % sodium chloride infusion, , IntraVENous, PRN  polyethylene glycol (GLYCOLAX) packet 17 g, 17 g, Oral, Daily PRN  ondansetron (ZOFRAN-ODT) disintegrating tablet 4 mg, 4 mg, Oral, Q8H PRN **OR** ondansetron (ZOFRAN) injection 4 mg, 4 mg, IntraVENous, Q6H PRN  sodium chloride nebulizer 0.9 % solution 3 mL, 3 mL, Nebulization, TID  sodium chloride (Inhalant) 3 % nebulizer solution 4 mL, 4 mL, Nebulization, PRN  ceFAZolin (ANCEF) 2,000 mg in sterile water 20 mL IV syringe, 2,000 mg, IntraVENous, Q8H  acetaminophen (TYLENOL) tablet 650 mg, 650 mg, Oral, 
Department of Otolaryngology  Progress Note    Chief Complaint:   POD 2 LARYNGOSCOPY/TRACHEOSCOPY  Bronchoscopy with Tracheal Stent Placement    SUBJECTIVE 1/7/25:  Patient voices feeling rough this morning with soreness 'all over' and some pressure to right side of chest radiating from coughing. He endorses having multiple coughing fits overnight/this morning and struggling with talking for extensive periods of time due to shortness of breath. Patient remains on HFNC. No hemoptysis or fevers/chills. He has not been out of bed yet this morning. Tolerating clear liquid diet without trouble. Patient had urinary retention >800 ml and difficulty with straight cath; after multiple attempts ortega catheter secured and UA with reflex to culture sent with abnormal findings. Urology consulted this morning for assistance with management. Patient notes no prior urologic concerns with other surgical procedures and denies pre-operative dysuria/urgency.     SUBJECTIVE 1/8/25:  Patient denies any acute events overnight/this morning. Voices he still feels fatigued throughout entire body worse with coughing; however this has been ongoing since prior to surgery. Patient did not get up out of bed reportedly yesterday. Pending PT evaluation this morning. He states he declined any home health assistance as he doesn't 'want to be asked a lot of questions'. I reviewed with him this is not the intent. Patient denies fevers/chills and feels his breathing overall is stable. No hemoptysis.      REVIEW OF SYSTEMS:    Pertinent positives as noted in the HPI. All other systems reviewed and negative.    OBJECTIVE      Physical  VITALS:  /66   Pulse 66   Temp 99.1 °F (37.3 °C) (Oral)   Resp 16   Ht 1.753 m (5' 9\")   Wt 79.4 kg (175 lb 0.7 oz)   SpO2 95%   BMI 25.85 kg/m²     This is a 74 y.o. male. Patient is alert and oriented to person, place and time. Patient resting in bed with eyes closed receiving nebulized therapy upon arrival 
Department of Otolaryngology  Progress Note    Chief Complaint:   POD 4 LARYNGOSCOPY/TRACHEOSCOPY  Bronchoscopy with Tracheal Stent Placement    SUBJECTIVE 1/10/2025:  Patient transferred off stepdown floor yesterday for discharge planning and further therapy. He denies any acute post surgical events yesterday. Patient continues to decline SNF upon discharge; is agreeable to home health upon discharge. PT evaluated two days ago and recommended 24 hour supervision upon discharge. Will have OT eval/treat today. No fevers/chills or dyspnea. Patient states he ambulated in the room this morning. Tolerating regular diet. Still reporting cough.      REVIEW OF SYSTEMS:    Pertinent positives as noted in the HPI. All other systems reviewed and negative.    OBJECTIVE      Physical  VITALS:  /60   Pulse 66   Temp 98 °F (36.7 °C) (Oral)   Resp 18   Ht 1.753 m (5' 9\")   Wt 79.4 kg (175 lb 0.7 oz)   SpO2 94%   BMI 25.85 kg/m²     This is a 74 y.o. male. Patient is alert and oriented to person, place and time. Patient resting in bed eating breakfast. Slightly improved mood/demeanor this morning.  Breathing unlabored without acute respiratory distress. No acute signs of dyspnea at rest. On room air. Neck soft without palpable crepitus, induration, or fluctuance. Auscultation notes clear lung sounds throughout. No crackles/rhonchi. Neck auscultation without stridor or restriction of flow.     Data  CBC with Differential:    Lab Results   Component Value Date/Time    WBC 10.2 01/09/2025 03:25 AM    RBC 4.44 01/09/2025 03:25 AM    HGB 13.3 01/09/2025 03:25 AM    HCT 41.2 01/09/2025 03:25 AM     01/09/2025 03:25 AM    MCV 92.8 01/09/2025 03:25 AM    MCH 30.0 01/09/2025 03:25 AM    MCHC 32.3 01/09/2025 03:25 AM    RDW 13.8 05/13/2024 09:20 AM    NRBC 0 01/09/2025 03:25 AM    LYMPHOPCT 28.0 05/13/2024 09:20 AM    MONOPCT 11.7 01/09/2025 03:25 AM    EOSPCT 2.1 05/13/2024 09:20 AM    BASOPCT 0.5 05/13/2024 09:20 AM    
Department of Otolaryngology  Progress Note    Chief Complaint:  POD 1 LARYNGOSCOPY/TRACHEOSCOPY  Bronchoscopy with Tracheal Stent Placement    SUBJECTIVE 1/7/25:  Patient voices feeling rough this morning with soreness 'all over' and some pressure to right side of chest radiating from coughing. He endorses having multiple coughing fits overnight/this morning and struggling with talking for extensive periods of time due to shortness of breath. Patient remains on HFNC. No hemoptysis or fevers/chills. He has not been out of bed yet this morning. Tolerating clear liquid diet without trouble. Patient had urinary retention >800 ml and difficulty with straight cath; after multiple attempts ortega catheter secured and UA with reflex to culture sent with abnormal findings. Urology consulted this morning for assistance with management. Patient notes no prior urologic concerns with other surgical procedures and denies pre-operative dysuria/urgency.      REVIEW OF SYSTEMS:    Pertinent positives as noted in the HPI. All other systems reviewed and negative.    OBJECTIVE      Physical  VITALS:  BP (!) 152/83   Pulse 70   Temp 97.5 °F (36.4 °C) (Oral)   Resp 22   Ht 1.753 m (5' 9\")   Wt 79.4 kg (175 lb 0.7 oz)   SpO2 100%   BMI 25.85 kg/m²     This is a 74 y.o. male. Patient is alert and oriented to person, place and time. Patient clearly anxious upon arrival; eyes closed and having verbal outbursts to nursing staff upon arrival with repositioning in the bed. He is complaining of severe dyspnea; SpO2 100% on HFNC. Auscultation notes mild expiratory polyphonic wheeze more prominent to left side than right. No crackles or stridor. Anterior neck soft and without audible stridor/restriction. Oropharynx clear and intact without purulent debris/bleeding. Edentulous. Afebrile. No evidence of cyanosis or acute labored breathing after repositioning.    Patient then able to speak for several minutes at length >10 without desaturation 
Department of Otolaryngology  Progress Note    Chief Complaint:  POD 5 LARYNGOSCOPY/TRACHEOSCOPY  Bronchoscopy with Tracheal Stent Placement    SUBJECTIVE 1/11/2025:  Pt up in chair on evaluation and denies acute overnight events. He states that he feels much better today and says that is cough and sore throat overnight have significantly reduced. He also states that he walked the halls with nursing which was confirmed with them. He continues to deny SNF but is hopeful that if PT re-evaluates that he will do much better and be safe to go home with HH. He denies SOB, chest pain, fevers, chills, hemoptysis, and dizziness.     A complete multi-organ review of systems was performed using a new patient questionnaire, and reviewed by me.  ENT:  negative except as noted in HPI  CONSTITUTIONAL:  negative except as noted in HPI  EYES:  negative except as noted in HPI  RESPIRATORY:  negative except as noted in HPI  CARDIOVASCULAR:  negative except as noted in HPI  GASTROINTESTINAL:  negative except as noted in HPI  GENITOURINARY:  negative except as noted in HPI  MUSCULOSKELETAL:  negative except as noted in HPI  SKIN:  negative except as noted in HPI  ENDOCRINE/METABOLIC: negative except as noted in HPI  HEMATOLOGIC/LYMPHATIC:  negative except as noted in HPI  ALLERGY/IMMUN: negative except as noted in HPI  NEUROLOGICAL:  negative except as noted in HPI  BEHAVIOR/PSYCH:  negative except as noted in HPI    OBJECTIVE      Physical  VITALS:  /62   Pulse 67   Temp 98.2 °F (36.8 °C) (Oral)   Resp 16   Ht 1.753 m (5' 9\")   Wt 79.4 kg (175 lb 0.7 oz)   SpO2 93%   BMI 25.85 kg/m²     75 yo M resting comfortably in hospital chair without signs of acute respiratory distress.  His mood is improved today and patient is hopeful that his symptoms are improving and that he was able to walk around comfortably.  Patient is on room air.  Neck soft without palpable crepitus, induration, or fluctuance.  Lungs clear to auscultation 
Department of Otolaryngology  Progress Note    Chief Complaint:  POD 6 LARYNGOSCOPY/TRACHEOSCOPY  Bronchoscopy with Tracheal Stent Placement    Patient without acute overnight events.  Patient states that he has not move around is much as yesterday but is planning to take a walk later today.  I encouraged him to do leg raises and knee raises while sitting up in a chair to help with his strength.  Patient also endorsed that he had a bowel movement overnight.  Patient is hopeful that on PT reevaluation tomorrow that he will do much better and is hopeful that home health will be enough for his rehabilitation as he still refuses SNF.  He also endorses that his son is able to help him in the mornings if needed and that he has 2 neighbors that he could call if he were to need something urgently.      ASSESSMENT AND PLAN    POD 6 LARYNGOSCOPY/TRACHEOSCOPY  Bronchoscopy with Tracheal Stent Placement     -IV Ancef therapy completed.  -Urinary retention with difficult ortega placement and abnormal UA; urology consulted and urine sent for culture; appreciate assistance. Patient will be discharged with ortega in place and outpatient void trial.  -Mucomyst to continue for secretions; Albuterol added prior to mucomyst administration  -Cepacol lozenges for sore throat  -PT eval assistance appreciated; recommended discharge with 24 hour supervision. Given reported improvement Saturday will await PT evaluation and discussed with social work Monday since there is no social work in house per nursing.  -Tolerating regular diet  -Continue scheduled nebulizers  -Discharge pending care coordination and continued improvement in ambulation.        Electronically signed by Marine Murcia PA-C on 1/12/2025 at 12:28 PM     
Follow all instructions given by your physician  Do not eat or drink anything after midnight prior to surgery(includes water, chewing gum, mints and ice chips)  Sips of water am of surgery with allowed medications  May brush teeth   Do not smoke or chew tobacco, drink alcoholic beverages or use any illicit drugs for 24 hours prior to surgery  Bring insurance info and photo ID  Bring pertinent paperwork with you from Doctor or surgeons's office  Wear clean comfortable, loose-fitting clothing  No make-up, nail polish, jewelry, piercings, or contact lenses to be worn day of surgery  No glue on dentures morning of surgery; you will be asked to remove them for surgery. Case for glasses.  Shower the night before and the morning of surgery with cleansing soap provided or a liquid antibacterial soap, dry with new fresh clean towel after each shower, no lotions, creams or powder.  Clean sheets and pillowcase on bed night before surgery  Bring medications in original bottles, Bring rescue inhalers with you  Bring CPAP/BIPAP machine if you have one ( you may be charged if one is needed in recovery room ) Yes     Do you have a DNR?   Please Bring Healthcare Directive or Healthcare Power of  in so we can scan it into your chart.  Yes, on file    Our pharmacy has a Meds to Beds program where they will deliver any new prescriptions you may have to your room before you leave. Our Pharmacy will clear it through your insurance; for example (same co pay). This enables you to take your new RX as soon as you need when you get home and avoids stop/wait delays on the way home.  Please have a form of payment with you and have someone designated as your Pharmacy contact with their phone # as you may not feel well or still be under the influence of anesthesia.    Please refer to the SSI-Surgical Site Infection Flyer you hopefully received in the mail-together we can prevent infections; signs and symptoms reviewed.  When discharged be 
PAT call attempted, patient unavailable, left message to please call us back at your earliest convenience; [phone]  
Patient received sacramental anointing by a . If you are in need of  support, please call [phone]. If you are in need of a  after 6:30pm, please call the house supervisor for the on-call .      Tesha Cordero  [facility] Health Coordinator  [phone]   
[facility]  [facility] ORTHOPEDICS 7K  Occupational Therapy  Daily Note    Discharge Recommendations: Home with assist as needed and Home with Home Health OT  Equipment Recommendations:   If pt continues to refuse SNF, he will need home health OT      Time In: 1002  Time Out: 1025  Timed Code Treatment Minutes: 23 Minutes  Minutes: 23          Date: 2025  Patient Name: Vladimir Muñiz,   Gender: male      Room: [facility]/024-  MRN: [id number]  : 1950  (74 y.o.)  Referring Practitioner: Conchita Cedeno APRN - CNP  Diagnosis: Tracheobronchomalacia  Additional Pertinent Hx: 75 yo M presents d/t 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 Approximately a month ago, he was referred to Dr. Alfaro at [facility], for evaluation of his tracheobronchial tree and his diagnosis of EDAC.  On 25, patient underwent Bronchoscopy with Tracheal Stent Placement.    Restrictions/Precautions:  Restrictions/Precautions: Fall Risk     Social/Functional History:  Lives With: Daughter (Conchita Cedeno, APRN - CNP)  Home Layout: One level  Home Access: Stairs to enter with rails  Entrance Stairs - Number of Steps: 1  Entrance Stairs - Rails: Right  Home Equipment: Cane - Quad, Walker - Rolling, Wheelchair - Electric (c-pap)   Bathroom Shower/Tub: Tub/Shower unit  Bathroom Toilet: Standard  Bathroom Equipment: Grab bars in shower       Prior Level of Assist for ADLs: Independent  Prior Level of Assist for Homemaking: Independent  Prior Level of Assist for Transfers: Independent  Prior Level of Assist for Ambulation: Independent household ambulator, with or without device, Independent community ambulator, with or without device  Has the patient had two or more falls in the past year or any fall with injury in the past year?: Yes (4-5 falls w/o injury per patient)    Active : Yes     Additional Comments: Patient utilizes quad cane to ambulate with household and community 
Per Elvia at Dr Meyers  Patient no longer takes Effient   
Pt admitted to [facility] room 8 and oriented to unit. SCD sleeves applied. Nares swabbed. Pt verbalized permission for first name, last initial and physicians name on white board. SDS board and discharge criteria explained, pt and family verbalized understanding. Pt denies thoughts of harming self or others. Call light in reach. Family at the bedside.  Vladimir [phone]  
Pt discharged home with family. RN removed IV access. AVS education completed with pt. Pt verbalized understanding of all aftercare and follow up instructions. No complaints or signs of distress noted at discharge .  
Spiritual Health History and Assessment/Progress Note  [facility]    (P) Initial Encounter, Pre-Procedural,  ,  ,      Name: Vladimir Muñiz MRN: [id number]    Age: 74 y.o.     Sex: male   Language: English   Jew: Judaism   Tracheobronchomalacia     Date: 1/6/2025            Total Time Calculated: (P) 6 min              Spiritual Assessment began in [facility] ICU STEPDOWN TELEMETRY 4K        Referral/Consult From: (P) Family   Encounter Overview/Reason: (P) Initial Encounter, Pre-Procedural  Service Provided For: (P) Patient and family together    Awa, Belief, Meaning:   Patient identifies as spiritual  Family/Friends identify as spiritual      Importance and Influence:  Patient has spiritual/personal beliefs that influence decisions regarding their health  Family/Friends have spiritual/personal beliefs that influence decisions regarding the patient's health    Community:  Patient is connected with a spiritual community  Family/Friends are connected with a spiritual community:    Assessment and Plan of Care:     Patient Interventions include: Facilitated expression of thoughts and feelings  Family/Friends Interventions include: Facilitated expression of thoughts and feelings    Patient Plan of Care: Spiritual Care available upon further referral  Family/Friends Plan of Care: Spiritual Care available upon further referral    Electronically signed by JAMARCUS Cruz on 1/6/2025 at 6:34 PM   
Spiritual Health History and Assessment/Progress Note  [facility]    (P) Spiritual/Emotional Needs, (P) Emotional distress (caused mainly by the lack of dietary support at home. There is no one available to cook and feed the patient. I have given several practical suggestions that involve social service organizations, extended family members and Mosque.),  ,      Name: Vladimir Muñiz MRN: [id number]    Age: 74 y.o.     Sex: male   Language: English   Mormonism: Yarsani   Tracheobronchomalacia     Date: 1/13/2025            Total Time Calculated: (P) 20 min              Spiritual Assessment began in [facility] ORTHOPEDICS 7K        Referral/Consult From: (P) Nurse   Encounter Overview/Reason: (P) Spiritual/Emotional Needs  Service Provided For: (P) Patient and family together    Awa, Belief, Meaning:   Patient identifies as spiritual  Family/Friends identify as spiritual      Importance and Influence:  Patient has spiritual/personal beliefs that influence decisions regarding their health. The lack of dietary support at home is a problem. No one at home to cook meals. I have spoken with his grandson and provided him with several suggestions.   Family/Friends have spiritual/personal beliefs that influence decisions regarding the patient's health    Community:  Patient is connected with a spiritual community. Member of [facility] [employer]. I advised him to speak with the  also. I offered to get in touch with the , but I have been told that he is on vacation.   Family/Friends are connected with a spiritual community:    Assessment and Plan of Care:     Patient Interventions include: Facilitated expression of thoughts and feelings  Family/Friends Interventions include: Facilitated expression of thoughts and feelings    Patient Plan of Care: Spiritual Care available upon further referral  Family/Friends Plan of Care: Spiritual Care available upon further 
Spoke with Dr. Sanders's office.  Questioning if patient still on Effient?  Pt states he is still taking but was not indicated on Dr. Sanders's clearance form.    Office will send to nurse to review.  Also requesting for last EKG.  Last EKG was done at [facility] on 9/19/24.  Office will fax to KAREN  
[facility]  INPATIENT PHYSICAL THERAPY  EVALUATION  [facility] ICU STEPDOWN TELEMETRY 4K - 4K-23/023-A    Discharge Recommendations: 24 hour supervision or assist, Therapy recommended at discharge (If discharged this date, would recommend SNF.  Patient hoping to return home.  He would need 24 hour care at this time.)  Equipment Recommendations: No    Does need family to get walker out of his shed.         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 He has seen me twice before, including a bronchoscopy during his last visit, which confirmed his diagnosis. Approximately a month ago, he was referred to Dr. Alfaro at [facility], for evaluation of his tracheobronchial tree and his diagnosis of EDAC.  On 25, patient underwent Bronchoscopy with Tracheal Stent Placement.     Restrictions/Precautions:  Restrictions/Precautions: Fall Risk     Required Braces or Orthoses?: No    Subjective:  Chart Reviewed: Yes  Patient assessed for rehabilitation services?: Yes  Subjective: Nurse approved visit.  Patient lying in bed and states he has not been out of bed following procedure on 25.  Patient had high flow that was present to assist with secretions per nurse and could be removed for therapy and re-donned following treatment.    General:  Orientation Level: Oriented X4  Vision: Impaired  Vision Exceptions: Wears glasses at all times  Hearing: Exceptions to WFL  Hearing Exceptions: Bilateral hearing aid (Lost hearing aides)     Pain: --/10: denied pain    Vitals: Blood Pressure: 134/69  Oxygen: 95% on 
OT with decreased strength, balance, and activity tolerance impacting safety and IND with ADLs. Pt would benefit from further skilled OT to address barriers in order to return to PLOF. Without OT, pt is at risk for falls, hospital readmissions, and increased caregiver burden.    Performance deficits / Impairments: Decreased functional mobility , Decreased ADL status, Decreased strength, Decreased safe awareness, Decreased endurance, Decreased balance, Decreased high-level IADLs  Prognosis: Good  REQUIRES OT FOLLOW-UP: Yes  Decision Making: Medium Complexity    Treatment Initiated: Treatment and education initiated within context of evaluation.  Evaluation time included review of current medical information, gathering information related to past medical, social and functional history, completion of standardized testing, formal and informal observation of tasks, assessment of data and development of plan of care and goals.  Treatment time included skilled education and facilitation of tasks to increase safety and independence with ADL's for improved functional independence and quality of life.    Patient Education:          Patient Education  Education Given To: Patient  Education Provided: Role of Therapy;Plan of Care;ADL Adaptive Strategies;Transfer Training;Fall Prevention Strategies  Education Method: Demonstration;Verbal  Barriers to Learning: None  Education Outcome: Verbalized understanding    Plan:  Times Per Week: 5x  Current Treatment Recommendations: Strengthening, Balance training, Functional mobility training, Endurance training, Pain management, Safety education & training, Patient/Caregiver education & training, Equipment evaluation, education, & procurement, Self-Care / ADL, Home management training.  See long-term goal time frame for expected duration of plan of care.  If no long-term goals established, a short length of stay is anticipated.    Goals:  Patient goals : go home  Short Term Goals  Time 
5.0 R 7.5 6.0 R 6.0 R   Protein, UA  NEGATIVE 30 Abnormal    NEGATIVE       Urobilinogen, Urine  0.0 - 1.0 eu/dl 0.2 2.0 E.U./dL High  R 0.2 <2.0 E.U./dL R <2.0 E.U./dL R   Nitrite, Urine  NEGATIVE NEGATIVE Negative R NEGATIVE Negative R Negative R   Leukocyte Esterase, Urine  NEGATIVE SMALL Abnormal    NEGATIVE       Color, UA  STRAW-YELLOW RED Abnormal  Yellow R YELLOW R Yellow R Yellow R   Character, Urine  CLEAR-SL CLOUD CLOUDY Abnormal                   Inpatient Medications  Current Facility-Administered Medications: albuterol (PROVENTIL) (2.5 MG/3ML) 0.083% nebulizer solution 2.5 mg, 2.5 mg, Nebulization, BID  tamsulosin (FLOMAX) capsule 0.4 mg, 0.4 mg, Oral, Daily  polyethylene glycol (GLYCOLAX) packet 17 g, 17 g, Oral, Daily  magnesium hydroxide (MILK OF MAGNESIA) 400 MG/5ML suspension 30 mL, 30 mL, Oral, Daily PRN  amitriptyline (ELAVIL) tablet 50 mg, 50 mg, Oral, Nightly  amLODIPine (NORVASC) tablet 5 mg, 5 mg, Oral, Daily  lisinopril (PRINIVIL;ZESTRIL) tablet 40 mg, 40 mg, Oral, Daily  ezetimibe (ZETIA) tablet 10 mg, 10 mg, Oral, Daily  calcium elemental (OSCAL) tablet 500 mg, 500 mg, Oral, Daily  isosorbide mononitrate (IMDUR) extended release tablet 60 mg, 60 mg, Oral, QAM  pantoprazole (PROTONIX) tablet 40 mg, 40 mg, Oral, QAM AC  pravastatin (PRAVACHOL) tablet 40 mg, 40 mg, Oral, Nightly  multivitamin 1 tablet, 1 tablet, Oral, Daily  terbinafine (LAMISIL) tablet 250 mg, 250 mg, Oral, Daily  sulfaSALAzine (AZULFIDINE) tablet 500 mg, 500 mg, Oral, Daily  sotalol (BETAPACE) tablet 80 mg, 80 mg, Oral, BID  sodium chloride flush 0.9 % injection 5-40 mL, 5-40 mL, IntraVENous, 2 times per day  sodium chloride flush 0.9 % injection 5-40 mL, 5-40 mL, IntraVENous, PRN  0.9 % sodium chloride infusion, , IntraVENous, PRN  polyethylene glycol (GLYCOLAX) packet 17 g, 17 g, Oral, Daily PRN  ondansetron (ZOFRAN-ODT) disintegrating tablet 4 mg, 4 mg, Oral, Q8H PRN **OR** ondansetron (ZOFRAN) injection 4 mg, 4 mg,

## 2025-01-14 ENCOUNTER — HOSPITAL ENCOUNTER (EMERGENCY)
Age: 75
Discharge: HOME | End: 2025-01-14
Attending: EMERGENCY MEDICINE
Payer: COMMERCIAL

## 2025-01-14 VITALS
OXYGEN SATURATION: 100 % | HEART RATE: 75 BPM | TEMPERATURE: 98 F | RESPIRATION RATE: 14 BRPM | SYSTOLIC BLOOD PRESSURE: 113 MMHG | DIASTOLIC BLOOD PRESSURE: 80 MMHG

## 2025-01-14 DIAGNOSIS — N39.0 URINARY TRACT INFECTION WITH HEMATURIA, SITE UNSPECIFIED: Primary | ICD-10-CM

## 2025-01-14 DIAGNOSIS — R31.9 URINARY TRACT INFECTION WITH HEMATURIA, SITE UNSPECIFIED: Primary | ICD-10-CM

## 2025-01-14 DIAGNOSIS — N32.89 BLADDER SPASMS: ICD-10-CM

## 2025-01-14 LAB
ANION GAP SERPL CALC-SCNC: 11 MEQ/L (ref 8–16)
BACTERIA URNS QL MICRO: ABNORMAL /HPF
BILIRUB UR QL STRIP.AUTO: NEGATIVE
BUN SERPL-MCNC: 22 MG/DL (ref 7–22)
CALCIUM SERPL-MCNC: 9.9 MG/DL (ref 8.5–10.5)
CASTS #/AREA URNS LPF: ABNORMAL /LPF
CASTS 2: ABNORMAL /LPF
CHARACTER UR: ABNORMAL
CHLORIDE SERPL-SCNC: 96 MEQ/L (ref 98–111)
CO2 SERPL-SCNC: 27 MEQ/L (ref 23–33)
COLOR, UA: ABNORMAL
CREAT SERPL-MCNC: 1.4 MG/DL (ref 0.4–1.2)
CRYSTALS URNS MICRO: ABNORMAL
EKG ATRIAL RATE: 79 BPM
EKG P AXIS: -9 DEGREES
EKG P-R INTERVAL: 182 MS
EKG Q-T INTERVAL: 374 MS
EKG QRS DURATION: 78 MS
EKG QTC CALCULATION (BAZETT): 428 MS
EKG R AXIS: -19 DEGREES
EKG T AXIS: 39 DEGREES
EKG VENTRICULAR RATE: 79 BPM
EPITHELIAL CELLS, UA: ABNORMAL /HPF
GFR SERPL CREATININE-BSD FRML MDRD: 52 ML/MIN/1.73M2
GLUCOSE SERPL-MCNC: 97 MG/DL (ref 70–108)
GLUCOSE UR QL STRIP.AUTO: NEGATIVE MG/DL
HGB UR QL STRIP.AUTO: ABNORMAL
KETONES UR QL STRIP.AUTO: ABNORMAL
MISCELLANEOUS 2: ABNORMAL
NITRITE UR QL STRIP: NEGATIVE
OSMOLALITY SERPL CALC.SUM OF ELEC: 271.5 MOSMOL/KG (ref 275–300)
PH UR STRIP.AUTO: 5.5 [PH] (ref 5–9)
POTASSIUM SERPL-SCNC: 4.7 MEQ/L (ref 3.5–5.2)
PROT UR STRIP.AUTO-MCNC: 100 MG/DL
RBC URINE: > 100 /HPF
RENAL EPI CELLS #/AREA URNS HPF: ABNORMAL /[HPF]
SODIUM SERPL-SCNC: 134 MEQ/L (ref 135–145)
SP GR UR REFRACT.AUTO: 1.01 (ref 1–1.03)
UROBILINOGEN, URINE: 1 EU/DL (ref 0–1)
WBC #/AREA URNS HPF: ABNORMAL /HPF
WBC #/AREA URNS HPF: ABNORMAL /[HPF]
YEAST LIKE FUNGI URNS QL MICRO: ABNORMAL

## 2025-01-14 PROCEDURE — 80048 BASIC METABOLIC PNL TOTAL CA: CPT

## 2025-01-14 PROCEDURE — 81001 URINALYSIS AUTO W/SCOPE: CPT

## 2025-01-14 PROCEDURE — 93010 ELECTROCARDIOGRAM REPORT: CPT | Performed by: INTERNAL MEDICINE

## 2025-01-14 PROCEDURE — 93005 ELECTROCARDIOGRAM TRACING: CPT | Performed by: EMERGENCY MEDICINE

## 2025-01-14 PROCEDURE — 6370000000 HC RX 637 (ALT 250 FOR IP)

## 2025-01-14 PROCEDURE — 51798 US URINE CAPACITY MEASURE: CPT

## 2025-01-14 PROCEDURE — 99284 EMERGENCY DEPT VISIT MOD MDM: CPT

## 2025-01-14 PROCEDURE — 87086 URINE CULTURE/COLONY COUNT: CPT

## 2025-01-14 PROCEDURE — 36415 COLL VENOUS BLD VENIPUNCTURE: CPT

## 2025-01-14 RX ORDER — PHENAZOPYRIDINE HYDROCHLORIDE 100 MG/1
200 TABLET, FILM COATED ORAL ONCE
Status: COMPLETED | OUTPATIENT
Start: 2025-01-14 | End: 2025-01-14

## 2025-01-14 RX ORDER — CEPHALEXIN 500 MG/1
500 CAPSULE ORAL 4 TIMES DAILY
Qty: 28 CAPSULE | Refills: 0 | Status: SHIPPED | OUTPATIENT
Start: 2025-01-14 | End: 2025-01-21

## 2025-01-14 RX ORDER — OXYBUTYNIN CHLORIDE 5 MG/1
5 TABLET, EXTENDED RELEASE ORAL DAILY
Qty: 30 TABLET | Refills: 3 | Status: SHIPPED | OUTPATIENT
Start: 2025-01-14

## 2025-01-14 RX ADMIN — CEPHALEXIN 500 MG: 250 CAPSULE ORAL at 21:19

## 2025-01-14 RX ADMIN — PHENAZOPYRIDINE HYDROCHLORIDE 200 MG: 100 TABLET ORAL at 18:42

## 2025-01-14 ASSESSMENT — PAIN - FUNCTIONAL ASSESSMENT
PAIN_FUNCTIONAL_ASSESSMENT: 0-10
PAIN_FUNCTIONAL_ASSESSMENT: NONE - DENIES PAIN

## 2025-01-14 ASSESSMENT — PAIN SCALES - GENERAL: PAINLEVEL_OUTOF10: 2

## 2025-01-14 NOTE — ED PROVIDER NOTES
[facility] EMERGENCY DEPARTMENT - VISIT NOTE    Patient Name: Vladimir Muñiz  MRN: [id number]  YOB: 1950  Date of Evaluation: 1/14/2025  Treating Resident Physician: Basil Brown MD  Supervising Physician: Rolf Dorsey DO    CHIEF COMPLAINT       Chief Complaint   Patient presents with    Dysuria    Post-op Problem       HISTORY OF PRESENT ILLNESS    HPI    History obtained from chart review and the patient.    Vladimir is a 74 y.o. old male who presents to the emergency department by Walk In for evaluation of urinary urgency.  Patient had recent hospitalization from 1/6/2025 - 1/13/2025 with tracheobronchomalacia and had a tracheal stent placed.  After the surgery, the patient was having postoperative urinary retention and had a Russ placed with urology.  Prior to discharge yesterday, the patient is adamant that he wanted to have it removed moved.  Despite urology recommendations, the patient was able to have somebody remove the Russ for him.  Since that time, the patient has been dealing with episodes of intense urinary urgency, where he feels he needs to go to the bathroom, will be incontinent of a small amount of urine, and have suprapubic abdominal cramping.  This whole scenario the last 7 to 10 seconds.    Chart reviewed, relevant history summarized in HPI above.      REVIEW OF SYSTEMS   Review of Systems  Negative unless documented in HPI    PAST MEDICAL AND SURGICAL HISTORY   Vladimir  has a past medical history of Arrhythmia, CAD (coronary artery disease), Dizziness, Ganglion cyst, GERD (gastroesophageal reflux disease) (04/15/2014), Headache (01/15/2014), Hyperlipidemia, Hypertension, Major depression (12/15/2014), Osteoarthritis, Restless legs syndrome, RLS (restless legs syndrome), SOB (shortness of breath), and Urinary retention (1/7/2025).    Vladimir  has a past surgical history that includes Testicle surgery; Cholecystectomy, laparoscopic; Cholecystectomy, laparoscopic; back surgery; Neck

## 2025-01-14 NOTE — TELEPHONE ENCOUNTER
Called pt to schedule a fu. He stated he was heading back to hospital. He is not feeling well. He will call when discharged

## 2025-01-14 NOTE — ED NOTES
Pt to ED via intake with grandson with c/o urinary incontinence.Pt reports they were discharged yesterday after a procedure with ENT. Pt reports they were up three times last night and had urine incontinence each time. He reports his home health nurse advised him to come back to the hospital for evaluation. Pt continues to have a productive cough which he reports has been ongoing. Pt VSS. He denies any hematuria.

## 2025-01-15 NOTE — DISCHARGE INSTRUCTIONS
Take the antibiotic and the antispasm medicine as prescribed.  Follow-up with [facility] urology team.

## 2025-01-15 NOTE — DISCHARGE INSTR - COC
Continuity of Care Form    Patient Name: Vladimir Muñiz   :  1950  MRN:  [id number]    Admit date:  2025  Discharge date:  ***    Code Status Order: Prior   Advance Directives:   Advance Care Flowsheet Documentation             Admitting Physician:  No admitting provider for patient encounter.  PCP: Eric Ramírez MD    Discharging Nurse: ***  Discharging Hospital Unit/Room#: 42/042A  Discharging Unit Phone Number: ***    Emergency Contact:   Extended Emergency Contact Information  Primary Emergency Contact: Vladimir Muñiz  [facility]  Home Phone: [phone]  Mobile Phone: [phone]  Relation: Child    Past Surgical History:  Past Surgical History:   Procedure Laterality Date    BACK SURGERY      BRONCHOSCOPY N/A 2025    Bronchoscopy with Tracheal Stent Placement performed by Gustavo Mcnulty DO at [facility] OR    CHOLECYSTECTOMY, LAPAROSCOPIC      CHOLECYSTECTOMY, LAPAROSCOPIC      LARYNGOSCOPY N/A 2025    LARYNGOSCOPY/TRACHEOSCOPY performed by Jeremy Meyers MD at [facility] OR    LUNG REMOVAL, TOTAL N/A     NECK SURGERY      TESTICLE SURGERY         Immunization History:   Immunization History   Administered Date(s) Administered    Influenza Whole 2011    Pneumococcal, PCV-13, PREVNAR 13, (age 6w+), IM, 0.5mL 2016    TDaP, ADACEL (age 10y-64y), BOOSTRIX (age 10y+), IM, 0.5mL 2016    Zoster Live (Zostavax) 2016       Active Problems:  Patient Active Problem List   Diagnosis Code    Hypertension I10    Hyperlipidemia E78.5    Ganglion cyst M67.40    RLS (restless legs syndrome) G25.81    Osteoarthritis M19.90    CAD (coronary artery disease) I25.10    Rectal bleeding K62.5    Constipation K59.00    DDD (degenerative disc disease), lumbar M51.369    Chronic lumbar radiculopathy M54.16    BPPV (benign paroxysmal positional vertigo) H81.10    Neuropathic pain of foot M79.2    Headache R51.9    GERD (gastroesophageal reflux disease) K21.9    Vertigo

## 2025-01-15 NOTE — ED NOTES
Patient resting in bed. Respirations easy and unlabored. No distress noted. Call light within reach. Grandson at bedside

## 2025-01-15 NOTE — ED NOTES
Patient resting in bed. Respirations easy and unlabored. No distress noted. Call light within reach. Ambulated to bathroom

## 2025-01-16 ENCOUNTER — APPOINTMENT (OUTPATIENT)
Dept: CT IMAGING | Age: 75
End: 2025-01-16
Payer: COMMERCIAL

## 2025-01-16 ENCOUNTER — HOSPITAL ENCOUNTER (EMERGENCY)
Age: 75
Discharge: HOME | End: 2025-01-16
Attending: EMERGENCY MEDICINE
Payer: COMMERCIAL

## 2025-01-16 VITALS
BODY MASS INDEX: 25.55 KG/M2 | TEMPERATURE: 98.3 F | OXYGEN SATURATION: 98 % | HEART RATE: 69 BPM | SYSTOLIC BLOOD PRESSURE: 128 MMHG | RESPIRATION RATE: 18 BRPM | WEIGHT: 173 LBS | DIASTOLIC BLOOD PRESSURE: 102 MMHG

## 2025-01-16 DIAGNOSIS — R06.00 DYSPNEA, UNSPECIFIED TYPE: Primary | ICD-10-CM

## 2025-01-16 LAB
ALBUMIN SERPL BCG-MCNC: 3.3 G/DL (ref 3.5–5.1)
ALP SERPL-CCNC: 77 U/L (ref 38–126)
ALT SERPL W/O P-5'-P-CCNC: 7 U/L (ref 11–66)
ANION GAP SERPL CALC-SCNC: 10 MEQ/L (ref 8–16)
AST SERPL-CCNC: 16 U/L (ref 5–40)
BACTERIA UR CULT: ABNORMAL
BASOPHILS ABSOLUTE: 0.1 THOU/MM3 (ref 0–0.1)
BASOPHILS NFR BLD AUTO: 0.6 %
BILIRUB SERPL-MCNC: 0.4 MG/DL (ref 0.3–1.2)
BILIRUB UR QL STRIP.AUTO: NEGATIVE
BUN SERPL-MCNC: 17 MG/DL (ref 7–22)
CALCIUM SERPL-MCNC: 9.1 MG/DL (ref 8.5–10.5)
CHARACTER UR: CLEAR
CHLORIDE SERPL-SCNC: 103 MEQ/L (ref 98–111)
CO2 SERPL-SCNC: 26 MEQ/L (ref 23–33)
COLOR, UA: YELLOW
CREAT SERPL-MCNC: 1.1 MG/DL (ref 0.4–1.2)
CRP SERPL-MCNC: 3.55 MG/DL (ref 0–1)
DEPRECATED RDW RBC AUTO: 42.5 FL (ref 35–45)
EKG ATRIAL RATE: 72 BPM
EKG P AXIS: -8 DEGREES
EKG P-R INTERVAL: 190 MS
EKG Q-T INTERVAL: 392 MS
EKG QRS DURATION: 84 MS
EKG QTC CALCULATION (BAZETT): 429 MS
EKG R AXIS: -12 DEGREES
EKG T AXIS: 48 DEGREES
EKG VENTRICULAR RATE: 72 BPM
EOSINOPHIL NFR BLD AUTO: 2.1 %
EOSINOPHILS ABSOLUTE: 0.2 THOU/MM3 (ref 0–0.4)
ERYTHROCYTE [DISTWIDTH] IN BLOOD BY AUTOMATED COUNT: 12.5 % (ref 11.5–14.5)
FLUAV RNA RESP QL NAA+PROBE: NOT DETECTED
FLUBV RNA RESP QL NAA+PROBE: NOT DETECTED
GFR SERPL CREATININE-BSD FRML MDRD: 70 ML/MIN/1.73M2
GLUCOSE SERPL-MCNC: 103 MG/DL (ref 70–108)
GLUCOSE UR QL STRIP.AUTO: NEGATIVE MG/DL
HCT VFR BLD AUTO: 39.9 % (ref 42–52)
HGB BLD-MCNC: 12.8 GM/DL (ref 14–18)
HGB UR QL STRIP.AUTO: NEGATIVE
IMM GRANULOCYTES # BLD AUTO: 0.04 THOU/MM3 (ref 0–0.07)
IMM GRANULOCYTES NFR BLD AUTO: 0.4 %
KETONES UR QL STRIP.AUTO: NEGATIVE
LIPASE SERPL-CCNC: 41.7 U/L (ref 5.6–51.3)
LYMPHOCYTES ABSOLUTE: 2 THOU/MM3 (ref 1–4.8)
LYMPHOCYTES NFR BLD AUTO: 22.9 %
MCH RBC QN AUTO: 29.8 PG (ref 26–33)
MCHC RBC AUTO-ENTMCNC: 32.1 GM/DL (ref 32.2–35.5)
MCV RBC AUTO: 92.8 FL (ref 80–94)
MONOCYTES ABSOLUTE: 1 THOU/MM3 (ref 0.4–1.3)
MONOCYTES NFR BLD AUTO: 10.8 %
NEUTROPHILS ABSOLUTE: 5.6 THOU/MM3 (ref 1.8–7.7)
NEUTROPHILS NFR BLD AUTO: 63.2 %
NITRITE UR QL STRIP: NEGATIVE
NRBC BLD AUTO-RTO: 0 /100 WBC
NT-PROBNP SERPL IA-MCNC: 208.7 PG/ML (ref 0–449)
ORGANISM: ABNORMAL
OSMOLALITY SERPL CALC.SUM OF ELEC: 279.3 MOSMOL/KG (ref 275–300)
PH UR STRIP.AUTO: 8 [PH] (ref 5–9)
PLATELET # BLD AUTO: 244 THOU/MM3 (ref 130–400)
PMV BLD AUTO: 11.6 FL (ref 9.4–12.4)
POTASSIUM SERPL-SCNC: 4.6 MEQ/L (ref 3.5–5.2)
PROT SERPL-MCNC: 6.3 G/DL (ref 6.1–8)
PROT UR STRIP.AUTO-MCNC: NEGATIVE MG/DL
RBC # BLD AUTO: 4.3 MILL/MM3 (ref 4.7–6.1)
SARS-COV-2 RNA RESP QL NAA+PROBE: NOT DETECTED
SODIUM SERPL-SCNC: 139 MEQ/L (ref 135–145)
SP GR UR REFRACT.AUTO: 1.01 (ref 1–1.03)
TROPONIN, HIGH SENSITIVITY: 29 NG/L (ref 0–12)
TROPONIN, HIGH SENSITIVITY: 29 NG/L (ref 0–12)
UROBILINOGEN, URINE: 1 EU/DL (ref 0–1)
WBC # BLD AUTO: 8.9 THOU/MM3 (ref 4.8–10.8)
WBC #/AREA URNS HPF: NEGATIVE /[HPF]

## 2025-01-16 PROCEDURE — 86140 C-REACTIVE PROTEIN: CPT

## 2025-01-16 PROCEDURE — 87636 SARSCOV2 & INF A&B AMP PRB: CPT

## 2025-01-16 PROCEDURE — 99284 EMERGENCY DEPT VISIT MOD MDM: CPT

## 2025-01-16 PROCEDURE — 93010 ELECTROCARDIOGRAM REPORT: CPT | Performed by: INTERNAL MEDICINE

## 2025-01-16 PROCEDURE — 84484 ASSAY OF TROPONIN QUANT: CPT

## 2025-01-16 PROCEDURE — 36415 COLL VENOUS BLD VENIPUNCTURE: CPT

## 2025-01-16 PROCEDURE — 6370000000 HC RX 637 (ALT 250 FOR IP): Performed by: EMERGENCY MEDICINE

## 2025-01-16 PROCEDURE — 94640 AIRWAY INHALATION TREATMENT: CPT

## 2025-01-16 PROCEDURE — 80053 COMPREHEN METABOLIC PANEL: CPT

## 2025-01-16 PROCEDURE — 83690 ASSAY OF LIPASE: CPT

## 2025-01-16 PROCEDURE — 83880 ASSAY OF NATRIURETIC PEPTIDE: CPT

## 2025-01-16 PROCEDURE — 85025 COMPLETE CBC W/AUTO DIFF WBC: CPT

## 2025-01-16 PROCEDURE — 93005 ELECTROCARDIOGRAM TRACING: CPT

## 2025-01-16 PROCEDURE — 81003 URINALYSIS AUTO W/O SCOPE: CPT

## 2025-01-16 PROCEDURE — 71250 CT THORAX DX C-: CPT

## 2025-01-16 RX ORDER — IPRATROPIUM BROMIDE AND ALBUTEROL SULFATE 2.5; .5 MG/3ML; MG/3ML
1 SOLUTION RESPIRATORY (INHALATION) ONCE
Status: COMPLETED | OUTPATIENT
Start: 2025-01-16 | End: 2025-01-16

## 2025-01-16 RX ADMIN — IPRATROPIUM BROMIDE AND ALBUTEROL SULFATE 1 DOSE: .5; 3 SOLUTION RESPIRATORY (INHALATION) at 08:11

## 2025-01-16 ASSESSMENT — PAIN DESCRIPTION - LOCATION: LOCATION: CHEST

## 2025-01-16 ASSESSMENT — PAIN SCALES - GENERAL
PAINLEVEL_OUTOF10: 5

## 2025-01-16 ASSESSMENT — PAIN - FUNCTIONAL ASSESSMENT
PAIN_FUNCTIONAL_ASSESSMENT: 0-10

## 2025-01-16 NOTE — DISCHARGE INSTRUCTIONS
Take your medication as indicated and prescribed.  Continue with your breathing treatment as prescribed, including Mucomyst, albuterol, nebulizer.  You are encouraged to ambulate frequently to assist with mucus movements.  Follow-up outpatient with ENT and your primary care doctor as discussed.    If you are given an antibiotic then, make sure you get the prescription filled and take the antibiotics until finished.  Drink plenty of water while taking the antibiotics.  Avoid drinking alcohol or drinks that have caffeine in it while taking antibiotics.       For pain use acetaminophen (Tylenol) or ibuprofen (Motrin / Advil), unless prescribed medications that have acetaminophen or ibuprofen (or similar medications) in it.  You can take over the counter acetaminophen tablets (1 - 2 tablets of the 500-mg strength every 6 hours) or ibuprofen tablets (2 tablets every 4 hours).    PLEASE RETURN TO THE EMERGENCY DEPARTMENT IMMEDIATELY for worsening symptoms of shortness of breath, wheezing, change in the amount of sputum that you cough up or a change in the color of your sputum, using your inhaler more frequently or if your inhaler only lasts up to 2 hours, or if you develop any concerning symptoms such as: high fever not relieved by acetaminophen (Tylenol) and/or ibuprofen (Motrin / Advil), chills, shortness of breath, chest pain, feeling of your heart fluttering or racing, persistent nausea and/or vomiting, vomiting up blood, blood in your stool, loss of consciousness, numbness, weakness or tingling in the arms or legs or change in color of the extremities, changes in mental status, persistent headache, blurry vision, loss of bladder / bowel control, unable to follow up with your physician, or other any other care or concern.

## 2025-01-16 NOTE — ED TRIAGE NOTES
Pt to ED via ems with c/o SOB. Pt states this has been going on since yesterday. Pt was seen in ED yesterday for same complaint. Pt states he did not  2/3 prescribed medications due to being out of stock. Pt unaware of what medication he picked up. Pt states chest pain 5/10 midsternum. Pt has cough and been coughing up mucus. Pt has audible wheezing. Telemetry in place. EKG completed. Lab work sent.

## 2025-01-16 NOTE — ED NOTES
Discharge instructions  discussed and explained with Pt. Pt. Verbalized understanding and has no further questions or needs at this time.

## 2025-01-16 NOTE — ED NOTES
Pt. Resting in bed with even and unlabored respirations. Pt. States pain is a 5/10 at this time. Pt states he is not  feeling better after breathing tx. Pt. Updated about plan of care and treatment. Family at bedside. Pt. Has no further concerns, questions or needs at this time. Call light within reach.

## 2025-01-16 NOTE — ED PROVIDER NOTES
[facility] EMERGENCY DEPARTMENT  EMERGENCY DEPARTMENT ENCOUNTER          Pt Name: Vladimir Muñiz  MRN: [id number]  Birthdate 1950  Date of evaluation: 1/16/2025  Physician: Navdeep Talamantes MD  Supervising Attending Physician: Jules Hussein DO       CHIEF COMPLAINT       Chief Complaint   Patient presents with    Shortness of Breath         HISTORY OF PRESENT ILLNESS    HPI  Vladimir Muñiz is a 74 y.o. male who presents to the emergency department from home for evaluation of dyspnea.  Patient recently had a bronchoscopy for tracheobronchomalacia and had a stent inserted in his trachea.   Patient states he has been having shortness of breath that has been increasing progressively after the surgery.  Patient also states he has an epigastric pain that is 5/10 and nonradiating.  Patient denies any other symptoms including fever, chills, nausea, vomiting.    The patient has no other acute complaints at this time.      PAST MEDICAL AND SURGICAL HISTORY     Past Medical History:   Diagnosis Date    Arrhythmia     CAD (coronary artery disease)     stents placed    Dizziness     Ganglion cyst     GERD (gastroesophageal reflux disease) 04/15/2014    Headache 01/15/2014    Hyperlipidemia     Hypertension     Major depression 12/15/2014    Osteoarthritis     Restless legs syndrome     RLS (restless legs syndrome)     SOB (shortness of breath)     Urinary retention 1/7/2025     Past Surgical History:   Procedure Laterality Date    BACK SURGERY      BRONCHOSCOPY N/A 1/6/2025    Bronchoscopy with Tracheal Stent Placement performed by Gustavo Mcnulty DO at [facility] OR    CHOLECYSTECTOMY, LAPAROSCOPIC      CHOLECYSTECTOMY, LAPAROSCOPIC      LARYNGOSCOPY N/A 1/6/2025    LARYNGOSCOPY/TRACHEOSCOPY performed by Jeremy Meyers MD at [facility] OR    LUNG REMOVAL, TOTAL N/A     NECK SURGERY      TESTICLE SURGERY           MEDICATIONS   No current facility-administered medications for this encounter.    Current 
Monocytes Absolute 1.0 0.4 - 1.3 thou/mm3    Eosinophils Absolute 0.2 0.0 - 0.4 thou/mm3    Basophils Absolute 0.1 0.0 - 0.1 thou/mm3    Immature Grans (Abs) 0.04 0.00 - 0.07 thou/mm3    nRBC 0 /100 wbc   Lipase   Result Value Ref Range    Lipase 41.7 5.6 - 51.3 U/L   Troponin   Result Value Ref Range    Troponin, High Sensitivity 29 (H) 0 - 12 ng/L   Urinalysis with Reflex to Culture    Specimen: Urine   Result Value Ref Range    Glucose, Ur NEGATIVE NEGATIVE mg/dl    Bilirubin, Urine NEGATIVE NEGATIVE    Ketones, Urine NEGATIVE NEGATIVE    Specific Gravity, UA 1.010 1.002 - 1.030    Blood, Urine NEGATIVE NEGATIVE    pH, Urine 8.0 5.0 - 9.0    Protein, UA NEGATIVE NEGATIVE    Urobilinogen, Urine 1.0 0.0 - 1.0 eu/dl    Nitrite, Urine NEGATIVE NEGATIVE    Leukocyte Esterase, Urine NEGATIVE NEGATIVE    Color, UA YELLOW STRAW-YELLOW    Character, Urine CLEAR CLEAR-SL CLOUD   Brain Natriuretic Peptide   Result Value Ref Range    NT Pro-.7 0.0 - 449.0 pg/mL   C-Reactive Protein   Result Value Ref Range    CRP 3.55 (H) 0.00 - 1.00 mg/dl   Anion Gap   Result Value Ref Range    Anion Gap 10.0 8.0 - 16.0 meq/L   Osmolality   Result Value Ref Range    Osmolality Calc 279.3 275.0 - 300.0 mOsmol/kg   Glomerular Filtration Rate, Estimated   Result Value Ref Range    Est, Glom Filt Rate 70 >60 ml/min/1.73m2   EKG Emergency   Result Value Ref Range    Ventricular Rate 72 BPM    Atrial Rate 72 BPM    P-R Interval 190 ms    QRS Duration 84 ms    Q-T Interval 392 ms    QTc Calculation (Bazett) 429 ms    P Axis -8 degrees    R Axis -12 degrees    T Axis 48 degrees       RADIOLOGY:  CT CHEST WO CONTRAST   Final Result   1. Tracheobronchial stents place.   2. Perihilar bronchial wall thickening, can be seen with small airways    disease.   3. Additional findings as described.      This document has been electronically signed by: Saji Estrada MD on    01/16/2025 06:37 AM      All CTs at this facility use dose modulation

## 2025-01-20 ENCOUNTER — OFFICE VISIT (OUTPATIENT)
Dept: ENT CLINIC | Age: 75
End: 2025-01-20
Payer: COMMERCIAL

## 2025-01-20 VITALS
DIASTOLIC BLOOD PRESSURE: 84 MMHG | HEART RATE: 74 BPM | OXYGEN SATURATION: 98 % | TEMPERATURE: 98.4 F | SYSTOLIC BLOOD PRESSURE: 142 MMHG | BODY MASS INDEX: 25.27 KG/M2 | HEIGHT: 69 IN | RESPIRATION RATE: 18 BRPM | WEIGHT: 170.6 LBS

## 2025-01-20 DIAGNOSIS — J39.8 TRACHEOBRONCHOMALACIA DETERMINED BY BRONCHOSCOPY: ICD-10-CM

## 2025-01-20 DIAGNOSIS — R06.09 DYSPNEA ON EXERTION: Primary | ICD-10-CM

## 2025-01-20 PROCEDURE — 1159F MED LIST DOCD IN RCRD: CPT | Performed by: OTOLARYNGOLOGY

## 2025-01-20 PROCEDURE — 1123F ACP DISCUSS/DSCN MKR DOCD: CPT | Performed by: OTOLARYNGOLOGY

## 2025-01-20 PROCEDURE — 3077F SYST BP >= 140 MM HG: CPT | Performed by: OTOLARYNGOLOGY

## 2025-01-20 PROCEDURE — 3079F DIAST BP 80-89 MM HG: CPT | Performed by: OTOLARYNGOLOGY

## 2025-01-20 PROCEDURE — 99213 OFFICE O/P EST LOW 20 MIN: CPT | Performed by: OTOLARYNGOLOGY

## 2025-01-21 NOTE — TELEPHONE ENCOUNTER
It appear ortega catheter was removed before discharged from hospital per pt request.  Please make sure he still follows up in next 2 weeks to ensure adequate emptying with PVR

## 2025-01-23 ENCOUNTER — TELEPHONE (OUTPATIENT)
Dept: ENT CLINIC | Age: 75
End: 2025-01-23

## 2025-01-23 NOTE — PROGRESS NOTES
[facility] PHYSICIANS [place] [facility]  [facility] EAR, NOSE AND THROAT  [address]  [address]  [facility] [address]  Dept: [phone]  Dept Fax: [phone]  Loc: [phone]    Vladimir Muñiz is a 74 y.o. male who was referred by No ref. provider found for:  Chief Complaint   Patient presents with    Post-Op Check     Patient is here post op tracheobronchomalacia.  Patient stated he is pain and very tired. He said he hasn't eaten very much. He is chocking on his mucus. He doesn't have any energy.        HPI:       History of Present Illness  Vladimir Muñiz is a 74 y.o. male who presents for evaluation of shortness of breath status post a combined operation with Dr. Freed to place a Y stent in his distal trachea and mainstem bronchi and see if his dyspnea is improved.    He reports a lack of improvement in his respiratory distress, with symptoms remaining consistent over the past month. He expresses concern about the potential impact of stent removal on his lung function. He acknowledges a slight improvement in his condition but finds it challenging to assess due to the cold weather. His mobility has improved, as evidenced by his ability to walk from his family room to the bathroom without needing to pause for rest, a feat he was previously unable to accomplish. He also notes an increase in his physical activity within the home compared to his prior state. He does not have an appointment scheduled with Dr. Alfaro.    The more I asked him about what he can do now compared to what he could do prior to the placement Y stent it became very apparent that he was dramatically improved but that he has not taken the time to notice the difference made by this recent intervention.        History:     Allergies   Allergen Reactions    Sildenafil Citrate Other (See Comments)     Chest pain, numbness    Cephalexin Other (See Comments)    Cetirizine & Related     Baycol [Cerivastatin Sodium] Other (See

## 2025-01-23 NOTE — TELEPHONE ENCOUNTER
Vladimir Muñiz called today to let Dr. Meyers know that he has decided to proceed with the next phase of his surgery.   I let him know that I would communicate this so orders could be placed and that our surgery scheduler would be in touch with him once she has all of the information needed.

## 2025-01-24 NOTE — TELEPHONE ENCOUNTER
This was your assessment from his visit on Monday 1/20/25. It looks like you want to see him back in 4 weeks to discuss.    Please advise.

## 2025-01-28 NOTE — TELEPHONE ENCOUNTER
I was able to speak to Vladimir. He said he has an appointment to have his stent removed on 3/3/25.  He is scheduled to see you Dr Meyers on 3/5/25. Do you want to see him that close to the stent removal?    Please advise.

## 2025-01-29 NOTE — TELEPHONE ENCOUNTER
See if we can move it 1 week later.  He will need a bronchoscopy.  When you speak to him tell him that I want him to exercise as much as he can the few days before he has his stent removed and the day after it has been removed.  That information will be very important for us to discuss on his return visit to me

## 2025-01-29 NOTE — TELEPHONE ENCOUNTER
Contacted Vladimir today to relay this information. However, he is thoroughly confused. He thought Dr Meyers was taking out the stent and thought Dr Mcnulty said he needed to remove the stent in 2 weeks but then said he didn't need to see him back.???    I contacted Dr Mcnamara office to find out if he is actually scheduled or what/when needs to happen. They are checking and will call me back.    Patient is currently scheduled with Dr Meyers on 3/5/25 but he (Dr DELGADILLO) wants to wait at least about 10 days after the stent removal for him to be seen here.

## 2025-02-04 ENCOUNTER — APPOINTMENT (OUTPATIENT)
Dept: GENERAL RADIOLOGY | Age: 75
End: 2025-02-04
Payer: COMMERCIAL

## 2025-02-04 ENCOUNTER — HOSPITAL ENCOUNTER (EMERGENCY)
Age: 75
Discharge: HOME | End: 2025-02-04
Payer: COMMERCIAL

## 2025-02-04 VITALS
SYSTOLIC BLOOD PRESSURE: 140 MMHG | WEIGHT: 168 LBS | BODY MASS INDEX: 24.81 KG/M2 | TEMPERATURE: 98.5 F | DIASTOLIC BLOOD PRESSURE: 55 MMHG | HEART RATE: 69 BPM | OXYGEN SATURATION: 96 % | RESPIRATION RATE: 18 BRPM

## 2025-02-04 DIAGNOSIS — J98.4 PNEUMONITIS: Primary | ICD-10-CM

## 2025-02-04 PROCEDURE — 99213 OFFICE O/P EST LOW 20 MIN: CPT

## 2025-02-04 PROCEDURE — 71046 X-RAY EXAM CHEST 2 VIEWS: CPT

## 2025-02-04 RX ORDER — PREDNISONE 20 MG/1
20 TABLET ORAL 2 TIMES DAILY
Qty: 10 TABLET | Refills: 0 | Status: SHIPPED | OUTPATIENT
Start: 2025-02-04 | End: 2025-02-09

## 2025-02-04 RX ORDER — GUAIFENESIN 600 MG/1
600 TABLET, EXTENDED RELEASE ORAL 2 TIMES DAILY
Qty: 30 TABLET | Refills: 0 | Status: ON HOLD | OUTPATIENT
Start: 2025-02-04 | End: 2025-02-19

## 2025-02-04 RX ORDER — DOXYCYCLINE HYCLATE 100 MG
100 TABLET ORAL 2 TIMES DAILY
Qty: 14 TABLET | Refills: 0 | Status: SHIPPED | OUTPATIENT
Start: 2025-02-04 | End: 2025-02-11

## 2025-02-04 ASSESSMENT — ENCOUNTER SYMPTOMS
SORE THROAT: 1
RHINORRHEA: 0
NAUSEA: 0
COUGH: 1
EYE REDNESS: 0
TROUBLE SWALLOWING: 0
DIARRHEA: 0
SHORTNESS OF BREATH: 1
EYE DISCHARGE: 0
VOMITING: 0

## 2025-02-04 ASSESSMENT — PAIN DESCRIPTION - FREQUENCY: FREQUENCY: CONTINUOUS

## 2025-02-04 ASSESSMENT — PAIN - FUNCTIONAL ASSESSMENT
PAIN_FUNCTIONAL_ASSESSMENT: PREVENTS OR INTERFERES SOME ACTIVE ACTIVITIES AND ADLS
PAIN_FUNCTIONAL_ASSESSMENT: 0-10

## 2025-02-04 ASSESSMENT — PAIN DESCRIPTION - LOCATION: LOCATION: CHEST

## 2025-02-04 ASSESSMENT — PAIN SCALES - GENERAL: PAINLEVEL_OUTOF10: 7

## 2025-02-04 NOTE — ED TRIAGE NOTES
Vladimir arrives to room with complaint of  chest congestion/heaviness, hard to breath, cough, sore throat  symptoms started yesterday.    Taking OTC cough meds, ibuprofen, last dose yesterday.

## 2025-02-04 NOTE — DISCHARGE INSTRUCTIONS
Prescription for prednisone, Mucinex, and Doxycyline.  Take medication until completed. Use over-the-counter Tylenol and Motrin for pain or fever.  Drink plenty of fluids. Follow-up with PCP in 3 to 5 days worsening symptom.  Seek emergency room if you develop severe shortness of breath.

## 2025-02-04 NOTE — ED PROVIDER NOTES
[facility] URGENT CARE  Urgent Care Encounter      CHIEF COMPLAINT       Chief Complaint   Patient presents with    Cough       Nurses Notes reviewed and I agree except as noted in the HPI.  HISTORY OF PRESENT ILLNESS   Vladimir Muñiz is a 74 y.o. male who presents urgent care for evaluation of cough.  Patient reports onset of symptoms yesterday.  Reports in addition to the cough, chest congestion heaviness reports it is hard to breathe at times and he has a sore throat.  Reports he has been taking over-the-counter cough medications, ibuprofen for his symptoms.  Last dose yesterday.    REVIEW OF SYSTEMS     Review of Systems   Constitutional:  Positive for fatigue. Negative for chills, diaphoresis and fever.   HENT:  Positive for congestion and sore throat. Negative for ear pain, rhinorrhea and trouble swallowing.    Eyes:  Negative for discharge and redness.   Respiratory:  Positive for cough and shortness of breath.    Cardiovascular:  Negative for chest pain.   Gastrointestinal:  Negative for diarrhea, nausea and vomiting.   Genitourinary:  Negative for decreased urine volume.   Musculoskeletal:  Positive for myalgias. Negative for neck pain and neck stiffness.   Skin:  Negative for rash.   Neurological:  Negative for headaches.   Hematological:  Negative for adenopathy.   Psychiatric/Behavioral:  Negative for sleep disturbance.        PAST MEDICAL HISTORY         Diagnosis Date    Arrhythmia     CAD (coronary artery disease)     stents placed    Dizziness     Ganglion cyst     GERD (gastroesophageal reflux disease) 04/15/2014    Headache 01/15/2014    Hyperlipidemia     Hypertension     Major depression 12/15/2014    Osteoarthritis     Restless legs syndrome     RLS (restless legs syndrome)     SOB (shortness of breath)     Urinary retention 1/7/2025       SURGICAL HISTORY     Patient  has a past surgical history that includes Testicle surgery; Cholecystectomy, laparoscopic; Cholecystectomy, laparoscopic; back

## 2025-02-06 NOTE — PROGRESS NOTES
MG tablet Take 1 tablet by mouth daily      isosorbide mononitrate (IMDUR) 60 MG extended release tablet Take 1 tablet by mouth every morning      calcium carbonate 600 MG TABS tablet Take 1 tablet by mouth daily      sotalol (BETAPACE) 80 MG tablet Take 1 tablet by mouth 2 times daily      pravastatin (PRAVACHOL) 40 MG tablet TAKE ONE TABLET BY MOUTH ONCE DAILY 30 tablet 5    melatonin 3 MG TABS tablet Take 1 tablet by mouth daily 30 tablet 3    ezetimibe (ZETIA) 10 MG tablet Take 1 tablet by mouth daily      benazepril (LOTENSIN) 40 MG tablet Take 1 tablet by mouth daily 30 tablet 5    amLODIPine (NORVASC) 5 MG tablet TAKE ONE TABLET BY MOUTH ONCE DAILY 90 tablet 0    fish oil-omega-3 fatty acids 1000 MG capsule Take 1 capsule by mouth 2 times daily      therapeutic multivitamin-minerals (THERAGRAN-M) tablet Take 1 tablet by mouth daily       No current facility-administered medications for this visit.       Past Medical History  Vladimir  has a past medical history of Arrhythmia, CAD (coronary artery disease), Dizziness, Ganglion cyst, GERD (gastroesophageal reflux disease), Headache, Hyperlipidemia, Hypertension, Major depression, Osteoarthritis, Restless legs syndrome, RLS (restless legs syndrome), SOB (shortness of breath), and Urinary retention.    Past Surgical History  The patient  has a past surgical history that includes Testicle surgery; Cholecystectomy, laparoscopic; Cholecystectomy, laparoscopic; back surgery; Neck surgery; Lung removal, total (N/A); laryngoscopy (N/A, 01/06/2025); and bronchoscopy (N/A, 01/06/2025).    Family History  This patient's family history includes Cancer in his brother and sister; Heart Disease in his father and mother; High Blood Pressure in his father and mother.    Social History  Vladimir  reports that he has quit smoking. His smoking use included cigarettes. He started smoking about 41 years ago. He has a 41.1 pack-year smoking history. He has been exposed to tobacco smoke. He

## 2025-02-07 ENCOUNTER — OFFICE VISIT (OUTPATIENT)
Dept: UROLOGY | Age: 75
End: 2025-02-07

## 2025-02-07 VITALS — RESPIRATION RATE: 18 BRPM | WEIGHT: 168 LBS | BODY MASS INDEX: 24.88 KG/M2 | HEIGHT: 69 IN

## 2025-02-07 DIAGNOSIS — N40.1 BENIGN PROSTATIC HYPERPLASIA WITH LOWER URINARY TRACT SYMPTOMS, SYMPTOM DETAILS UNSPECIFIED: ICD-10-CM

## 2025-02-07 DIAGNOSIS — R33.9 URINARY RETENTION: ICD-10-CM

## 2025-02-07 DIAGNOSIS — R30.0 DYSURIA: Primary | ICD-10-CM

## 2025-02-07 DIAGNOSIS — K59.00 CONSTIPATION, UNSPECIFIED CONSTIPATION TYPE: ICD-10-CM

## 2025-02-07 LAB
BILIRUBIN, URINE: NEGATIVE
BILIRUBIN, URINE: NEGATIVE
BLOOD URINE, POC: ABNORMAL
BLOOD URINE, POC: NEGATIVE
CHARACTER, URINE: CLEAR
CHARACTER, URINE: CLEAR
COLOR, UA: YELLOW
COLOR, UA: YELLOW
GLUCOSE URINE: NEGATIVE MG/DL
GLUCOSE URINE: NEGATIVE MG/DL
KETONES, URINE: ABNORMAL
KETONES, URINE: NEGATIVE
LEUKOCYTE CLUMPS, URINE: ABNORMAL
LEUKOCYTE CLUMPS, URINE: ABNORMAL
NITRITE, URINE: NEGATIVE
NITRITE, URINE: NEGATIVE
PH, URINE: 6 (ref 5–9)
PH, URINE: 6.5 (ref 5–9)
POST VOID RESIDUAL (PVR): 211 ML
PROTEIN, URINE: 30 MG/DL
PROTEIN, URINE: NEGATIVE MG/DL
SPECIFIC GRAVITY UA: 1.01 (ref 1–1.03)
SPECIFIC GRAVITY UA: 1.02 (ref 1–1.03)
UROBILINOGEN, URINE: 0.2 EU/DL (ref 0–1)
UROBILINOGEN, URINE: 0.2 EU/DL (ref 0–1)

## 2025-02-07 RX ORDER — TAMSULOSIN HYDROCHLORIDE 0.4 MG/1
0.4 CAPSULE ORAL 2 TIMES DAILY
Qty: 180 CAPSULE | Refills: 0 | Status: SHIPPED | OUTPATIENT
Start: 2025-02-07

## 2025-02-07 ASSESSMENT — ENCOUNTER SYMPTOMS
ABDOMINAL PAIN: 0
SHORTNESS OF BREATH: 0
COUGH: 0

## 2025-02-07 NOTE — PATIENT INSTRUCTIONS
Add metamucil or citrucel 1 tablespoon daily, I suspect constipation with overflow diarrhea. Constipation affects ability to empty bladder.     Increase flomax to 0.4mg twice  a day     Consider cystoscopy

## 2025-02-15 ENCOUNTER — HOSPITAL ENCOUNTER (INPATIENT)
Age: 75
LOS: 5 days | Discharge: HOME HEALTH | End: 2025-02-21
Attending: EMERGENCY MEDICINE | Admitting: STUDENT IN AN ORGANIZED HEALTH CARE EDUCATION/TRAINING PROGRAM
Payer: COMMERCIAL

## 2025-02-15 ENCOUNTER — APPOINTMENT (OUTPATIENT)
Dept: GENERAL RADIOLOGY | Age: 75
End: 2025-02-15
Payer: COMMERCIAL

## 2025-02-15 ENCOUNTER — APPOINTMENT (OUTPATIENT)
Dept: CT IMAGING | Age: 75
End: 2025-02-15
Payer: COMMERCIAL

## 2025-02-15 DIAGNOSIS — R79.89 ELEVATED TROPONIN: ICD-10-CM

## 2025-02-15 DIAGNOSIS — R07.9 CHEST PAIN, UNSPECIFIED TYPE: ICD-10-CM

## 2025-02-15 DIAGNOSIS — J18.9 PNEUMONIA OF BOTH LOWER LOBES DUE TO INFECTIOUS ORGANISM: Primary | ICD-10-CM

## 2025-02-15 DIAGNOSIS — Z20.822 LAB TEST NEGATIVE FOR COVID-19 VIRUS: ICD-10-CM

## 2025-02-15 DIAGNOSIS — E44.0 MODERATE MALNUTRITION: Chronic | ICD-10-CM

## 2025-02-15 DIAGNOSIS — E87.29 HIGH ANION GAP METABOLIC ACIDOSIS: ICD-10-CM

## 2025-02-15 DIAGNOSIS — J39.8 TRACHEOBRONCHOMALACIA: ICD-10-CM

## 2025-02-15 DIAGNOSIS — I25.118 CORONARY ARTERY DISEASE OF NATIVE HEART WITH STABLE ANGINA PECTORIS, UNSPECIFIED VESSEL OR LESION TYPE: ICD-10-CM

## 2025-02-15 LAB
ANION GAP SERPL CALC-SCNC: 17 MEQ/L (ref 8–16)
BASOPHILS ABSOLUTE: 0 THOU/MM3 (ref 0–0.1)
BASOPHILS NFR BLD AUTO: 0.2 %
BUN SERPL-MCNC: 20 MG/DL (ref 7–22)
CALCIUM SERPL-MCNC: 9.6 MG/DL (ref 8.5–10.5)
CHLORIDE SERPL-SCNC: 105 MEQ/L (ref 98–111)
CO2 SERPL-SCNC: 22 MEQ/L (ref 23–33)
CREAT SERPL-MCNC: 1.1 MG/DL (ref 0.4–1.2)
DEPRECATED RDW RBC AUTO: 45.1 FL (ref 35–45)
EOSINOPHIL NFR BLD AUTO: 1.8 %
EOSINOPHILS ABSOLUTE: 0.2 THOU/MM3 (ref 0–0.4)
ERYTHROCYTE [DISTWIDTH] IN BLOOD BY AUTOMATED COUNT: 13.2 % (ref 11.5–14.5)
FLUAV AG SPEC QL: NEGATIVE
FLUBV AG SPEC QL: NEGATIVE
GFR SERPL CREATININE-BSD FRML MDRD: 70 ML/MIN/1.73M2
GLUCOSE SERPL-MCNC: 102 MG/DL (ref 70–108)
HCT VFR BLD AUTO: 38.4 % (ref 42–52)
HGB BLD-MCNC: 12.1 GM/DL (ref 14–18)
IMM GRANULOCYTES # BLD AUTO: 0.05 THOU/MM3 (ref 0–0.07)
IMM GRANULOCYTES NFR BLD AUTO: 0.4 %
LYMPHOCYTES ABSOLUTE: 2 THOU/MM3 (ref 1–4.8)
LYMPHOCYTES NFR BLD AUTO: 16.8 %
MCH RBC QN AUTO: 29.4 PG (ref 26–33)
MCHC RBC AUTO-ENTMCNC: 31.5 GM/DL (ref 32.2–35.5)
MCV RBC AUTO: 93.2 FL (ref 80–94)
MONOCYTES ABSOLUTE: 1.1 THOU/MM3 (ref 0.4–1.3)
MONOCYTES NFR BLD AUTO: 9.3 %
NEUTROPHILS ABSOLUTE: 8.7 THOU/MM3 (ref 1.8–7.7)
NEUTROPHILS NFR BLD AUTO: 71.5 %
NRBC BLD AUTO-RTO: 0 /100 WBC
OSMOLALITY SERPL CALC.SUM OF ELEC: 289.6 MOSMOL/KG (ref 275–300)
PLATELET # BLD AUTO: 190 THOU/MM3 (ref 130–400)
PMV BLD AUTO: 11.8 FL (ref 9.4–12.4)
POTASSIUM SERPL-SCNC: 4.6 MEQ/L (ref 3.5–5.2)
RBC # BLD AUTO: 4.12 MILL/MM3 (ref 4.7–6.1)
SARS-COV-2 RDRP RESP QL NAA+PROBE: NOT  DETECTED
SODIUM SERPL-SCNC: 144 MEQ/L (ref 135–145)
TROPONIN, HIGH SENSITIVITY: 36 NG/L (ref 0–12)
WBC # BLD AUTO: 12.2 THOU/MM3 (ref 4.8–10.8)

## 2025-02-15 PROCEDURE — 87804 INFLUENZA ASSAY W/OPTIC: CPT

## 2025-02-15 PROCEDURE — 83605 ASSAY OF LACTIC ACID: CPT

## 2025-02-15 PROCEDURE — 87635 SARS-COV-2 COVID-19 AMP PRB: CPT

## 2025-02-15 PROCEDURE — 6370000000 HC RX 637 (ALT 250 FOR IP)

## 2025-02-15 PROCEDURE — 80053 COMPREHEN METABOLIC PANEL: CPT

## 2025-02-15 PROCEDURE — 36415 COLL VENOUS BLD VENIPUNCTURE: CPT

## 2025-02-15 PROCEDURE — 99215 OFFICE O/P EST HI 40 MIN: CPT

## 2025-02-15 PROCEDURE — 71046 X-RAY EXAM CHEST 2 VIEWS: CPT

## 2025-02-15 PROCEDURE — 82248 BILIRUBIN DIRECT: CPT

## 2025-02-15 PROCEDURE — 71250 CT THORAX DX C-: CPT

## 2025-02-15 PROCEDURE — 93005 ELECTROCARDIOGRAM TRACING: CPT | Performed by: EMERGENCY MEDICINE

## 2025-02-15 PROCEDURE — 85025 COMPLETE CBC W/AUTO DIFF WBC: CPT

## 2025-02-15 PROCEDURE — 99285 EMERGENCY DEPT VISIT HI MDM: CPT

## 2025-02-15 PROCEDURE — 99215 OFFICE O/P EST HI 40 MIN: CPT | Performed by: NURSE PRACTITIONER

## 2025-02-15 PROCEDURE — 84484 ASSAY OF TROPONIN QUANT: CPT

## 2025-02-15 RX ADMIN — LIDOCAINE HYDROCHLORIDE: 20 SOLUTION ORAL at 22:38

## 2025-02-15 ASSESSMENT — PAIN - FUNCTIONAL ASSESSMENT: PAIN_FUNCTIONAL_ASSESSMENT: NONE - DENIES PAIN

## 2025-02-15 NOTE — ED PROVIDER NOTES
[facility] URGENT CARE  UrgentCare Encounter      CHIEFCOMPLAINT       Chief Complaint   Patient presents with    Cough     Onset  5 or 6 weeks  not getting any better. Has a  procedure scheduled  march 5th and wants to be well for it. Gets winded easily due to the constant coughing.       Nurses Notes reviewed and I agree except as noted in the HPI.  HISTORY OF PRESENT ILLNESS     Vladimir Muñiz is a 74 y.o. male who presents to the urgent care for evaluation.  The history is provided by the patient.   Cold Symptoms  Presenting symptoms: cough (Was seen here in the urgent care on 2/4/2025) and fatigue    Duration: started 5-6 weeks ago.  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  Stent is supposed to be removed in March 2025    The patient/patient representative has no other acute complaints at this time.    REVIEW OF SYSTEMS     Review of Systems   Constitutional:  Positive for fatigue.   Respiratory:  Positive for cough (Was seen here in the urgent care on 2/4/2025) and shortness of breath.    All other systems reviewed and are negative.      PAST MEDICAL HISTORY         Diagnosis Date    Arrhythmia     CAD (coronary artery disease)     stents placed    Dizziness     Ganglion cyst     GERD (gastroesophageal reflux disease) 04/15/2014    Headache 01/15/2014    Hyperlipidemia     Hypertension     Major depression 12/15/2014    Osteoarthritis     Restless legs syndrome     RLS (restless legs syndrome)     SOB (shortness of breath)     Urinary retention 1/7/2025       SURGICAL HISTORY     Patient  has a past surgical history that includes Testicle surgery; Cholecystectomy, laparoscopic; Cholecystectomy, laparoscopic; back surgery; Neck surgery; Lung removal, total (N/A); laryngoscopy (N/A, 01/06/2025); and bronchoscopy (N/A, 01/06/2025).    CURRENT MEDICATIONS       Previous Medications    ACETYLCYSTEINE  evalation, do not eat or drink prior to arrival      Tessa Arauz, APRN - CNP    Please note that some or all of this chart was generated using [employer] voice recognition software. Although every effort was made to ensure the accuracy of this automated transcription, some errors in transcription may have occurred.         Tessa Arauz, APRN - CNP  02/15/25 192

## 2025-02-16 PROBLEM — N31.9 NEUROGENIC BLADDER: Status: ACTIVE | Noted: 2025-02-16

## 2025-02-16 PROBLEM — N40.0 BENIGN PROSTATIC HYPERPLASIA WITHOUT LOWER URINARY TRACT SYMPTOMS: Status: ACTIVE | Noted: 2025-02-16

## 2025-02-16 PROBLEM — E87.29 HIGH ANION GAP METABOLIC ACIDOSIS: Status: ACTIVE | Noted: 2025-02-16

## 2025-02-16 PROBLEM — R05.8 DRY COUGH: Status: ACTIVE | Noted: 2025-02-16

## 2025-02-16 PROBLEM — G47.33 OSA (OBSTRUCTIVE SLEEP APNEA): Status: ACTIVE | Noted: 2025-02-16

## 2025-02-16 PROBLEM — F41.9 ANXIETY: Status: ACTIVE | Noted: 2025-02-16

## 2025-02-16 PROBLEM — F32.2 SEVERE DEPRESSION (HCC): Status: ACTIVE | Noted: 2025-02-16

## 2025-02-16 PROBLEM — J18.9 COMMUNITY ACQUIRED PNEUMONIA OF LEFT LOWER LOBE OF LUNG: Status: ACTIVE | Noted: 2025-02-16

## 2025-02-16 LAB
ALBUMIN SERPL BCG-MCNC: 3.5 G/DL (ref 3.5–5.1)
ALP SERPL-CCNC: 72 U/L (ref 38–126)
ALT SERPL W/O P-5'-P-CCNC: 8 U/L (ref 11–66)
ANION GAP SERPL CALC-SCNC: 14 MEQ/L (ref 8–16)
AST SERPL-CCNC: 12 U/L (ref 5–40)
B-OH-BUTYR SERPL-MSCNC: 7.44 MG/DL (ref 0.2–2.81)
BILIRUB CONJ SERPL-MCNC: 0.2 MG/DL (ref 0–0.3)
BILIRUB SERPL-MCNC: 0.4 MG/DL (ref 0.3–1.2)
BUN SERPL-MCNC: 17 MG/DL (ref 7–22)
CALCIUM SERPL-MCNC: 8.8 MG/DL (ref 8.5–10.5)
CHLORIDE SERPL-SCNC: 103 MEQ/L (ref 98–111)
CO2 SERPL-SCNC: 22 MEQ/L (ref 23–33)
CREAT SERPL-MCNC: 0.8 MG/DL (ref 0.4–1.2)
DEPRECATED RDW RBC AUTO: 45.1 FL (ref 35–45)
EKG ATRIAL RATE: 76 BPM
EKG ATRIAL RATE: 79 BPM
EKG P AXIS: -14 DEGREES
EKG P AXIS: 12 DEGREES
EKG P-R INTERVAL: 168 MS
EKG P-R INTERVAL: 172 MS
EKG Q-T INTERVAL: 416 MS
EKG Q-T INTERVAL: 418 MS
EKG QRS DURATION: 124 MS
EKG QRS DURATION: 128 MS
EKG QTC CALCULATION (BAZETT): 468 MS
EKG QTC CALCULATION (BAZETT): 479 MS
EKG R AXIS: -25 DEGREES
EKG R AXIS: -28 DEGREES
EKG T AXIS: 18 DEGREES
EKG T AXIS: 34 DEGREES
EKG VENTRICULAR RATE: 76 BPM
EKG VENTRICULAR RATE: 79 BPM
ERYTHROCYTE [DISTWIDTH] IN BLOOD BY AUTOMATED COUNT: 13.2 % (ref 11.5–14.5)
GFR SERPL CREATININE-BSD FRML MDRD: > 90 ML/MIN/1.73M2
GLUCOSE SERPL-MCNC: 83 MG/DL (ref 70–108)
GRAM STN SPEC: NORMAL
HCT VFR BLD AUTO: 35.9 % (ref 42–52)
HGB BLD-MCNC: 11.5 GM/DL (ref 14–18)
LACTIC ACID, SEPSIS: 0.9 MMOL/L (ref 0.5–1.9)
LACTIC ACID, SEPSIS: 1 MMOL/L (ref 0.5–1.9)
MAGNESIUM SERPL-MCNC: 2.1 MG/DL (ref 1.6–2.4)
MCH RBC QN AUTO: 29.6 PG (ref 26–33)
MCHC RBC AUTO-ENTMCNC: 32 GM/DL (ref 32.2–35.5)
MCV RBC AUTO: 92.3 FL (ref 80–94)
MRSA DNA SPEC QL NAA+PROBE: NEGATIVE
OSMOLALITY SERPL CALC.SUM OF ELEC: 278.2 MOSMOL/KG (ref 275–300)
PHOSPHATE SERPL-MCNC: 2.5 MG/DL (ref 2.4–4.7)
PLATELET # BLD AUTO: 166 THOU/MM3 (ref 130–400)
PMV BLD AUTO: 11.4 FL (ref 9.4–12.4)
POTASSIUM SERPL-SCNC: 3.9 MEQ/L (ref 3.5–5.2)
PROCALCITONIN SERPL IA-MCNC: 0.14 NG/ML (ref 0.01–0.09)
PROT SERPL-MCNC: 6 G/DL (ref 6.1–8)
RBC # BLD AUTO: 3.89 MILL/MM3 (ref 4.7–6.1)
REASON FOR REJECTION: NORMAL
REJECTED TEST: NORMAL
SODIUM SERPL-SCNC: 139 MEQ/L (ref 135–145)
TROPONIN, HIGH SENSITIVITY: 29 NG/L (ref 0–12)
TROPONIN, HIGH SENSITIVITY: 29 NG/L (ref 0–12)
TROPONIN, HIGH SENSITIVITY: 31 NG/L (ref 0–12)
WBC # BLD AUTO: 9.9 THOU/MM3 (ref 4.8–10.8)

## 2025-02-16 PROCEDURE — 2060000000 HC ICU INTERMEDIATE R&B

## 2025-02-16 PROCEDURE — 93010 ELECTROCARDIOGRAM REPORT: CPT | Performed by: INTERNAL MEDICINE

## 2025-02-16 PROCEDURE — 80048 BASIC METABOLIC PNL TOTAL CA: CPT

## 2025-02-16 PROCEDURE — 2500000003 HC RX 250 WO HCPCS

## 2025-02-16 PROCEDURE — 94660 CPAP INITIATION&MGMT: CPT

## 2025-02-16 PROCEDURE — 84100 ASSAY OF PHOSPHORUS: CPT

## 2025-02-16 PROCEDURE — 87040 BLOOD CULTURE FOR BACTERIA: CPT

## 2025-02-16 PROCEDURE — 82010 KETONE BODYS QUAN: CPT

## 2025-02-16 PROCEDURE — 93005 ELECTROCARDIOGRAM TRACING: CPT | Performed by: STUDENT IN AN ORGANIZED HEALTH CARE EDUCATION/TRAINING PROGRAM

## 2025-02-16 PROCEDURE — 99223 1ST HOSP IP/OBS HIGH 75: CPT | Performed by: STUDENT IN AN ORGANIZED HEALTH CARE EDUCATION/TRAINING PROGRAM

## 2025-02-16 PROCEDURE — 87205 SMEAR GRAM STAIN: CPT

## 2025-02-16 PROCEDURE — 5A09457 ASSISTANCE WITH RESPIRATORY VENTILATION, 24-96 CONSECUTIVE HOURS, CONTINUOUS POSITIVE AIRWAY PRESSURE: ICD-10-PCS

## 2025-02-16 PROCEDURE — 94761 N-INVAS EAR/PLS OXIMETRY MLT: CPT

## 2025-02-16 PROCEDURE — 83735 ASSAY OF MAGNESIUM: CPT

## 2025-02-16 PROCEDURE — 2700000000 HC OXYGEN THERAPY PER DAY

## 2025-02-16 PROCEDURE — 2580000003 HC RX 258

## 2025-02-16 PROCEDURE — 36415 COLL VENOUS BLD VENIPUNCTURE: CPT

## 2025-02-16 PROCEDURE — 6370000000 HC RX 637 (ALT 250 FOR IP)

## 2025-02-16 PROCEDURE — 84484 ASSAY OF TROPONIN QUANT: CPT

## 2025-02-16 PROCEDURE — 85027 COMPLETE CBC AUTOMATED: CPT

## 2025-02-16 PROCEDURE — 84145 PROCALCITONIN (PCT): CPT

## 2025-02-16 PROCEDURE — 83605 ASSAY OF LACTIC ACID: CPT

## 2025-02-16 PROCEDURE — 96374 THER/PROPH/DIAG INJ IV PUSH: CPT

## 2025-02-16 PROCEDURE — 96375 TX/PRO/DX INJ NEW DRUG ADDON: CPT

## 2025-02-16 PROCEDURE — 6360000002 HC RX W HCPCS

## 2025-02-16 PROCEDURE — 87641 MR-STAPH DNA AMP PROBE: CPT

## 2025-02-16 PROCEDURE — 51798 US URINE CAPACITY MEASURE: CPT

## 2025-02-16 RX ORDER — SOTALOL HYDROCHLORIDE 80 MG/1
80 TABLET ORAL 2 TIMES DAILY
Status: DISCONTINUED | OUTPATIENT
Start: 2025-02-16 | End: 2025-02-20

## 2025-02-16 RX ORDER — BENZONATATE 100 MG/1
100 CAPSULE ORAL 3 TIMES DAILY PRN
Status: DISCONTINUED | OUTPATIENT
Start: 2025-02-16 | End: 2025-02-21 | Stop reason: HOSPADM

## 2025-02-16 RX ORDER — ASPIRIN 81 MG/1
81 TABLET ORAL DAILY
COMMUNITY

## 2025-02-16 RX ORDER — SODIUM CHLORIDE 0.9 % (FLUSH) 0.9 %
5-40 SYRINGE (ML) INJECTION EVERY 12 HOURS SCHEDULED
Status: DISCONTINUED | OUTPATIENT
Start: 2025-02-16 | End: 2025-02-21 | Stop reason: HOSPADM

## 2025-02-16 RX ORDER — ALBUTEROL SULFATE 0.83 MG/ML
2.5 SOLUTION RESPIRATORY (INHALATION) EVERY 6 HOURS PRN
Status: DISCONTINUED | OUTPATIENT
Start: 2025-02-16 | End: 2025-02-21 | Stop reason: HOSPADM

## 2025-02-16 RX ORDER — SODIUM CHLORIDE FOR INHALATION 0.9 %
3 VIAL, NEBULIZER (ML) INHALATION PRN
Status: DISCONTINUED | OUTPATIENT
Start: 2025-02-16 | End: 2025-02-21 | Stop reason: HOSPADM

## 2025-02-16 RX ORDER — SODIUM CHLORIDE FOR INHALATION 0.9 %
3 VIAL, NEBULIZER (ML) INHALATION 3 TIMES DAILY
Status: DISCONTINUED | OUTPATIENT
Start: 2025-02-16 | End: 2025-02-16

## 2025-02-16 RX ORDER — POLYETHYLENE GLYCOL 3350 17 G/17G
17 POWDER, FOR SOLUTION ORAL DAILY PRN
Status: DISCONTINUED | OUTPATIENT
Start: 2025-02-16 | End: 2025-02-21 | Stop reason: HOSPADM

## 2025-02-16 RX ORDER — PRAVASTATIN SODIUM 40 MG
40 TABLET ORAL NIGHTLY
Status: DISCONTINUED | OUTPATIENT
Start: 2025-02-16 | End: 2025-02-21 | Stop reason: HOSPADM

## 2025-02-16 RX ORDER — SODIUM CHLORIDE 0.9 % (FLUSH) 0.9 %
5-40 SYRINGE (ML) INJECTION PRN
Status: DISCONTINUED | OUTPATIENT
Start: 2025-02-16 | End: 2025-02-21 | Stop reason: HOSPADM

## 2025-02-16 RX ORDER — PANTOPRAZOLE SODIUM 40 MG/1
40 TABLET, DELAYED RELEASE ORAL
Status: DISCONTINUED | OUTPATIENT
Start: 2025-02-16 | End: 2025-02-21 | Stop reason: HOSPADM

## 2025-02-16 RX ORDER — MAGNESIUM SULFATE IN WATER 40 MG/ML
2000 INJECTION, SOLUTION INTRAVENOUS PRN
Status: DISCONTINUED | OUTPATIENT
Start: 2025-02-16 | End: 2025-02-21 | Stop reason: HOSPADM

## 2025-02-16 RX ORDER — ACETAMINOPHEN 325 MG/1
650 TABLET ORAL EVERY 6 HOURS PRN
Status: DISCONTINUED | OUTPATIENT
Start: 2025-02-16 | End: 2025-02-21 | Stop reason: HOSPADM

## 2025-02-16 RX ORDER — OXYBUTYNIN CHLORIDE 5 MG/1
5 TABLET, EXTENDED RELEASE ORAL DAILY
Status: DISCONTINUED | OUTPATIENT
Start: 2025-02-16 | End: 2025-02-21 | Stop reason: HOSPADM

## 2025-02-16 RX ORDER — ACETAMINOPHEN 650 MG/1
650 SUPPOSITORY RECTAL EVERY 6 HOURS PRN
Status: DISCONTINUED | OUTPATIENT
Start: 2025-02-16 | End: 2025-02-21 | Stop reason: HOSPADM

## 2025-02-16 RX ORDER — POTASSIUM CHLORIDE 1500 MG/1
40 TABLET, EXTENDED RELEASE ORAL PRN
Status: DISCONTINUED | OUTPATIENT
Start: 2025-02-16 | End: 2025-02-21 | Stop reason: HOSPADM

## 2025-02-16 RX ORDER — LABETALOL HYDROCHLORIDE 5 MG/ML
10 INJECTION, SOLUTION INTRAVENOUS EVERY 6 HOURS PRN
Status: DISCONTINUED | OUTPATIENT
Start: 2025-02-16 | End: 2025-02-21 | Stop reason: HOSPADM

## 2025-02-16 RX ORDER — POTASSIUM CHLORIDE 7.45 MG/ML
10 INJECTION INTRAVENOUS PRN
Status: DISCONTINUED | OUTPATIENT
Start: 2025-02-16 | End: 2025-02-21 | Stop reason: HOSPADM

## 2025-02-16 RX ORDER — PRASUGREL 10 MG/1
10 TABLET, FILM COATED ORAL ONCE
Status: DISCONTINUED | OUTPATIENT
Start: 2025-02-16 | End: 2025-02-16

## 2025-02-16 RX ORDER — SODIUM CHLORIDE 9 MG/ML
INJECTION, SOLUTION INTRAVENOUS PRN
Status: DISCONTINUED | OUTPATIENT
Start: 2025-02-16 | End: 2025-02-21 | Stop reason: HOSPADM

## 2025-02-16 RX ORDER — ISOSORBIDE MONONITRATE 60 MG/1
60 TABLET, EXTENDED RELEASE ORAL EVERY MORNING
Status: DISCONTINUED | OUTPATIENT
Start: 2025-02-16 | End: 2025-02-21 | Stop reason: HOSPADM

## 2025-02-16 RX ORDER — LISINOPRIL 40 MG/1
40 TABLET ORAL DAILY
Status: DISCONTINUED | OUTPATIENT
Start: 2025-02-16 | End: 2025-02-21 | Stop reason: HOSPADM

## 2025-02-16 RX ORDER — GABAPENTIN 400 MG/1
400 CAPSULE ORAL 3 TIMES DAILY
Status: DISCONTINUED | OUTPATIENT
Start: 2025-02-16 | End: 2025-02-21 | Stop reason: HOSPADM

## 2025-02-16 RX ORDER — MORPHINE SULFATE 2 MG/ML
1 INJECTION, SOLUTION INTRAMUSCULAR; INTRAVENOUS ONCE
Status: COMPLETED | OUTPATIENT
Start: 2025-02-16 | End: 2025-02-17

## 2025-02-16 RX ORDER — ENOXAPARIN SODIUM 100 MG/ML
40 INJECTION SUBCUTANEOUS DAILY
Status: DISCONTINUED | OUTPATIENT
Start: 2025-02-16 | End: 2025-02-21 | Stop reason: HOSPADM

## 2025-02-16 RX ORDER — TAMSULOSIN HYDROCHLORIDE 0.4 MG/1
0.4 CAPSULE ORAL 2 TIMES DAILY
Status: DISCONTINUED | OUTPATIENT
Start: 2025-02-16 | End: 2025-02-21 | Stop reason: HOSPADM

## 2025-02-16 RX ORDER — AMLODIPINE BESYLATE 5 MG/1
5 TABLET ORAL DAILY
Status: DISCONTINUED | OUTPATIENT
Start: 2025-02-16 | End: 2025-02-21 | Stop reason: HOSPADM

## 2025-02-16 RX ORDER — EZETIMIBE 10 MG/1
10 TABLET ORAL DAILY
Status: DISCONTINUED | OUTPATIENT
Start: 2025-02-16 | End: 2025-02-21 | Stop reason: HOSPADM

## 2025-02-16 RX ORDER — PRASUGREL 10 MG/1
5 TABLET, FILM COATED ORAL DAILY
Status: DISCONTINUED | OUTPATIENT
Start: 2025-02-16 | End: 2025-02-21 | Stop reason: HOSPADM

## 2025-02-16 RX ORDER — HYDROXYZINE HYDROCHLORIDE 10 MG/1
10 TABLET, FILM COATED ORAL NIGHTLY PRN
Status: DISCONTINUED | OUTPATIENT
Start: 2025-02-16 | End: 2025-02-21 | Stop reason: HOSPADM

## 2025-02-16 RX ORDER — SODIUM CHLORIDE FOR INHALATION 3 %
4 VIAL, NEBULIZER (ML) INHALATION PRN
Status: DISCONTINUED | OUTPATIENT
Start: 2025-02-16 | End: 2025-02-21 | Stop reason: HOSPADM

## 2025-02-16 RX ORDER — METOPROLOL TARTRATE 1 MG/ML
5 INJECTION, SOLUTION INTRAVENOUS EVERY 6 HOURS PRN
Status: DISCONTINUED | OUTPATIENT
Start: 2025-02-16 | End: 2025-02-16

## 2025-02-16 RX ADMIN — OXYBUTYNIN CHLORIDE 5 MG: 5 TABLET, EXTENDED RELEASE ORAL at 22:26

## 2025-02-16 RX ADMIN — SODIUM CHLORIDE, PRESERVATIVE FREE 10 ML: 5 INJECTION INTRAVENOUS at 22:28

## 2025-02-16 RX ADMIN — WATER 1000 MG: 1 INJECTION INTRAMUSCULAR; INTRAVENOUS; SUBCUTANEOUS at 00:49

## 2025-02-16 RX ADMIN — GABAPENTIN 400 MG: 400 CAPSULE ORAL at 22:26

## 2025-02-16 RX ADMIN — TAMSULOSIN HYDROCHLORIDE 0.4 MG: 0.4 CAPSULE ORAL at 22:26

## 2025-02-16 RX ADMIN — GABAPENTIN 400 MG: 400 CAPSULE ORAL at 15:47

## 2025-02-16 RX ADMIN — SODIUM CHLORIDE, PRESERVATIVE FREE 10 ML: 5 INJECTION INTRAVENOUS at 11:36

## 2025-02-16 RX ADMIN — AZITHROMYCIN MONOHYDRATE 500 MG: 500 INJECTION, POWDER, LYOPHILIZED, FOR SOLUTION INTRAVENOUS at 00:53

## 2025-02-16 ASSESSMENT — PAIN SCALES - GENERAL: PAINLEVEL_OUTOF10: 0

## 2025-02-16 NOTE — ED NOTES
Patient resting in bed. Respirations easy and unlabored. No distress noted. Call light within reach.

## 2025-02-16 NOTE — ED PROVIDER NOTES

## 2025-02-16 NOTE — ED TRIAGE NOTES
Patient presents to ED with chief complaint of cough. Patient states that he had a stent placed in his lungs, and it is overdue to come out. Patient has an appointment on march 5th to have it removed. Patient states he was seen at urgent care where they did xrays and tested him for covid/flu. All were negative. Patient resting in bed. Respirations easy and unlabored. No distress noted. Call light within reach.

## 2025-02-16 NOTE — ED NOTES
Spoke to Thomas on 4K who approved patient transfer to 4K-23. Patient transported upstairs in stable condition.

## 2025-02-16 NOTE — PROGRESS NOTES
Straight cath attempted. Patient unable to remain still, resulting in failed attempt. Patient was educated on the importance of allowing us to empty bladder. Told him we will be back in to try again shortly.

## 2025-02-16 NOTE — CARE COORDINATION
02/16/25 1441   Service Assessment   Patient Orientation Alert and Oriented   Cognition Alert   History Provided By Patient   Primary Caregiver Self   Support Systems Family Members;Children   Patient's Healthcare Decision Maker is: Named in Scanned ACP Document   PCP Verified by CM Yes   Last Visit to PCP Within last year   Prior Functional Level Independent in ADLs/IADLs   Current Functional Level Independent in ADLs/IADLs   Can patient return to prior living arrangement Yes   Ability to make needs known: Good   Family able to assist with home care needs: No   Would you like for me to discuss the discharge plan with any other family members/significant others, and if so, who? No   Financial Resources Medicare   Community Resources None   Social/Functional History   Active  Yes   Discharge Planning   Type of Residence House   Living Arrangements Children  (developmentally delayed daughter)   Current Services Prior To Admission Durable Medical Equipment;C-pap   Current DME Prior to Arrival Cane   Potential Assistance Needed Durable Medical Equipment;Home Care   Potential DME Needed Walker   DME Ordered? No   Potential Assistance Purchasing Medications No   Type of Home Care Services None   Patient expects to be discharged to: House   Services At/After Discharge   Transition of Care Consult (CM Consult) Discharge Planning   Services At/After Discharge None   Mode of Transport at Discharge Other (see comment)  (family)   Confirm Follow Up Transport Self   Condition of Participation: Discharge Planning   The Plan for Transition of Care is related to the following treatment goals: treat pneumonia     Patient Goals/Plan/Treatment Preferences: Spoke with Vladimir, he plans to return home. He is independent and drives. Has cane and reports recently having HH services but has been discharged. His developmentally delayed daughter resides with him. Monitor for home needs.     If patient is discharged prior to next

## 2025-02-16 NOTE — ED NOTES
Having difficulty walking without shortness of breath, did not want to go by EMS to [facility] ED, will drive by private car/self and daughter will accompany ,, trying to call someone to help care for daughter     Reyes Meeks RN  02/15/25 8047

## 2025-02-16 NOTE — PROGRESS NOTES
Pt admitted to  4K23 in a wheelchair.     Complaints: coughing.      IV antibiotic infusing into the forearm right, condition patent and no redness at a rate of 50 mls/ hour with about 100 mls in the bag still. IV site free of s/s of infection or infiltration.     Vital signs obtained. Assessment and data collection initiated. Two nurse skin assessment performed by Noam PEREA and Angelic RN.    Swallow Screen completed and documented for all patients ages 45 and older. Unable to finish due to coughing prior and after drinking.    Policies and procedures for  explained. All questions answered with no further questions at this time. Fall prevention and safety brochure discussed with patient.  Bed alarm on. Call light in reach. Oriented to room.

## 2025-02-16 NOTE — PLAN OF CARE
Department of Otolaryngology  Plan of Care    Reason for Consult:  recent tracheal stent placement  Requesting Physician:  hospitalist team    CHIEF COMPLAINT:  cough    Patient s/p tracheal stent placement 1/7/25 with combined procedure with Dr. Lee. Goal of procedure was to see if dyspnea improves with stent placement to determine need for further treatment for excessive dynamic airway collapse in the future. Patient had significant lack of physical stamina prior to stent placement and could barely ambulate from his chair to the bathroom at home prior to procedure. After stent placement he had an inpatient stay with PT/OT evaluation and developed urinary retention with catheter placement. Patient was ultimately recommended for discharge to SNF and he declined. Patient has had multiple recurrent admissions to ER/UC since procedure with complaints of ongoing cough and dyspnea despite recent procedure. His last office visit 1/23/25 with Dr. Meyers he has noted significant improvement despite his initial perception and plan is for tracheal stent removal by Dr. Lee 3/3/25 and follow up for bronchoscopy with Dr. Meyers to determine any further need for laser procedure to stiffen his airway.     Patient currently admitted for CT findings consistent with pneumonia.  No surgical intervention from ENT standpoint. Okay for patient to eat/drink as tolerable.  Saline nebulizers 0.9% TID and as needed BID 3% saline for thick secretions.   Remainder of care per primary for pneumonia.    Formal patient consult to follow tomorrow AM.    Case reviewed with Dr. Meyers who agrees with plan as noted above.    Electronically signed by CHACHA Jones CNP on 2/16/2025 at 12:07 PM

## 2025-02-16 NOTE — PROCEDURES
PROCEDURE NOTE  Date: 2/16/2025   Name: Vladimir Muñiz  YOB: 1950    Procedures  12 lead EKG completed. Results handed to Juanita PEREA. Juanita ROBLES

## 2025-02-16 NOTE — H&P
History & Physical  Internal Medicine Resident         Patient: Vladimir Muñiz 74 y.o. male      : 1950  Date of Admission: 2/15/2025  Date of Service: Pt seen/examined on 25 and Admitted to Inpatient with expected LOS greater than two midnights due to medical therapy.       ASSESSMENT AND PLAN  Community-acquired pneumonia: CT chest without contrast noted to bilateral groundglass/consolidation.  WBC 12.2, lactic acid 1.3, influenza/COVID-negative. S/p 1 dose of azithromycin and ceftriaxone in ED.   Continue azithromycin and ceftriaxone for 5 days (-)  Blood cultures pending.   Respiratory culture ordered.   Chronic dry cough likely secondary to recent stent placement due to tracheobronchomalacia: Patient reported ongoing cough post stent placement (2025).  Had a follow-up with Dr. Meyers, postsurgery noted to be improving with dyspnea.   Consulted ENT for further evaluation and recommendation.   Ordered 3% saline nebs + Albuterol as needed + Tessalon.   Patient is n.p.o. for possible intervention.   HAGMA: Anion gap 17, bicarb 22, lactic acid 1.0, beta hydroxybutyrate 7.44.  Patient reports decreased p.o. intake. patient denies any nausea/vomiting, diarrhea.   Continue monitoring with daily lab, if worsens can start on gentle fluid.  Currently patient n.p.o. for possible intervention from ENT.   Depression?:  Patient reported loss of interest in things, feeling lonely at home, decreased appetite, increase anxiety.  Outpatient psychology follow-up  Chronic Conditions (reviewed and stable unless otherwise stated)   HTN: Continuing home dose Imdur 60 mg, amlodipine 5 mg.  Benazepril switch to lisinopril 40 mg/daily.  HLD: Pravastatin 40 mg, ezetimibe 10 mg. (pravastatin patient tolerating at home)  Cardiac arrhythmia?  Patient was last on sotalol and prasugrel. No recent dispense.   Restless leg syndrome: Continue home dose gabapentin 400 mg/3 times daily.   Obstructive sleep apnea:

## 2025-02-16 NOTE — ED NOTES
ED to inpatient nurses report      Chief Complaint:  Chief Complaint   Patient presents with    Cough     Onset  5 or 6 weeks  not getting any better. Has a  procedure scheduled  march 5th and wants to be well for it. Gets winded easily due to the constant coughing.     Present to ED from: home    MOA:     LOC: alert and orientated to name, place, date  Mobility: Requires assistance * 1  Oxygen Baseline: none    Current needs required: none     Code Status:   Prior    Mental Status:  Level of Consciousness: Alert (0)    Psych Assessment:        Vitals:  Patient Vitals for the past 24 hrs:   BP Temp Pulse Resp SpO2 Weight   02/16/25 0039 137/81 -- 75 15 96 % --   02/15/25 2254 133/65 -- 78 21 95 % --   02/15/25 2143 (!) 130/53 -- 73 18 94 % --   02/15/25 2013 133/74 -- 82 15 93 % 72.6 kg (160 lb)   02/15/25 1837 111/62 97.9 °F (36.6 °C) 82 20 93 % --        LDAs:      Ambulatory Status:  No data recorded    Diagnosis:  DISPOSITION Decision To Admit 02/16/2025 12:07:45 AM   Final diagnoses:   Tracheobronchomalacia   Lab test negative for COVID-19 virus   Pneumonia of both lower lobes due to infectious organism   Elevated troponin        Consults:  None     Pain Score:  Pain Assessment  Pain Assessment: None - Denies Pain    C-SSRS:   Risk of Suicide: No Risk    Sepsis Screening:       [place] Fall Risk:       Swallow Screening        Preferred Language:   English      ALLERGIES     Sildenafil citrate, Cephalexin, Baycol [cerivastatin sodium], Cetirizine & related, Cialis [tadalafil], Crestor [rosuvastatin calcium], Lipitor, Ropinirole hcl, and Zocor [simvastatin]    SURGICAL HISTORY       Past Surgical History:   Procedure Laterality Date    BACK SURGERY      BRONCHOSCOPY N/A 01/06/2025    Bronchoscopy with Tracheal Stent Placement performed by Gustavo Mcnulty DO at [facility] OR    CHOLECYSTECTOMY, LAPAROSCOPIC      CHOLECYSTECTOMY, LAPAROSCOPIC      LARYNGOSCOPY N/A 01/06/2025    LARYNGOSCOPY/TRACHEOSCOPY performed by

## 2025-02-16 NOTE — PLAN OF CARE
HPI:  Per initial H/P: Vladimir Muñiz is a 74 y.o. male with PMHx of tracheomalacia, HTN, HLD, restless leg syndrome, anxiety who presents to [facility] with chronic cough.  Patient reported ongoing since the previous stenting surgery done for tracheomalacia.  Patient had a follow-up visit with Dr. Meyers, and reported his dyspnea has been improving.  Patient was supposed to schedule a follow-up appointment for possible bronchoscopy and further evaluation, which patient has been unable to schedule.  Patient also reported trouble swallowing, ongoing for past couple of months and total weight loss of 20 pounds.  Patient also reports feeling lonely at home, unable to cook food for himself lost interest in things and also loss of appetite.  Patient reported that he wanted someone to hear about his pain and chronic cough and that's another reason he presented to the hospital.  Patient denies fever/chills, nausea/vomiting, pain, urinary symptoms, constipation/diarrhea, chest pain, chest palpitation.     A/P:  Community-acquired pneumonia: CT chest without contrast noted to bilateral groundglass/consolidation.  WBC on arrival 12.2, now 11.5. Lactic acidosis resolved. Influenza/COVID-negative. S/p 1 dose of azithromycin and ceftriaxone in ED. Remains on antibiotics Zithromax 250mg IV daily, Rocephin 1G daily 2/16-2/20. Pending BC 1/2, MRSA nares, resp cultures.     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  Had a follow-up with Dr. Meyers, postsurgery noted to be improving with dyspnea. ENT consulted for further recommendations, which are to continue CAP treatment and contact Dr. Acosta if patient further escalated dysphagia. Continue 3% saline nebs+ Albuterol PRN+ Tessalon. Keep patient NPO. Pending official ENT evaluation 2/17.    Nonspecific chest pain: trops, EKG RBBB. Possibly

## 2025-02-16 NOTE — ED PROVIDER NOTES
[facility] EMERGENCY DEPARTMENT  EMERGENCY DEPARTMENT ENCOUNTER          Pt Name: Vladimir Muñiz  MRN: [id number]  Birthdate 1950  Date of evaluation: 2/15/2025  Physician: Diamante Johansen MD  Supervising Attending Physician: Juanita Dior MD       CHIEF COMPLAINT       Chief Complaint   Patient presents with    Cough     Onset  5 or 6 weeks  not getting any better. Has a  procedure scheduled  march 5th and wants to be well for it. Gets winded easily due to the constant coughing.         HISTORY OF PRESENT ILLNESS    HPI  Vladimir Muñiz is a 74 y.o. male who presents to the emergency department  from urgent care  for evaluation of cough.  Patient had a bronchoscopy started January for tracheobronchomalacia and had a stent inserted in his trachea.  He was supposed to be evaluated by Dr. Meyers around 2/17 however he is scheduled for procedure on 3/6/2025.  Patient stated that he had been having increasing shortness of breath with exertion, was unable to keep food down and has been having epigastric pain that is nonradiating and feels like heartburn.    Denies fever, chills, headache, lightheadedness, dizziness, vision changes, tinnitus, cough, congestion, sore throat, neck pain/stiffness, chest pain, shortness of breath, abdominal pain, nausea, vomiting, constipation, diarrhea, dysuria, blood in the urine or stool, numbness/tingling/weakness in extremities.    The patient has no other acute complaints at this time.      PAST MEDICAL AND SURGICAL HISTORY     Past Medical History:   Diagnosis Date    Arrhythmia     CAD (coronary artery disease)     stents placed    Dizziness     Ganglion cyst     GERD (gastroesophageal reflux disease) 04/15/2014    Headache 01/15/2014    Hyperlipidemia     Hypertension     Major depression 12/15/2014    Osteoarthritis     Restless legs syndrome     RLS (restless legs syndrome)     SOB (shortness of breath)     Urinary retention 1/7/2025     Past Surgical

## 2025-02-17 ENCOUNTER — APPOINTMENT (OUTPATIENT)
Dept: CT IMAGING | Age: 75
End: 2025-02-17
Payer: COMMERCIAL

## 2025-02-17 PROBLEM — R07.9 CHEST PAIN: Status: ACTIVE | Noted: 2025-02-17

## 2025-02-17 PROBLEM — R79.89 ELEVATED TROPONIN: Status: ACTIVE | Noted: 2025-02-17

## 2025-02-17 PROBLEM — F33.2 SEVERE RECURRENT MAJOR DEPRESSION WITHOUT PSYCHOTIC FEATURES (HCC): Status: ACTIVE | Noted: 2025-02-17

## 2025-02-17 LAB
ANION GAP SERPL CALC-SCNC: 24 MEQ/L (ref 8–16)
B-OH-BUTYR SERPL-MSCNC: 57.9 MG/DL (ref 0.2–2.81)
BASOPHILS ABSOLUTE: 0.1 THOU/MM3 (ref 0–0.1)
BASOPHILS NFR BLD AUTO: 0.6 %
BUN SERPL-MCNC: 23 MG/DL (ref 7–22)
CA-I BLD ISE-SCNC: 1.19 MMOL/L (ref 1.12–1.32)
CALCIUM SERPL-MCNC: 8.9 MG/DL (ref 8.5–10.5)
CHLORIDE SERPL-SCNC: 103 MEQ/L (ref 98–111)
CO2 SERPL-SCNC: 18 MEQ/L (ref 23–33)
CREAT SERPL-MCNC: 0.9 MG/DL (ref 0.4–1.2)
DEPRECATED RDW RBC AUTO: 45.1 FL (ref 35–45)
EKG ATRIAL RATE: 96 BPM
EKG P AXIS: 44 DEGREES
EKG P-R INTERVAL: 184 MS
EKG Q-T INTERVAL: 384 MS
EKG QRS DURATION: 128 MS
EKG QTC CALCULATION (BAZETT): 485 MS
EKG R AXIS: -39 DEGREES
EKG T AXIS: 31 DEGREES
EKG VENTRICULAR RATE: 96 BPM
EOSINOPHIL NFR BLD AUTO: 1.4 %
EOSINOPHILS ABSOLUTE: 0.1 THOU/MM3 (ref 0–0.4)
ERYTHROCYTE [DISTWIDTH] IN BLOOD BY AUTOMATED COUNT: 13.2 % (ref 11.5–14.5)
GFR SERPL CREATININE-BSD FRML MDRD: 89 ML/MIN/1.73M2
GLUCOSE BLD STRIP.AUTO-MCNC: 109 MG/DL (ref 70–108)
GLUCOSE BLD STRIP.AUTO-MCNC: 163 MG/DL (ref 70–108)
GLUCOSE BLD STRIP.AUTO-MCNC: 172 MG/DL (ref 70–108)
GLUCOSE BLD STRIP.AUTO-MCNC: 179 MG/DL (ref 70–108)
GLUCOSE BLD STRIP.AUTO-MCNC: 44 MG/DL (ref 70–108)
GLUCOSE BLD STRIP.AUTO-MCNC: 50 MG/DL (ref 70–108)
GLUCOSE SERPL-MCNC: 49 MG/DL (ref 70–108)
HCT VFR BLD AUTO: 38.7 % (ref 42–52)
HGB BLD-MCNC: 12.1 GM/DL (ref 14–18)
IMM GRANULOCYTES # BLD AUTO: 0.04 THOU/MM3 (ref 0–0.07)
IMM GRANULOCYTES NFR BLD AUTO: 0.5 %
LACTATE SERPL-SCNC: 1.2 MMOL/L (ref 0.5–2)
LYMPHOCYTES ABSOLUTE: 1.9 THOU/MM3 (ref 1–4.8)
LYMPHOCYTES NFR BLD AUTO: 22.7 %
MCH RBC QN AUTO: 29.4 PG (ref 26–33)
MCHC RBC AUTO-ENTMCNC: 31.3 GM/DL (ref 32.2–35.5)
MCV RBC AUTO: 93.9 FL (ref 80–94)
MONOCYTES ABSOLUTE: 0.6 THOU/MM3 (ref 0.4–1.3)
MONOCYTES NFR BLD AUTO: 7.4 %
NEUTROPHILS ABSOLUTE: 5.7 THOU/MM3 (ref 1.8–7.7)
NEUTROPHILS NFR BLD AUTO: 67.4 %
NRBC BLD AUTO-RTO: 0 /100 WBC
PLATELET # BLD AUTO: 168 THOU/MM3 (ref 130–400)
PMV BLD AUTO: 11.7 FL (ref 9.4–12.4)
POTASSIUM SERPL-SCNC: 4.7 MEQ/L (ref 3.5–5.2)
RBC # BLD AUTO: 4.12 MILL/MM3 (ref 4.7–6.1)
SODIUM SERPL-SCNC: 145 MEQ/L (ref 135–145)
TROPONIN, HIGH SENSITIVITY: 41 NG/L (ref 0–12)
TROPONIN, HIGH SENSITIVITY: 42 NG/L (ref 0–12)
WBC # BLD AUTO: 8.4 THOU/MM3 (ref 4.8–10.8)

## 2025-02-17 PROCEDURE — 36415 COLL VENOUS BLD VENIPUNCTURE: CPT

## 2025-02-17 PROCEDURE — 2060000000 HC ICU INTERMEDIATE R&B

## 2025-02-17 PROCEDURE — 82330 ASSAY OF CALCIUM: CPT

## 2025-02-17 PROCEDURE — 80048 BASIC METABOLIC PNL TOTAL CA: CPT

## 2025-02-17 PROCEDURE — 90792 PSYCH DIAG EVAL W/MED SRVCS: CPT | Performed by: PSYCHIATRY & NEUROLOGY

## 2025-02-17 PROCEDURE — 2580000003 HC RX 258: Performed by: STUDENT IN AN ORGANIZED HEALTH CARE EDUCATION/TRAINING PROGRAM

## 2025-02-17 PROCEDURE — 99222 1ST HOSP IP/OBS MODERATE 55: CPT | Performed by: REGISTERED NURSE

## 2025-02-17 PROCEDURE — 6360000002 HC RX W HCPCS

## 2025-02-17 PROCEDURE — 2500000003 HC RX 250 WO HCPCS

## 2025-02-17 PROCEDURE — 99233 SBSQ HOSP IP/OBS HIGH 50: CPT | Performed by: STUDENT IN AN ORGANIZED HEALTH CARE EDUCATION/TRAINING PROGRAM

## 2025-02-17 PROCEDURE — 6360000002 HC RX W HCPCS: Performed by: STUDENT IN AN ORGANIZED HEALTH CARE EDUCATION/TRAINING PROGRAM

## 2025-02-17 PROCEDURE — 82948 REAGENT STRIP/BLOOD GLUCOSE: CPT

## 2025-02-17 PROCEDURE — 2580000003 HC RX 258

## 2025-02-17 PROCEDURE — 6370000000 HC RX 637 (ALT 250 FOR IP): Performed by: STUDENT IN AN ORGANIZED HEALTH CARE EDUCATION/TRAINING PROGRAM

## 2025-02-17 PROCEDURE — 84484 ASSAY OF TROPONIN QUANT: CPT

## 2025-02-17 PROCEDURE — 51798 US URINE CAPACITY MEASURE: CPT

## 2025-02-17 PROCEDURE — 97535 SELF CARE MNGMENT TRAINING: CPT

## 2025-02-17 PROCEDURE — 93005 ELECTROCARDIOGRAM TRACING: CPT

## 2025-02-17 PROCEDURE — 2700000000 HC OXYGEN THERAPY PER DAY

## 2025-02-17 PROCEDURE — 92610 EVALUATE SWALLOWING FUNCTION: CPT

## 2025-02-17 PROCEDURE — 85025 COMPLETE CBC W/AUTO DIFF WBC: CPT

## 2025-02-17 PROCEDURE — 6370000000 HC RX 637 (ALT 250 FOR IP)

## 2025-02-17 PROCEDURE — 99223 1ST HOSP IP/OBS HIGH 75: CPT | Performed by: INTERNAL MEDICINE

## 2025-02-17 PROCEDURE — 97166 OT EVAL MOD COMPLEX 45 MIN: CPT

## 2025-02-17 PROCEDURE — 97530 THERAPEUTIC ACTIVITIES: CPT

## 2025-02-17 PROCEDURE — 82010 KETONE BODYS QUAN: CPT

## 2025-02-17 PROCEDURE — 6360000004 HC RX CONTRAST MEDICATION: Performed by: STUDENT IN AN ORGANIZED HEALTH CARE EDUCATION/TRAINING PROGRAM

## 2025-02-17 PROCEDURE — 83605 ASSAY OF LACTIC ACID: CPT

## 2025-02-17 PROCEDURE — 94660 CPAP INITIATION&MGMT: CPT

## 2025-02-17 PROCEDURE — 97162 PT EVAL MOD COMPLEX 30 MIN: CPT

## 2025-02-17 PROCEDURE — 70491 CT SOFT TISSUE NECK W/DYE: CPT

## 2025-02-17 PROCEDURE — 94761 N-INVAS EAR/PLS OXIMETRY MLT: CPT

## 2025-02-17 PROCEDURE — 93010 ELECTROCARDIOGRAM REPORT: CPT | Performed by: INTERNAL MEDICINE

## 2025-02-17 RX ORDER — GLUCAGON 1 MG/ML
1 KIT INJECTION PRN
Status: DISCONTINUED | OUTPATIENT
Start: 2025-02-17 | End: 2025-02-21 | Stop reason: HOSPADM

## 2025-02-17 RX ORDER — NITROGLYCERIN 0.4 MG/1
0.4 TABLET SUBLINGUAL EVERY 5 MIN PRN
Status: CANCELLED | OUTPATIENT
Start: 2025-02-17 | End: 2025-02-18

## 2025-02-17 RX ORDER — DEXTROSE MONOHYDRATE 100 MG/ML
INJECTION, SOLUTION INTRAVENOUS CONTINUOUS PRN
Status: DISCONTINUED | OUTPATIENT
Start: 2025-02-17 | End: 2025-02-21 | Stop reason: HOSPADM

## 2025-02-17 RX ORDER — MIRTAZAPINE 7.5 MG/1
7.5 TABLET, FILM COATED ORAL NIGHTLY
Status: DISCONTINUED | OUTPATIENT
Start: 2025-02-17 | End: 2025-02-21 | Stop reason: HOSPADM

## 2025-02-17 RX ORDER — SODIUM CHLORIDE 0.9 % (FLUSH) 0.9 %
5-40 SYRINGE (ML) INJECTION EVERY 12 HOURS SCHEDULED
Status: DISCONTINUED | OUTPATIENT
Start: 2025-02-17 | End: 2025-02-18 | Stop reason: HOSPADM

## 2025-02-17 RX ORDER — SODIUM CHLORIDE 9 MG/ML
500 INJECTION, SOLUTION INTRAVENOUS CONTINUOUS PRN
Status: CANCELLED | OUTPATIENT
Start: 2025-02-17 | End: 2025-02-18

## 2025-02-17 RX ORDER — DEXTROSE MONOHYDRATE AND SODIUM CHLORIDE 5; .45 G/100ML; G/100ML
INJECTION, SOLUTION INTRAVENOUS CONTINUOUS
Status: DISCONTINUED | OUTPATIENT
Start: 2025-02-17 | End: 2025-02-19

## 2025-02-17 RX ORDER — AMINOPHYLLINE 25 MG/ML
50 INJECTION, SOLUTION INTRAVENOUS PRN
Status: CANCELLED | OUTPATIENT
Start: 2025-02-17 | End: 2025-02-18

## 2025-02-17 RX ORDER — SODIUM CHLORIDE 9 MG/ML
25 INJECTION, SOLUTION INTRAVENOUS PRN
Status: DISCONTINUED | OUTPATIENT
Start: 2025-02-17 | End: 2025-02-18 | Stop reason: HOSPADM

## 2025-02-17 RX ORDER — ATROPINE SULFATE 0.1 MG/ML
0.5 INJECTION INTRAVENOUS EVERY 5 MIN PRN
Status: CANCELLED | OUTPATIENT
Start: 2025-02-17 | End: 2025-02-18

## 2025-02-17 RX ORDER — SODIUM CHLORIDE 0.9 % (FLUSH) 0.9 %
5-40 SYRINGE (ML) INJECTION PRN
Status: CANCELLED | OUTPATIENT
Start: 2025-02-17 | End: 2025-02-18

## 2025-02-17 RX ORDER — METOPROLOL TARTRATE 1 MG/ML
5 INJECTION, SOLUTION INTRAVENOUS EVERY 5 MIN PRN
Status: CANCELLED | OUTPATIENT
Start: 2025-02-17 | End: 2025-02-18

## 2025-02-17 RX ORDER — IOPAMIDOL 755 MG/ML
80 INJECTION, SOLUTION INTRAVASCULAR
Status: COMPLETED | OUTPATIENT
Start: 2025-02-17 | End: 2025-02-17

## 2025-02-17 RX ORDER — ALBUTEROL SULFATE 90 UG/1
2 INHALANT RESPIRATORY (INHALATION) PRN
Status: CANCELLED | OUTPATIENT
Start: 2025-02-17 | End: 2025-02-18

## 2025-02-17 RX ORDER — SODIUM CHLORIDE 0.9 % (FLUSH) 0.9 %
5-40 SYRINGE (ML) INJECTION PRN
Status: DISCONTINUED | OUTPATIENT
Start: 2025-02-17 | End: 2025-02-18 | Stop reason: HOSPADM

## 2025-02-17 RX ORDER — REGADENOSON 0.08 MG/ML
0.4 INJECTION, SOLUTION INTRAVENOUS
Status: CANCELLED | OUTPATIENT
Start: 2025-02-17

## 2025-02-17 RX ADMIN — IOPAMIDOL 80 ML: 755 INJECTION, SOLUTION INTRAVENOUS at 16:31

## 2025-02-17 RX ADMIN — PRAVASTATIN SODIUM 40 MG: 40 TABLET ORAL at 20:16

## 2025-02-17 RX ADMIN — EZETIMIBE 10 MG: 10 TABLET ORAL at 08:30

## 2025-02-17 RX ADMIN — GABAPENTIN 400 MG: 400 CAPSULE ORAL at 08:30

## 2025-02-17 RX ADMIN — AZITHROMYCIN MONOHYDRATE 250 MG: 500 INJECTION, POWDER, LYOPHILIZED, FOR SOLUTION INTRAVENOUS at 00:54

## 2025-02-17 RX ADMIN — MIRTAZAPINE 7.5 MG: 7.5 TABLET, FILM COATED ORAL at 20:16

## 2025-02-17 RX ADMIN — WATER 1000 MG: 1 INJECTION INTRAMUSCULAR; INTRAVENOUS; SUBCUTANEOUS at 00:49

## 2025-02-17 RX ADMIN — DEXTROSE MONOHYDRATE 125 ML: 100 INJECTION, SOLUTION INTRAVENOUS at 09:54

## 2025-02-17 RX ADMIN — GABAPENTIN 400 MG: 400 CAPSULE ORAL at 20:16

## 2025-02-17 RX ADMIN — LISINOPRIL 40 MG: 40 TABLET ORAL at 08:30

## 2025-02-17 RX ADMIN — TAMSULOSIN HYDROCHLORIDE 0.4 MG: 0.4 CAPSULE ORAL at 08:30

## 2025-02-17 RX ADMIN — MORPHINE SULFATE 1 MG: 2 INJECTION, SOLUTION INTRAMUSCULAR; INTRAVENOUS at 00:59

## 2025-02-17 RX ADMIN — AMLODIPINE BESYLATE 5 MG: 5 TABLET ORAL at 08:29

## 2025-02-17 RX ADMIN — PANTOPRAZOLE SODIUM 40 MG: 40 TABLET, DELAYED RELEASE ORAL at 06:02

## 2025-02-17 RX ADMIN — OXYBUTYNIN CHLORIDE 5 MG: 5 TABLET, EXTENDED RELEASE ORAL at 08:30

## 2025-02-17 RX ADMIN — DEXTROSE AND SODIUM CHLORIDE: 5; .45 INJECTION, SOLUTION INTRAVENOUS at 11:59

## 2025-02-17 RX ADMIN — TAMSULOSIN HYDROCHLORIDE 0.4 MG: 0.4 CAPSULE ORAL at 20:16

## 2025-02-17 RX ADMIN — SODIUM CHLORIDE, PRESERVATIVE FREE 10 ML: 5 INJECTION INTRAVENOUS at 08:35

## 2025-02-17 RX ADMIN — BENZONATATE 100 MG: 100 CAPSULE ORAL at 20:16

## 2025-02-17 RX ADMIN — ENOXAPARIN SODIUM 40 MG: 100 INJECTION SUBCUTANEOUS at 08:29

## 2025-02-17 RX ADMIN — GABAPENTIN 400 MG: 400 CAPSULE ORAL at 15:54

## 2025-02-17 RX ADMIN — ISOSORBIDE MONONITRATE 60 MG: 60 TABLET, EXTENDED RELEASE ORAL at 08:30

## 2025-02-17 ASSESSMENT — PAIN DESCRIPTION - DESCRIPTORS
DESCRIPTORS: SHARP
DESCRIPTORS: SHARP

## 2025-02-17 ASSESSMENT — PAIN DESCRIPTION - ORIENTATION
ORIENTATION: RIGHT
ORIENTATION: RIGHT

## 2025-02-17 ASSESSMENT — ENCOUNTER SYMPTOMS
CONSTIPATION: 0
NAUSEA: 0
ABDOMINAL PAIN: 0
SHORTNESS OF BREATH: 1
TROUBLE SWALLOWING: 1
ALLERGIC/IMMUNOLOGIC NEGATIVE: 1
WHEEZING: 0
DIARRHEA: 0
BLOOD IN STOOL: 0
EYES NEGATIVE: 1
VOMITING: 0
ANAL BLEEDING: 0
ABDOMINAL DISTENTION: 0
RECTAL PAIN: 0
COUGH: 1

## 2025-02-17 ASSESSMENT — PAIN - FUNCTIONAL ASSESSMENT
PAIN_FUNCTIONAL_ASSESSMENT: ACTIVITIES ARE NOT PREVENTED
PAIN_FUNCTIONAL_ASSESSMENT: ACTIVITIES ARE NOT PREVENTED

## 2025-02-17 ASSESSMENT — PAIN DESCRIPTION - LOCATION
LOCATION: CHEST
LOCATION: CHEST

## 2025-02-17 ASSESSMENT — PAIN SCALES - GENERAL
PAINLEVEL_OUTOF10: 1
PAINLEVEL_OUTOF10: 7

## 2025-02-17 NOTE — CONSULTS
[facility]'s  Resident Consult Note    Patient's Name/Date of Birth: Vladimir Muñiz / 1950 (74 y.o.)    Date: February 17, 2025     Referring Provider: Behl, Ashita, MD    Consulted for: Troponin elevation, acute       HPI: This is a pleasant 74 y.o. male presenting with chronic cough.  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  On admission, patient was found to have bilateral groundglass infiltrates on CT chest, is being treated inpatient for commune acquired pneumonia.  He is s/p tracheal stent placement 1/6/2025 with Dr. Meyers.  Cardiology was consulted for elevated troponin.  Review of EKGs shows development of right bundle branch block.  Troponins mildly elevated, with negative change.  2/17 troponin 41, increasing to 42 later that morning.  Follows with Dr. Sanders cardiologist.  It is unclear why the patient is on sotalol.  The patient himself complains of epigastric pain which is worsened with exertion.  He notes that this is a new pain which she has not had before, and has been ongoing for the past 2 weeks or so.  Patient has history of CAD, with stress test many years in the past.    EKG/Echo: EKG 2/17 normal sinus rhythm with left axis, right bundle branch block, rate 96, , QTc 45, unchanged from EKG 2/16.  EKG 2/16: Normal sinus rhythm, RBBB, septal infarct cited before 2/15/2025.  EKG 2/15: Normal sinus rhythm with septal infarct of undetermined age, QTc 479.  EKG 1/16/2025 normal sinus rhythm with no RBBB.      All labs, EKG's, diagnostic testing and images as well as cardiac cath, stress testing were reviewed during this encounter    Past Medical History:   Diagnosis Date    Anxiety 2/16/2025    Arrhythmia     CAD (coronary artery disease)     stents placed    Dizziness     Ganglion cyst     GERD (gastroesophageal reflux disease) 04/15/2014    Headache 01/15/2014    Hyperlipidemia      Statement Selected

## 2025-02-17 NOTE — CONSULTS
Consult History & Physical      Patient:  Vladimir Muñiz  YOB: 1950  MRN: [id number]     Acct: [id number]  Code Status: Limited  PCP: Eric Ramírez MD      Chief Complaint:    Chief Complaint   Patient presents with    Cough     Onset  5 or 6 weeks  not getting any better. Has a  procedure scheduled  march 5th and wants to be well for it. Gets winded easily due to the constant coughing.       Date of Service: Pt seen/examined in consultation on 2/17/2025    History Of Present Illness:      Vladimir Muñiz  is a 74 y.o. male who we are asked to see/evaluate by Behl, Ashita, MD for medical management of odynophagia, dysphagia, and weight loss. Patient presented to the ED 2/15/25 due to cough.  Patient had a bronchoscopy in January due to tracheobronchomalacia and had a tracheal stent placed.  He had an appt to be evaluated by Dr. Meyers 2/17/25 with a procedure on 3/6/25.  Reported increasing shortness of breath with exertion and unable to keep food down with epigastric pain that feels like heartburn.  Patient also reports odynophagia, dysphagia, and unintentional weight loss.  Patient diagnosed with community acquired pneumonia of bilateral lungs.  PMHx significant for depression, HTN, HLD, Cardiac arrhythmia?, RLS on gabapentin, ROHIT on CPAP at night, Anxiety, GERD on protonix daily, and neurogenic bladder/BPH.  Patient reports he has had trouble swallowing for months.  He reports that he struggles if foods aren't the right temperature or if they don't taste good.  He also reports sometimes food will get stuck- he denies having to vomit to bring food up but reports he has to drink water and orange juice- because of this he doesn't eat while his daughter is gone because it scares him and he can't catch his breath when it happens.  He denies any issues with liquids, saliva, or soft foods like pudding and jello.  He does report pills getting stuck and points to middle of his neck where  MD Tien   benazepril (LOTENSIN) 40 MG tablet Take 1 tablet by mouth daily 12/14/15  Yes Eric Ramírez MD   amLODIPine (NORVASC) 5 MG tablet TAKE ONE TABLET BY MOUTH ONCE DAILY 9/10/15  Yes Eric Ramírez MD   fish oil-omega-3 fatty acids 1000 MG capsule Take 1 capsule by mouth 2 times daily   Yes Tien Luke MD   therapeutic multivitamin-minerals (THERAGRAN-M) tablet Take 1 tablet by mouth daily   Yes Provider, MD Tien   tamsulosin (FLOMAX) 0.4 MG capsule Take 1 capsule by mouth 2 times daily 2/7/25   Belkys Forbes APRN - CNP   guaiFENesin (MUCINEX) 600 MG extended release tablet Take 1 tablet by mouth 2 times daily for 15 days 2/4/25 2/19/25  Ramya Lee APRN - CNP   oxyBUTYnin (DITROPAN XL) 5 MG extended release tablet Take 1 tablet by mouth daily 1/14/25   Basil Brown MD   albuterol (PROVENTIL) (2.5 MG/3ML) 0.083% nebulizer solution Take 3 mLs by nebulization in the morning and at bedtime 1/13/25   Conchita Cedeno APRN - CNP   acetylcysteine (MUCOMYST) 20 % nebulizer solution Inhale 3 mLs into the lungs in the morning and 3 mLs in the evening. 1/13/25 2/12/25  Conchita Cedeno APRN - CNP   sodium chloride, Inhalant, 3 % nebulizer solution Take 4 mLs by nebulization as needed for Other (For thick secretions unable to expel with 0.9% saline) 1/13/25   Conchita Cedeno APRN - CNP   prasugrel (EFFIENT) 10 MG TABS  8/25/24   Tien Luke MD   melatonin 3 MG TABS tablet Take 1 tablet by mouth daily 12/14/16   Gavin Perla APRN - CNP       Surgical History:  Past Surgical History:   Procedure Laterality Date    BACK SURGERY      BRONCHOSCOPY N/A 01/06/2025    Bronchoscopy with Tracheal Stent Placement performed by Gustavo Mcnulty DO at [facility] OR    CHOLECYSTECTOMY, LAPAROSCOPIC      CHOLECYSTECTOMY, LAPAROSCOPIC      LARYNGOSCOPY N/A 01/06/2025    LARYNGOSCOPY/TRACHEOSCOPY performed by Jeremy Meyers MD at [facility] OR    LUNG REMOVAL,

## 2025-02-17 NOTE — PROGRESS NOTES
Reinforced suicide precautions with patient.  Reinforced that visitors will be screened and may be limited at the discretion of the nurse.  Visitor belongings are subject to be searched.  Belongings may not be allowed into the patient room.    Reviewed any personal belongings from the previous shift believed to be a threat to patient safety removed from room.  Belongings removed include: Placed in secure area personal belonging bag.    Room screened for safety, items removed to create a safe environment include:   Blood pressure cuff   Loose or extra cords, tubing (not of medical necessity)   Extra Linens   Telephone   Toiletries   Trash Liners   Patient's cell phone and charging cord (must be removed from room)      Safety tray ordered: yes    Expectations were discussed with sitter (unit support aide).  Sitter positioned near exit.  Sitter reminded that patient should be observed at all times including toileting and bathing.  Sitter has  documentation sheet.    Patient and sitter included in hourly rounds.

## 2025-02-17 NOTE — H&P
[facility]  Sedation/Analgesia History & Physical    Patient: Vladimir Muñiz : 1950  Med Rec#: [id number] Acc#: [id number]   Provider Performing Procedure: Mary Martinez MD  Primary Care Physician: Eric Ramírez MD    PRE-PROCEDURE   Brief History/Pre-Procedure Diagnosis:The patient is a 74 y.o.,  male with significant past medical history of   odynophagia, dysphagia, and weight loss. Patient presented to the ED 2/15/25 due to cough.  Patient had a bronchoscopy in January due to tracheobronchomalacia and had a tracheal stent placed.  He had an appt to be evaluated by Dr. Meyers 25 with a procedure on 3/6/25.  Reported increasing shortness of breath with exertion and unable to keep food down with epigastric pain that feels like heartburn.  Patient also reports orodynophagia, dysphagia, and unintentional weight loss.  Patient diagnosed with community acquired pneumonia of bilateral lungs.  PMHx significant for depression, HTN, HLD, Cardiac arrhythmia?, RLS on gabapentin, ROHIT on CPAP at night, Anxiety, GERD on protonix daily, and neurogenic bladder/BPH.  Patient reports he has had trouble swallowing for months.  He reports that he struggles if foods aren't the right temperature or if they don't taste good.  He also reports sometimes food will get stuck- he denies having to vomit to bring food up but reports he has to drink water and orange juice- because of this he doesn't eat while his daughter is gone because it scares him and he can't catch his breath when it happens.  He denies any issues with liquids, saliva, or soft foods like pudding and jello.  He does report pills getting stuck and points to middle of his neck where they get stuck.           MEDICAL HISTORY  []CAD/Valve  []Liver Disease  []Lung Disease []Diabetes  []Hypertension []Renal Disease  [x]Additional information:       has a past medical history of Anxiety, Arrhythmia, CAD (coronary artery disease),

## 2025-02-17 NOTE — CONSULTS
Department of Otolaryngology  Consult Note    Reason for Consult:  cough, recent tracheal stent placement  Requesting Physician:  hospitalist     CHIEF COMPLAINT:  cough    History Obtained From:  patient, electronic medical record    HISTORY OF PRESENT ILLNESS:                The patient is a 74 y.o. male who presents with worsening cough. Patient s/p tracheal stent placement 1/7/25 with combined procedure with Dr. Lee. Goal of procedure was to see if dyspnea improves with stent placement to determine need for further treatment for excessive dynamic airway collapse in the future. Patient had significant lack of physical stamina prior to stent placement and could barely ambulate from his chair to the bathroom at home prior to procedure. After stent placement he had an inpatient stay with PT/OT evaluation and developed urinary retention with catheter placement. Patient was ultimately recommended for discharge to SNF and he declined. Patient has had multiple recurrent admissions to ER/UC since procedure with complaints of ongoing cough and dyspnea despite recent procedure. His last office visit 1/23/25 with Dr. Meyers he has noted significant improvement despite his initial perception and plan is for tracheal stent removal by Dr. Lee 3/3/25 and follow up for bronchoscopy with Dr. Meyers to determine any further need for laser procedure to stiffen his airway.     Patient is currently admitted with CT findings consistent with pneumonia receiving IV antibiotic therapy.    He is being evaluated by psychiatry and currently has a suicide sitter at bedside for precautions.     Patient endorses persistent frustration regarding his clinical course specifically persistent cough and dyspnea with exertion. However notably during his conversation with me and on exam he is no longer dyspneic with conversation as he previously was prior to stent placement. His main concern upon discussion is in regards to his  cold breakfast.     ALLERGIES:  Sildenafil citrate, Cephalexin, Baycol [cerivastatin sodium], Cetirizine & related, Cialis [tadalafil], Crestor [rosuvastatin calcium], Lipitor, Ropinirole hcl, and Zocor [simvastatin]    Past Medical History:  Past Medical History:   Diagnosis Date    Anxiety 2/16/2025    Arrhythmia     CAD (coronary artery disease)     stents placed    Dizziness     Ganglion cyst     GERD (gastroesophageal reflux disease) 04/15/2014    Headache 01/15/2014    Hyperlipidemia     Hypertension     Major depression 12/15/2014    ROHIT (obstructive sleep apnea) 2/16/2025    Osteoarthritis     Restless legs syndrome     RLS (restless legs syndrome)     SOB (shortness of breath)     Urinary retention 1/7/2025       PSM:  Past Surgical History:   Procedure Laterality Date    BACK SURGERY      BRONCHOSCOPY N/A 01/06/2025    Bronchoscopy with Tracheal Stent Placement performed by Gustavo Mcnulty DO at [facility] OR    CHOLECYSTECTOMY, LAPAROSCOPIC      CHOLECYSTECTOMY, LAPAROSCOPIC      LARYNGOSCOPY N/A 01/06/2025    LARYNGOSCOPY/TRACHEOSCOPY performed by Jeremy Meyers MD at [facility] OR    LUNG REMOVAL, TOTAL N/A     NECK SURGERY      TESTICLE SURGERY         Family History:       Problem Relation Age of Onset    High Blood Pressure Mother     Heart Disease Mother         chf    Heart Disease Father         chf    High Blood Pressure Father     Cancer Sister         lymph noids    Cancer Brother         agent orange       Surgical History:  Past Surgical History:   Procedure Laterality Date    BACK SURGERY      BRONCHOSCOPY N/A 01/06/2025    Bronchoscopy with Tracheal Stent Placement performed by Gustavo Mcnulty DO at [facility] OR    CHOLECYSTECTOMY, LAPAROSCOPIC      CHOLECYSTECTOMY, LAPAROSCOPIC      LARYNGOSCOPY N/A 01/06/2025    LARYNGOSCOPY/TRACHEOSCOPY performed by Jeremy Meyers MD at [facility] OR    LUNG REMOVAL, TOTAL N/A     NECK SURGERY      TESTICLE SURGERY          MEDICATIONS:  Current

## 2025-02-17 NOTE — PROGRESS NOTES
Occupational Therapy  [facility]  INPATIENT OCCUPATIONAL THERAPY  [facility] ICU STEPDOWN TELEMETRY 4K  EVALUATION      Discharge Recommendations: Home with Home health OT  Equipment Recommendations: No Pt would benefit from meals on wheels and/or a home health aide    Time In: 1022  Time Out: 1054  Timed Code Treatment Minutes: 24 Minutes  Minutes: 32          Date: 2025  Patient Name: Vladimir Muñiz,   Gender: male      MRN: [id number]  : 1950  (74 y.o.)  Referring Practitioner: Behl, Ashita, MD  Diagnosis: Pneumonia of both lower lobes due to infectious organism  Additional Pertinent Hx: 74 y.o. male with PMHx of tracheomalacia, HTN, HLD, restless leg syndrome, anxiety who presents to [facility] with chronic cough.  Patient reported ongoing since the previous stenting surgery done for tracheomalacia.  Patient had a follow-up visit with Dr. Meyers, and reported his dyspnea has been improving.  Patient was supposed to schedule a follow-up appointment for possible bronchoscopy and further evaluation, which patient has been unable to schedule.  Patient also reported trouble swallowing, ongoing for past couple of months and total weight loss of 20 pounds.  Patient also reports feeling lonely at home, unable to cook food for himself lost interest in things and also loss of appetite.  Patient reported that he wanted someone to hear about his pain and chronic cough and that's another reason he presented to the hospital. Pt endorses feelings of suicidal ideation.    Restrictions/Precautions:  Restrictions/Precautions: General Precautions, Fall Risk  Position Activity Restriction  Other Position/Activity Restrictions: suicide precautions    Subjective  Chart Reviewed: Yes, Orders, Progress Notes, History and Physical  Patient assessed for rehabilitation services?: Yes  Family / Caregiver Present: No       Pain: 0/10:     Vitals: Vitals not assessed per clinical judgement, see nursing  Tolerance:  Patient tolerance of  treatment: Fair treatment tolerance and Limited by fatigue      Functional Outcome Measures:   AM-PAC Inpatient Daily Activity Raw Score: 20     Modified [place]:  Premorbid Functional Status: Not Applicable  Current Functional Status:  Not Applicable    Assessment:  Assessment: 73 yo admitted d/t devyn PNA. Pt presents to OT with decreased endurance, balance, and activity tolerance impacting safety and IND with higher level ADLs and IADLs. Pt would benefit from further skilled OT to address barriers in order to return to PLOF. Without OT, pt is at risk for falls, hospital readmissions, and increased caregiver burden.    Performance deficits / Impairments: Decreased functional mobility , Decreased ADL status, Decreased strength, Decreased high-level IADLs, Decreased safe awareness, Decreased balance, Decreased endurance  Prognosis: Fair  REQUIRES OT FOLLOW-UP: Yes  Decision Making: Medium Complexity    Treatment Initiated: Treatment and education initiated within context of evaluation.  Evaluation time included review of current medical information, gathering information related to past medical, social and functional history, completion of standardized testing, formal and informal observation of tasks, assessment of data and development of plan of care and goals.  Treatment time included skilled education and facilitation of tasks to increase safety and independence with ADL's for improved functional independence and quality of life.    Patient Education:          Patient Education  Education Given To: Patient  Education Provided: Role of Therapy;Plan of Care;ADL Adaptive Strategies;Fall Prevention Strategies  Education Method: Demonstration;Verbal  Barriers to Learning: Readiness to Learn  Education Outcome: Verbalized understanding    Plan:  Times Per Week: 5x  Current Treatment Recommendations: Strengthening, Balance training, Functional mobility training, Endurance training,

## 2025-02-17 NOTE — PROGRESS NOTES
Spiritual Health History and Assessment/Progress Note  [facility]    (P) Crisis, (P) Emotional distress,  ,      Name: Vladimir Muñiz MRN: [id number]    Age: 74 y.o.     Sex: male   Language: English   Lutheran: Adventism   Pneumonia of both lower lobes due to infectious organism     Date: 2/17/2025            Total Time Calculated: (P) 25 min              Spiritual Assessment continued in [facility] ICU STEPDOWN TELEMETRY 4K        Referral/Consult From: (P) Multi-disciplinary team   Encounter Overview/Reason: (P) Crisis  Service Provided For: (P) Patient    Awa, Belief, Meaning:   Patient has beliefs or practices that help with coping during difficult times  Family/Friends No family/friends present      Importance and Influence:  Patient has spiritual/personal beliefs that influence decisions regarding their health  Family/Friends No family/friends present    Community:  Patient expresses feelings of isolation: Other: Feels isolated from God in that, emotional distress source not sure God is with him or hears him.  Family/Friends No family/friends present    Assessment and Plan of Care:   Pt is a 74y.o. male, propped up in bed resting, in 4K-23. Vladimir explained throat issues he has been experiencing for some time, along with pneumonia he has contracted. He shared some unresolved grief from the passing of his wife-8 years ago-and feels that God is not with him any longer or listens to him. Through conversation and discussion on his awa background,  provided clarification on the grief process and the nature of God-met with repeated gratitude by Vladimir.    Patient Interventions include: Facilitated expression of thoughts and feelings, Explored spiritual coping/struggle/distress, Engaged in theological reflection, and Other:  provided words of encouragement and prayer-met with gratitude by Vladimir.  Family/Friends Interventions include: No family/friends present    Patient Plan of

## 2025-02-17 NOTE — PROCEDURES
PROCEDURE NOTE  Date: 2025   Name: Vladimir Muñiz  YOB: 1950    Procedures      [facility] Endoscopy     EGD Report    Patient: Vladimir Muñiz  : 1950  Acct#: [id number]     BRIEF HISTORY AND INDICATIONS:    The patient is a 74 y.o.,  male with significant past medical history of tracheomalacia, s/p stent placement in bronchus, with dysphagia. Got SLP evaluation yesterday.  Reversed DNR status for purpose of procedure.     odynophagia, dysphagia, and weight loss. Patient presented to the ED 2/15/25 due to cough.  Patient had a bronchoscopy in January due to tracheobronchomalacia and had a tracheal stent placed.  He had an appt to be evaluated by Dr. Meyers 25 with a procedure on 3/6/25.  Reported increasing shortness of breath with exertion and unable to keep food down with epigastric pain that feels like heartburn.  Patient also reports orodynophagia, dysphagia, and unintentional weight loss.  Patient diagnosed with community acquired pneumonia of bilateral lungs.  PMHx significant for depression, HTN, HLD, Cardiac arrhythmia?, RLS on gabapentin, ROHIT on CPAP at night, Anxiety, GERD on protonix daily, and neurogenic bladder/BPH.  Patient reports he has had trouble swallowing for months.  He reports that he struggles if foods aren't the right temperature or if they don't taste good.  He also reports sometimes food will get stuck- he denies having to vomit to bring food up but reports he has to drink water and orange juice- because of this he doesn't eat while his daughter is gone because it scares him and he can't catch his breath when it happens.  He denies any issues with liquids, saliva, or soft foods like pudding and jello.  He does report pills getting stuck and points to middle of his neck where they get stuck.    The SLP test was positive for oropharyngeal dysphagia.    PREMEDICATION:  TIVA anesthesia was used, see Anesthesia note for details.    INSTRUMENT:

## 2025-02-17 NOTE — PROGRESS NOTES
[facility]  INPATIENT PHYSICAL THERAPY  EVALUATION  STRZ ICU STEPDOWN TELEMETRY 4K - 4K-23/023-A    Discharge Recommendations: Continue to assess pending progress, Home with Home health PT  Equipment Recommendations: No             Time In: 835  Time Out: 0855  Timed Code Treatment Minutes: 12 Minutes  Minutes: 20          Date: 2025  Patient Name: Vladimir Muñiz,  Gender:  male        MRN: [id number]  : 1950  (74 y.o.)      Referring Practitioner: Behl, Ashita, MD  Diagnosis: Pneumonia of both lower lobes due to infectious organism  Additional Pertinent Hx: Per EMR \"Vladimir Muñiz is a 74 y.o. male with PMHx of tracheomalacia, HTN, HLD, restless leg syndrome, anxiety who presents to [facility] with chronic cough.  Patient reported ongoing since the previous stenting surgery done for tracheomalacia.  Patient had a follow-up visit with Dr. Meyers, and reported his dyspnea has been improving.  Patient was supposed to schedule a follow-up appointment for possible bronchoscopy and further evaluation, which patient has been unable to schedule.  Patient also reported trouble swallowing, ongoing for past couple of months and total weight loss of 20 pounds.  Patient also reports feeling lonely at home, unable to cook food for himself lost interest in things and also loss of appetite.  Patient reported that he wanted someone to hear about his pain and chronic cough and that's another reason he presented to the hospital.  Patient denies fever/chills, nausea/vomiting, pain, urinary symptoms, constipation/diarrhea, chest pain, chest palpitation. \"     Restrictions/Precautions:  Restrictions/Precautions: General Precautions, Fall Risk       Other Position/Activity Restrictions: suicide precautions, [place]    Required Braces or Orthoses?: No      Subjective:  Chart Reviewed: Yes  Patient assessed for rehabilitation services?: Yes  Family/Caregiver Present: No  Subjective: OK to see pt per

## 2025-02-17 NOTE — PROGRESS NOTES
Patient received sacramental anointing by a . If you are in need of  support, please call [phone]. If you are in need of a  after 6:30pm, please call the house supervisor for the on-call .      Tesha Cordero  [facility] Health Coordinator  [phone]

## 2025-02-17 NOTE — PLAN OF CARE
Problem: Self Harm/Suicidality  Goal: Will have no self-injury during hospital stay  Description: INTERVENTIONS:  1.  Ensure constant observer at bedside with Q15M safety checks  2.  Maintain a safe environment  3.  Secure patient belongings  4.  Ensure family/visitors adhere to safety recommendations  5.  Ensure safety tray has been added to patient's diet order  6.  Every shift and PRN: Re-assess suicidal risk via Frequent Screener    Outcome: Progressing  Flowsheets (Taken 2/17/2025 3257)  Will have no self-injury during hospital stay:   Ensure constant observer at bedside with Q15M safety checks   Ensure safety tray has been added to patient's diet order   Maintain a safe environment   Secure patient belongings

## 2025-02-17 NOTE — PLAN OF CARE
Problem: Discharge Planning  Goal: Discharge to home or other facility with appropriate resources  Flowsheets (Taken 2/17/2025 0645)  Discharge to home or other facility with appropriate resources:   Identify barriers to discharge with patient and caregiver   Identify discharge learning needs (meds, wound care, etc)     Problem: Safety - Adult  Goal: Free from fall injury  Flowsheets (Taken 2/17/2025 0645)  Free From Fall Injury: Instruct family/caregiver on patient safety     Problem: Pain  Goal: Verbalizes/displays adequate comfort level or baseline comfort level  Flowsheets (Taken 2/17/2025 0645)  Verbalizes/displays adequate comfort level or baseline comfort level:   Encourage patient to monitor pain and request assistance   Administer analgesics based on type and severity of pain and evaluate response   Consider cultural and social influences on pain and pain management   Assess pain using appropriate pain scale   Implement non-pharmacological measures as appropriate and evaluate response     Problem: Respiratory - Adult  Goal: Achieves optimal ventilation and oxygenation  Flowsheets (Taken 2/17/2025 0645)  Achieves optimal ventilation and oxygenation:   Assess for changes in respiratory status   Position to facilitate oxygenation and minimize respiratory effort   Assess for changes in mentation and behavior   Encourage broncho-pulmonary hygiene including cough, deep breathe, incentive spirometry   Assess and instruct to report shortness of breath or any respiratory difficulty

## 2025-02-17 NOTE — PROGRESS NOTES
Hospitalist Progress Note  Internal Medicine Resident      Patient: Vladimir Muñiz 74 y.o. male      Unit/Bed: 4K-23/023-A    Admit Date: 2/15/2025      ASSESSMENT AND PLAN  Active Problems  Community-acquired pneumonia: CT chest without contrast noted to bilateral groundglass/consolidation.  WBC on arrival 12.2, now 11.5. Lactic acidosis resolved. Influenza/COVID-negative. S/p 1 dose of azithromycin and ceftriaxone in ED.   Continue antibiotics Zithromax 250mg IV daily, Rocephin 1G daily 2/16-2/20.   BC 1 and 2 NGTD # 1, MRSA nares negative, pending resp cultures.  Daily labs.      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  Had a follow-up with Dr. Meyers, postsurgery noted to be improving with dyspnea. ENT consulted for further recommendations, which are to continue CAP treatment and contact Dr. Acosta if patient further escalated dysphagia.  CT chest 2/15/2025 bilateral lower lobe pulmonary opacities concerning for bibasilar pneumonia with tracheobronchial stent in unchanged position.  Continue 3% saline nebs+ Albuterol PRN+ Tessalon.  ENT with no change in plan; okay to swallow per their evaluation. Still pending SLP eval  GI consulted for endoscopy per dysphagia, noted below.     Nonspecific chest pain: trops, EKG RBBB. Trops 40's. Cardiology consulted, to evaluate. Previously on sotalol/prasugrel. Holding both at this time.  Continuous telemetry.  Keep K >4, Mg >2.     Dysphagia: 2/16 patient failed morning nursing bedside swallow. SLP consult, awaiting eval. Per ENT, patient is okay for diet. GI consulted for EGD. SLP to pursue MBS. EGD with biopsies, balloon dilatation completed 2/17/2025.  Pending biopsy results.  Also ordered CT soft tissue neck.  Pending results.  Diet orders clear liquid without red dye through 2/18 midnight.     Hypoglycemia ISO NPO diet with starvation ketosis,

## 2025-02-17 NOTE — PROGRESS NOTES
[facility]  SPEECH THERAPY  STRZ ICU STEPDOWN TELEMETRY 4K  Clinical Swallow Evaluation    Discharge Recommendations: Home with Home Exercise Program  DIET ORDER RECOMMENDATIONS AFTER EVALUATION: Soft & bite-sized diet/thin liquids   Strategies:  Full Upright Position, Small Bite/Sip, Pulmonary Monitoring, Medications Whole with Thin, Alternate Solids and Liquids, Limit Distractions, and Monitor for Fatigue     SLP Individual Minutes  Time In: 1503  Time Out: 1527  Minutes: 24  Timed Code Treatment Minutes: 0 Minutes       Date: 2025  Patient Name: Vladimir Muñiz      CSN: [id number]   : 1950  (74 y.o.)  Gender: male   Referring Physician:  Tiago Lainez DO    Diagnosis: Pneumonia of both lower lobes due to infectious organism    History of Present Illness/Injury: Patient presented to [facility] with the above medical dx. Refer to physician H&P: \"a 74 y.o. male with PMHx of tracheomalacia, HTN, HLD, restless leg syndrome, anxiety who presents to [facility] with chronic cough.  Patient reported ongoing since the previous stenting surgery done for tracheomalacia.  Patient had a follow-up visit with Dr. Meyers, and reported his dyspnea has been improving.  Patient was supposed to schedule a follow-up appointment for possible bronchoscopy and further evaluation, which patient has been unable to schedule.  Patient also reported trouble swallowing, ongoing for past couple of months and total weight loss of 20 pounds.  Patient also reports feeling lonely at home, unable to cook food for himself lost interest in things and also loss of appetite.  Patient reported that he wanted someone to hear about his pain and chronic cough and that's another reason he presented to the hospital.  Patient denies fever/chills, nausea/vomiting, pain, urinary symptoms, constipation/diarrhea, chest pain, chest palpitation.\"    CT Chest 02/15/2025:  IMPRESSION:  Impression:  1. Bilateral

## 2025-02-17 NOTE — CONSULTS
Department of Psychiatry   Psychiatric Assessment      Thank you very much for allowing us to participate in the care of this patient.      Reason for Consult:  Depression    HISTORY OF PRESENT ILLNESS:          The patient is a 74 y.o. male with significant history of depression who is admitted medically for concerns of chronic cough and pneumonia.  Psychiatry is consulted to evaluate for depression.  Patient described at length on how he has been dealing with worsening depression since his wife passed away around 8 years ago.  Reports that he has been dealing with multiple medical issues including pain and immobility which have been causing a significant impact in his day-to-day activities.  Reports that this has been causing significant low mood and anhedonia.  Reports feeling helpless hopeless and worthless.  Reports having passive thoughts of suicide wishing he was dead.  Denies any active intent or plan to act on these thoughts.  Mentions that his daughter is somewhat supportive of him however continues to deal with significant loneliness when she is not around.  Reports having very poor appetite and had over 20 pounds of unintentional weight loss in the last several months.  Reports very poor sleep.  Mentions that his wife used to cook for him and he misses that now.  Explored a lot of cognitive distortions and grief around loss of his wife that he did not deal with in the past.  Could not elicit any manic or hypomanic symptoms.  Could not elicit any psychotic symptoms today.    PSYCHIATRIC HISTORY:  Mentions he dealt with significant depression in his life.  Denies any suicide attempts or inpatient psychiatric hospitalizations in the past.    Lifetime Psychiatric Review of Systems         Obsessions and Compulsions: Denies       Brandee or Hypomania: Denies     Hallucinations: Denies     Panic Attacks:  Denies     Delusions:  Denies     Phobias:  Denies     Trauma: Denies    Social History:    Currently lives

## 2025-02-17 NOTE — PROCEDURES
PROCEDURE NOTE  Date: 2/17/2025   Name: Vladimir Muñiz  YOB: 1950    Procedures  EKG completed, given to Tesha PEREA

## 2025-02-18 ENCOUNTER — ANESTHESIA EVENT (OUTPATIENT)
Dept: ENDOSCOPY | Age: 75
End: 2025-02-18
Payer: COMMERCIAL

## 2025-02-18 ENCOUNTER — HOSPITAL ENCOUNTER (INPATIENT)
Age: 75
Discharge: HOME | End: 2025-02-20
Payer: COMMERCIAL

## 2025-02-18 ENCOUNTER — APPOINTMENT (OUTPATIENT)
Dept: NUCLEAR MEDICINE | Age: 75
End: 2025-02-18
Payer: COMMERCIAL

## 2025-02-18 ENCOUNTER — ANESTHESIA (OUTPATIENT)
Dept: ENDOSCOPY | Age: 75
End: 2025-02-18
Payer: COMMERCIAL

## 2025-02-18 ENCOUNTER — APPOINTMENT (OUTPATIENT)
Dept: GENERAL RADIOLOGY | Age: 75
End: 2025-02-18
Payer: COMMERCIAL

## 2025-02-18 ENCOUNTER — APPOINTMENT (OUTPATIENT)
Age: 75
End: 2025-02-18
Attending: STUDENT IN AN ORGANIZED HEALTH CARE EDUCATION/TRAINING PROGRAM
Payer: COMMERCIAL

## 2025-02-18 ENCOUNTER — APPOINTMENT (OUTPATIENT)
Dept: ENDOSCOPY | Age: 75
End: 2025-02-18
Attending: INTERNAL MEDICINE
Payer: COMMERCIAL

## 2025-02-18 LAB
ANION GAP SERPL CALC-SCNC: 11 MEQ/L (ref 8–16)
BASOPHILS ABSOLUTE: 0 THOU/MM3 (ref 0–0.1)
BASOPHILS NFR BLD AUTO: 0.4 %
BUN SERPL-MCNC: 14 MG/DL (ref 7–22)
CALCIUM SERPL-MCNC: 8.2 MG/DL (ref 8.5–10.5)
CHLORIDE SERPL-SCNC: 104 MEQ/L (ref 98–111)
CO2 SERPL-SCNC: 23 MEQ/L (ref 23–33)
CREAT SERPL-MCNC: 0.7 MG/DL (ref 0.4–1.2)
DEPRECATED RDW RBC AUTO: 44.8 FL (ref 35–45)
ECHO AO ASC DIAM: 3.7 CM
ECHO AO ASCENDING AORTA INDEX: 1.95 CM/M2
ECHO AR MAX VEL PISA: 3.8 M/S
ECHO AV CUSP MM: 1.4 CM
ECHO AV MEAN GRADIENT: 5 MMHG
ECHO AV MEAN VELOCITY: 1 M/S
ECHO AV PEAK GRADIENT: 9 MMHG
ECHO AV PEAK VELOCITY: 1.5 M/S
ECHO AV REGURGITANT PHT: 358 MS
ECHO AV VELOCITY RATIO: 0.87
ECHO AV VTI: 29.1 CM
ECHO BSA: 1.91 M2
ECHO BSA: 1.91 M2
ECHO LA AREA 2C: 11.3 CM2
ECHO LA AREA 4C: 9.8 CM2
ECHO LA DIAMETER INDEX: 1.74 CM/M2
ECHO LA DIAMETER: 3.3 CM
ECHO LA MAJOR AXIS: 5 CM
ECHO LA MINOR AXIS: 4.2 CM
ECHO LA VOL BP: 21 ML (ref 18–58)
ECHO LA VOL MOD A2C: 25 ML (ref 18–58)
ECHO LA VOL MOD A4C: 15 ML (ref 18–58)
ECHO LA VOL/BSA BIPLANE: 11 ML/M2 (ref 16–34)
ECHO LA VOLUME INDEX MOD A2C: 13 ML/M2 (ref 16–34)
ECHO LA VOLUME INDEX MOD A4C: 8 ML/M2 (ref 16–34)
ECHO LV E' LATERAL VELOCITY: 7.8 CM/S
ECHO LV E' SEPTAL VELOCITY: 6.2 CM/S
ECHO LV EDV A2C: 73 ML
ECHO LV EDV A4C: 65 ML
ECHO LV EDV INDEX A4C: 34 ML/M2
ECHO LV EDV NDEX A2C: 38 ML/M2
ECHO LV EF PHYSICIAN: 60 %
ECHO LV EJECTION FRACTION A2C: 58 %
ECHO LV EJECTION FRACTION A4C: 60 %
ECHO LV EJECTION FRACTION BIPLANE: 59 % (ref 55–100)
ECHO LV ESV A2C: 31 ML
ECHO LV ESV A4C: 26 ML
ECHO LV ESV INDEX A2C: 16 ML/M2
ECHO LV ESV INDEX A4C: 14 ML/M2
ECHO LV FRACTIONAL SHORTENING: 33 % (ref 28–44)
ECHO LV INTERNAL DIMENSION DIASTOLE INDEX: 2.42 CM/M2
ECHO LV INTERNAL DIMENSION DIASTOLIC: 4.6 CM (ref 4.2–5.9)
ECHO LV INTERNAL DIMENSION SYSTOLIC INDEX: 1.63 CM/M2
ECHO LV INTERNAL DIMENSION SYSTOLIC: 3.1 CM
ECHO LV ISOVOLUMETRIC RELAXATION TIME (IVRT): 109 MS
ECHO LV IVSD: 0.8 CM (ref 0.6–1)
ECHO LV MASS 2D: 117.9 G (ref 88–224)
ECHO LV MASS INDEX 2D: 62.1 G/M2 (ref 49–115)
ECHO LV POSTERIOR WALL DIASTOLIC: 0.8 CM (ref 0.6–1)
ECHO LV RELATIVE WALL THICKNESS RATIO: 0.35
ECHO LVOT AV VTI INDEX: 0.93
ECHO LVOT MEAN GRADIENT: 3 MMHG
ECHO LVOT PEAK GRADIENT: 7 MMHG
ECHO LVOT PEAK VELOCITY: 1.3 M/S
ECHO LVOT VTI: 27.2 CM
ECHO MV A VELOCITY: 1.11 M/S
ECHO MV E DECELERATION TIME (DT): 136 MS
ECHO MV E VELOCITY: 0.92 M/S
ECHO MV E/A RATIO: 0.83
ECHO MV E/E' LATERAL: 11.79
ECHO MV E/E' RATIO (AVERAGED): 13.32
ECHO MV E/E' SEPTAL: 14.84
ECHO PULMONARY ARTERY END DIASTOLIC PRESSURE: 4 MMHG
ECHO PV MAX VELOCITY: 0.8 M/S
ECHO PV PEAK GRADIENT: 2 MMHG
ECHO PV REGURGITANT MAX VELOCITY: 1 M/S
ECHO RV INTERNAL DIMENSION: 3.1 CM
ECHO RV TAPSE: 2.4 CM (ref 1.7–?)
ECHO TV E WAVE: 0.8 M/S
ECHO TV REGURGITANT MAX VELOCITY: 2.16 M/S
ECHO TV REGURGITANT PEAK GRADIENT: 19 MMHG
EKG ATRIAL RATE: 95 BPM
EKG P AXIS: 50 DEGREES
EKG P-R INTERVAL: 182 MS
EKG Q-T INTERVAL: 378 MS
EKG QRS DURATION: 124 MS
EKG QTC CALCULATION (BAZETT): 475 MS
EKG R AXIS: -42 DEGREES
EKG T AXIS: 12 DEGREES
EKG VENTRICULAR RATE: 95 BPM
EOSINOPHIL NFR BLD AUTO: 2.3 %
EOSINOPHILS ABSOLUTE: 0.2 THOU/MM3 (ref 0–0.4)
ERYTHROCYTE [DISTWIDTH] IN BLOOD BY AUTOMATED COUNT: 13.2 % (ref 11.5–14.5)
GFR SERPL CREATININE-BSD FRML MDRD: > 90 ML/MIN/1.73M2
GLUCOSE BLD STRIP.AUTO-MCNC: 111 MG/DL (ref 70–108)
GLUCOSE BLD STRIP.AUTO-MCNC: 115 MG/DL (ref 70–108)
GLUCOSE BLD STRIP.AUTO-MCNC: 143 MG/DL (ref 70–108)
GLUCOSE BLD STRIP.AUTO-MCNC: 81 MG/DL (ref 70–108)
GLUCOSE BLD STRIP.AUTO-MCNC: 87 MG/DL (ref 70–108)
GLUCOSE SERPL-MCNC: 108 MG/DL (ref 70–108)
HCT VFR BLD AUTO: 35 % (ref 42–52)
HGB BLD-MCNC: 11.1 GM/DL (ref 14–18)
IMM GRANULOCYTES # BLD AUTO: 0.02 THOU/MM3 (ref 0–0.07)
IMM GRANULOCYTES NFR BLD AUTO: 0.3 %
LYMPHOCYTES ABSOLUTE: 1.5 THOU/MM3 (ref 1–4.8)
LYMPHOCYTES NFR BLD AUTO: 19.1 %
MCH RBC QN AUTO: 29.4 PG (ref 26–33)
MCHC RBC AUTO-ENTMCNC: 31.7 GM/DL (ref 32.2–35.5)
MCV RBC AUTO: 92.8 FL (ref 80–94)
MONOCYTES ABSOLUTE: 0.7 THOU/MM3 (ref 0.4–1.3)
MONOCYTES NFR BLD AUTO: 9.1 %
NEUTROPHILS ABSOLUTE: 5.4 THOU/MM3 (ref 1.8–7.7)
NEUTROPHILS NFR BLD AUTO: 68.8 %
NRBC BLD AUTO-RTO: 0 /100 WBC
NUC STRESS EJECTION FRACTION: 64 %
PLATELET # BLD AUTO: 163 THOU/MM3 (ref 130–400)
PMV BLD AUTO: 11 FL (ref 9.4–12.4)
POTASSIUM SERPL-SCNC: 3.9 MEQ/L (ref 3.5–5.2)
RBC # BLD AUTO: 3.77 MILL/MM3 (ref 4.7–6.1)
SODIUM SERPL-SCNC: 138 MEQ/L (ref 135–145)
STRESS BASELINE DIAS BP: 66 MMHG
STRESS BASELINE HR: 80 BPM
STRESS BASELINE SYS BP: 139 MMHG
STRESS ESTIMATED WORKLOAD: 1 METS
STRESS PEAK DIAS BP: 63 MMHG
STRESS PEAK SYS BP: 154 MMHG
STRESS PERCENT HR ACHIEVED: 69 %
STRESS POST PEAK HR: 100 BPM
STRESS RATE PRESSURE PRODUCT: NORMAL BPM*MMHG
STRESS STAGE 1 DURATION: 1 MIN:SEC
STRESS STAGE 1 HR: 88 BPM
STRESS STAGE 2 BP: NORMAL MMHG
STRESS STAGE 2 DURATION: 1 MIN:SEC
STRESS STAGE 2 HR: 100 BPM
STRESS STAGE 3 BP: NORMAL MMHG
STRESS STAGE 3 DURATION: 1 MIN:SEC
STRESS STAGE 3 HR: 100 BPM
STRESS STAGE RECOVERY 1 BP: NORMAL MMHG
STRESS STAGE RECOVERY 1 DURATION: 1 MIN:SEC
STRESS STAGE RECOVERY 1 HR: 95 BPM
STRESS STAGE RECOVERY 2 BP: NORMAL MMHG
STRESS STAGE RECOVERY 2 DURATION: 1 MIN:SEC
STRESS STAGE RECOVERY 2 HR: 93 BPM
STRESS STAGE RECOVERY 3 BP: NORMAL MMHG
STRESS STAGE RECOVERY 3 DURATION: 1 MIN:SEC
STRESS STAGE RECOVERY 3 HR: 93 BPM
STRESS STAGE RECOVERY 4 BP: NORMAL MMHG
STRESS STAGE RECOVERY 4 DURATION: 2 MIN:SEC
STRESS STAGE RECOVERY 4 HR: 88 BPM
STRESS TARGET HR: 145 BPM
T4 FREE SERPL-MCNC: 1.31 NG/DL (ref 0.92–1.68)
TID: 1.13
TSH SERPL DL<=0.005 MIU/L-ACNC: 2.31 UIU/ML (ref 0.4–4.2)
WBC # BLD AUTO: 7.9 THOU/MM3 (ref 4.8–10.8)

## 2025-02-18 PROCEDURE — A9500 TC99M SESTAMIBI: HCPCS | Performed by: INTERNAL MEDICINE

## 2025-02-18 PROCEDURE — 2700000000 HC OXYGEN THERAPY PER DAY

## 2025-02-18 PROCEDURE — 93018 CV STRESS TEST I&R ONLY: CPT | Performed by: INTERNAL MEDICINE

## 2025-02-18 PROCEDURE — 2060000000 HC ICU INTERMEDIATE R&B

## 2025-02-18 PROCEDURE — 94761 N-INVAS EAR/PLS OXIMETRY MLT: CPT

## 2025-02-18 PROCEDURE — 82948 REAGENT STRIP/BLOOD GLUCOSE: CPT

## 2025-02-18 PROCEDURE — 80048 BASIC METABOLIC PNL TOTAL CA: CPT

## 2025-02-18 PROCEDURE — 93017 CV STRESS TEST TRACING ONLY: CPT

## 2025-02-18 PROCEDURE — 7100000011 HC PHASE II RECOVERY - ADDTL 15 MIN: Performed by: INTERNAL MEDICINE

## 2025-02-18 PROCEDURE — 2500000003 HC RX 250 WO HCPCS

## 2025-02-18 PROCEDURE — 6360000002 HC RX W HCPCS: Performed by: STUDENT IN AN ORGANIZED HEALTH CARE EDUCATION/TRAINING PROGRAM

## 2025-02-18 PROCEDURE — 3430000000 HC RX DIAGNOSTIC RADIOPHARMACEUTICAL: Performed by: INTERNAL MEDICINE

## 2025-02-18 PROCEDURE — 93016 CV STRESS TEST SUPVJ ONLY: CPT | Performed by: INTERNAL MEDICINE

## 2025-02-18 PROCEDURE — 2580000003 HC RX 258

## 2025-02-18 PROCEDURE — 93005 ELECTROCARDIOGRAM TRACING: CPT | Performed by: STUDENT IN AN ORGANIZED HEALTH CARE EDUCATION/TRAINING PROGRAM

## 2025-02-18 PROCEDURE — 7100000010 HC PHASE II RECOVERY - FIRST 15 MIN: Performed by: INTERNAL MEDICINE

## 2025-02-18 PROCEDURE — 99233 SBSQ HOSP IP/OBS HIGH 50: CPT | Performed by: STUDENT IN AN ORGANIZED HEALTH CARE EDUCATION/TRAINING PROGRAM

## 2025-02-18 PROCEDURE — 94660 CPAP INITIATION&MGMT: CPT

## 2025-02-18 PROCEDURE — 2580000003 HC RX 258: Performed by: STUDENT IN AN ORGANIZED HEALTH CARE EDUCATION/TRAINING PROGRAM

## 2025-02-18 PROCEDURE — 6360000002 HC RX W HCPCS

## 2025-02-18 PROCEDURE — 3700000001 HC ADD 15 MINUTES (ANESTHESIA): Performed by: INTERNAL MEDICINE

## 2025-02-18 PROCEDURE — 6370000000 HC RX 637 (ALT 250 FOR IP)

## 2025-02-18 PROCEDURE — 85025 COMPLETE CBC W/AUTO DIFF WBC: CPT

## 2025-02-18 PROCEDURE — 97530 THERAPEUTIC ACTIVITIES: CPT

## 2025-02-18 PROCEDURE — 93306 TTE W/DOPPLER COMPLETE: CPT

## 2025-02-18 PROCEDURE — 36415 COLL VENOUS BLD VENIPUNCTURE: CPT

## 2025-02-18 PROCEDURE — 84443 ASSAY THYROID STIM HORMONE: CPT

## 2025-02-18 PROCEDURE — 78452 HT MUSCLE IMAGE SPECT MULT: CPT

## 2025-02-18 PROCEDURE — 97116 GAIT TRAINING THERAPY: CPT

## 2025-02-18 PROCEDURE — 3609017700 HC EGD DILATION GASTRIC/DUODENAL STRICTURE: Performed by: INTERNAL MEDICINE

## 2025-02-18 PROCEDURE — 78452 HT MUSCLE IMAGE SPECT MULT: CPT | Performed by: INTERNAL MEDICINE

## 2025-02-18 PROCEDURE — 3700000000 HC ANESTHESIA ATTENDED CARE: Performed by: INTERNAL MEDICINE

## 2025-02-18 PROCEDURE — 0D718ZZ DILATION OF UPPER ESOPHAGUS, VIA NATURAL OR ARTIFICIAL OPENING ENDOSCOPIC: ICD-10-PCS | Performed by: INTERNAL MEDICINE

## 2025-02-18 PROCEDURE — 6360000002 HC RX W HCPCS: Performed by: INTERNAL MEDICINE

## 2025-02-18 PROCEDURE — 84439 ASSAY OF FREE THYROXINE: CPT

## 2025-02-18 PROCEDURE — 6370000000 HC RX 637 (ALT 250 FOR IP): Performed by: STUDENT IN AN ORGANIZED HEALTH CARE EDUCATION/TRAINING PROGRAM

## 2025-02-18 PROCEDURE — 93306 TTE W/DOPPLER COMPLETE: CPT | Performed by: INTERNAL MEDICINE

## 2025-02-18 PROCEDURE — 93010 ELECTROCARDIOGRAM REPORT: CPT | Performed by: INTERNAL MEDICINE

## 2025-02-18 RX ORDER — TETRAKIS(2-METHOXYISOBUTYLISOCYANIDE)COPPER(I) TETRAFLUOROBORATE 1 MG/ML
34.6 INJECTION, POWDER, LYOPHILIZED, FOR SOLUTION INTRAVENOUS
Status: COMPLETED | OUTPATIENT
Start: 2025-02-18 | End: 2025-02-18

## 2025-02-18 RX ORDER — REGADENOSON 0.08 MG/ML
0.4 INJECTION, SOLUTION INTRAVENOUS
Status: COMPLETED | OUTPATIENT
Start: 2025-02-18 | End: 2025-02-18

## 2025-02-18 RX ORDER — SODIUM CHLORIDE 9 MG/ML
INJECTION, SOLUTION INTRAVENOUS
Status: DISCONTINUED | OUTPATIENT
Start: 2025-02-18 | End: 2025-02-18 | Stop reason: SDUPTHER

## 2025-02-18 RX ORDER — ASPIRIN 81 MG/1
81 TABLET, CHEWABLE ORAL DAILY
Status: DISCONTINUED | OUTPATIENT
Start: 2025-02-18 | End: 2025-02-21 | Stop reason: HOSPADM

## 2025-02-18 RX ORDER — TETRAKIS(2-METHOXYISOBUTYLISOCYANIDE)COPPER(I) TETRAFLUOROBORATE 1 MG/ML
10.4 INJECTION, POWDER, LYOPHILIZED, FOR SOLUTION INTRAVENOUS
Status: COMPLETED | OUTPATIENT
Start: 2025-02-18 | End: 2025-02-18

## 2025-02-18 RX ORDER — GUAIFENESIN 600 MG/1
600 TABLET, EXTENDED RELEASE ORAL 2 TIMES DAILY
Status: DISCONTINUED | OUTPATIENT
Start: 2025-02-18 | End: 2025-02-21 | Stop reason: HOSPADM

## 2025-02-18 RX ORDER — PROPOFOL 10 MG/ML
INJECTION, EMULSION INTRAVENOUS
Status: DISCONTINUED | OUTPATIENT
Start: 2025-02-18 | End: 2025-02-18 | Stop reason: SDUPTHER

## 2025-02-18 RX ADMIN — GABAPENTIN 400 MG: 400 CAPSULE ORAL at 09:30

## 2025-02-18 RX ADMIN — TAMSULOSIN HYDROCHLORIDE 0.4 MG: 0.4 CAPSULE ORAL at 19:56

## 2025-02-18 RX ADMIN — ENOXAPARIN SODIUM 40 MG: 100 INJECTION SUBCUTANEOUS at 15:19

## 2025-02-18 RX ADMIN — MIRTAZAPINE 7.5 MG: 7.5 TABLET, FILM COATED ORAL at 19:56

## 2025-02-18 RX ADMIN — ISOSORBIDE MONONITRATE 60 MG: 60 TABLET, EXTENDED RELEASE ORAL at 09:30

## 2025-02-18 RX ADMIN — Medication 34.6 MILLICURIE: at 08:25

## 2025-02-18 RX ADMIN — TAMSULOSIN HYDROCHLORIDE 0.4 MG: 0.4 CAPSULE ORAL at 09:30

## 2025-02-18 RX ADMIN — DEXTROSE AND SODIUM CHLORIDE: 5; .45 INJECTION, SOLUTION INTRAVENOUS at 16:57

## 2025-02-18 RX ADMIN — PROPOFOL 50 MG: 10 INJECTION, EMULSION INTRAVENOUS at 14:12

## 2025-02-18 RX ADMIN — EZETIMIBE 10 MG: 10 TABLET ORAL at 09:30

## 2025-02-18 RX ADMIN — SODIUM CHLORIDE, PRESERVATIVE FREE 10 ML: 5 INJECTION INTRAVENOUS at 09:31

## 2025-02-18 RX ADMIN — WATER 1000 MG: 1 INJECTION INTRAMUSCULAR; INTRAVENOUS; SUBCUTANEOUS at 00:10

## 2025-02-18 RX ADMIN — OXYBUTYNIN CHLORIDE 5 MG: 5 TABLET, EXTENDED RELEASE ORAL at 09:30

## 2025-02-18 RX ADMIN — DEXTROSE AND SODIUM CHLORIDE: 5; .45 INJECTION, SOLUTION INTRAVENOUS at 00:15

## 2025-02-18 RX ADMIN — Medication 10.4 MILLICURIE: at 07:30

## 2025-02-18 RX ADMIN — LISINOPRIL 40 MG: 40 TABLET ORAL at 09:30

## 2025-02-18 RX ADMIN — AMLODIPINE BESYLATE 5 MG: 5 TABLET ORAL at 09:31

## 2025-02-18 RX ADMIN — PANTOPRAZOLE SODIUM 40 MG: 40 TABLET, DELAYED RELEASE ORAL at 04:44

## 2025-02-18 RX ADMIN — SODIUM CHLORIDE: 9 INJECTION, SOLUTION INTRAVENOUS at 14:06

## 2025-02-18 RX ADMIN — GABAPENTIN 400 MG: 400 CAPSULE ORAL at 15:20

## 2025-02-18 RX ADMIN — GABAPENTIN 400 MG: 400 CAPSULE ORAL at 19:56

## 2025-02-18 RX ADMIN — PROPOFOL 50 MG: 10 INJECTION, EMULSION INTRAVENOUS at 14:17

## 2025-02-18 RX ADMIN — AZITHROMYCIN MONOHYDRATE 250 MG: 500 INJECTION, POWDER, LYOPHILIZED, FOR SOLUTION INTRAVENOUS at 00:17

## 2025-02-18 RX ADMIN — REGADENOSON 0.4 MG: 0.08 INJECTION, SOLUTION INTRAVENOUS at 08:21

## 2025-02-18 RX ADMIN — PRAVASTATIN SODIUM 40 MG: 40 TABLET ORAL at 19:56

## 2025-02-18 ASSESSMENT — ENCOUNTER SYMPTOMS: SHORTNESS OF BREATH: 1

## 2025-02-18 ASSESSMENT — PAIN - FUNCTIONAL ASSESSMENT
PAIN_FUNCTIONAL_ASSESSMENT: NONE - DENIES PAIN
PAIN_FUNCTIONAL_ASSESSMENT: NONE - DENIES PAIN
PAIN_FUNCTIONAL_ASSESSMENT: 0-10

## 2025-02-18 ASSESSMENT — PAIN SCALES - GENERAL
PAINLEVEL_OUTOF10: 0
PAINLEVEL_OUTOF10: 0

## 2025-02-18 NOTE — PROGRESS NOTES
Hospitalist Progress Note  Internal Medicine Resident      Patient: Vladimir Muñiz 74 y.o. male      Unit/Bed: 4K-23/023-A    Admit Date: 2/15/2025      ASSESSMENT AND PLAN  Active Problems  Community-acquired pneumonia: CT chest without contrast noted to bilateral groundglass/consolidation.  WBC on arrival 12.2, now 11.5. Lactic acidosis resolved. Influenza/COVID-negative. S/p 1 dose of azithromycin and ceftriaxone in ED.   Continue antibiotics Zithromax 250mg IV daily, Rocephin 1G daily 2/16-2/20.   BC 1 and 2 NGTD # 1, MRSA nares negative, pending resp cultures.  Daily labs.      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  Had a follow-up with Dr. Meyers, postsurgery noted to be improving with dyspnea. ENT consulted for further recommendations, which are to continue CAP treatment and contact Dr. Acosta if patient further escalated dysphagia.  CT chest 2/15/2025 bilateral lower lobe pulmonary opacities concerning for bibasilar pneumonia with tracheobronchial stent in unchanged position.  Continue 3% saline nebs+ Albuterol PRN+ Tessalon.  ENT with no change in plan; okay to swallow per their evaluation. Still pending SLP eval  GI consulted for endoscopy per dysphagia, noted below.     Heterogenicity left/right lobe of thyroid gland: TSH, free T4 ordered, WNL.  Patient does remain tachycardic 100-102 this afternoon.  On room air saturating mid-high 90s, formerly on PAP 40% FiO2.  Will need to work up outpatient.    Nonspecific chest pain: trops, EKG RBBB. Trops 40's. Previously on sotalol/prasugrel. Holding both at this time.  Cardio consulted, appreciate recommendations.  Plan for echo, nuclear stress test inpatient.  Keep K >4, Mg >2  Continuous telemetry     Dysphagia: 2/16 patient failed morning nursing bedside swallow. SLP consult, awaiting eval. Per ENT, patient is okay for diet. GI  Tazorac Counseling:  Patient advised that medication is irritating and drying.  Patient may need to apply sparingly and wash off after an hour before eventually leaving it on overnight.  The patient verbalized understanding of the proper use and possible adverse effects of tazorac.  All of the patient's questions and concerns were addressed.

## 2025-02-18 NOTE — CONSULTS
Please see full consult note from Dr. Montemayor on 2/17.    Electronically signed by Fern Cash DO on 2/18/2025 at 8:36 AM

## 2025-02-18 NOTE — PROGRESS NOTES
EGD complete, Dilation complete with 48 Fr american dilator. Guide wire tip intact upon removal. photos taken, pt tolerated procedure well. Scope Number  used.

## 2025-02-18 NOTE — PROGRESS NOTES
[facility]  INPATIENT PHYSICAL THERAPY  DAILY NOTE  STRZ ICU STEPDOWN TELEMETRY 4K - STRZ ENDO POOL RM/NONE      Discharge Recommendations: Home with Home Health PT  Equipment Recommendations: No               Time In: 1026  Time Out: 1049  Timed Code Treatment Minutes: 23 Minutes  Minutes: 23          Date: 2025  Patient Name: Vladimir Muñiz,  Gender:  male        MRN: [id number]  : 1950  (74 y.o.)     Referring Practitioner: Behl, Ashita, MD  Diagnosis: Pneumonia of both lower lobes due to infectious organism  Additional Pertinent Hx: Per EMR \"Vladimir Muñiz is a 74 y.o. male with PMHx of tracheomalacia, HTN, HLD, restless leg syndrome, anxiety who presents to [facility] with chronic cough.  Patient reported ongoing since the previous stenting surgery done for tracheomalacia.  Patient had a follow-up visit with Dr. Meyers, and reported his dyspnea has been improving.  Patient was supposed to schedule a follow-up appointment for possible bronchoscopy and further evaluation, which patient has been unable to schedule.  Patient also reported trouble swallowing, ongoing for past couple of months and total weight loss of 20 pounds.  Patient also reports feeling lonely at home, unable to cook food for himself lost interest in things and also loss of appetite.  Patient reported that he wanted someone to hear about his pain and chronic cough and that's another reason he presented to the hospital.  Patient denies fever/chills, nausea/vomiting, pain, urinary symptoms, constipation/diarrhea, chest pain, chest palpitation. \"     Prior Level of Function:  Lives With: Daughter  Type of Home: House  Home Layout: One level  Home Access: Stairs to enter with rails  Entrance Stairs - Number of Steps: 1-2 depending on the entrance  Home Equipment: Cane - Quad, Walker - Rolling   Bathroom Shower/Tub: Tub/Shower unit  Bathroom Toilet: Standard  Bathroom Equipment: Grab bars in shower    Prior

## 2025-02-18 NOTE — PROGRESS NOTES
1426: patient to phase 2 via bed, report received, vital signs remain stable, patient awake with spontaneous respirations. Report called to 4k rn

## 2025-02-18 NOTE — PLAN OF CARE
Problem: Discharge Planning  Goal: Discharge to home or other facility with appropriate resources  Outcome: Progressing  Flowsheets (Taken 2/18/2025 1120)  Discharge to home or other facility with appropriate resources:   Identify barriers to discharge with patient and caregiver   Identify discharge learning needs (meds, wound care, etc)   Refer to discharge planning if patient needs post-hospital services based on physician order or complex needs related to functional status, cognitive ability or social support system   Arrange for needed discharge resources and transportation as appropriate   Arrange for interpreters to assist at discharge as needed  Note: Patient plans to return home with daughter at discharge and no needs voiced at this time. Patient working with social work for discharge planning.        Problem: Safety - Adult  Goal: Free from fall injury  Outcome: Progressing  Flowsheets (Taken 2/18/2025 1120)  Free From Fall Injury: Instruct family/caregiver on patient safety  Note: Patient alert and oriented x4. Bed alarmed armed. Bed wheels locked. Bedside table in reach. Patient up with assistance when ambulating. Patient verbalizes and demonstrates the use of the call light. Hourly rounding being completed.       Problem: Pain  Goal: Verbalizes/displays adequate comfort level or baseline comfort level  Outcome: Progressing  Flowsheets (Taken 2/18/2025 1120)  Verbalizes/displays adequate comfort level or baseline comfort level:   Administer analgesics based on type and severity of pain and evaluate response   Consider cultural and social influences on pain and pain management   Encourage patient to monitor pain and request assistance   Assess pain using appropriate pain scale   Implement non-pharmacological measures as appropriate and evaluate response   Notify Licensed Independent Practitioner if interventions unsuccessful or patient reports new pain  Note: Pain Assessment: 0-10  Pain Level: 0

## 2025-02-18 NOTE — ANESTHESIA POSTPROCEDURE EVALUATION
Department of Anesthesiology  Postprocedure Note    Patient: Vladimir Muñiz  MRN: [id number]  YOB: 1950  Date of evaluation: 2/18/2025    Procedure Summary       Date: 02/18/25 Room / Location: [place] [address] / [facility]    Anesthesia Start: 1406 Anesthesia Stop: 1426    Procedure: ESOPHAGOGASTRODUODENOSCOPY DILATATION Diagnosis:       SOB (shortness of breath)      (SOB (shortness of breath) [R06.02])    Surgeons: Mary Martinez MD Responsible Provider: Patrick Carter MD    Anesthesia Type: MAC ASA Status: 3            Anesthesia Type: No value filed.    Koby Phase I: Koby Score: 10    Koby Phase II:      Anesthesia Post Evaluation    Patient location during evaluation: bedside  Patient participation: complete - patient participated  Level of consciousness: awake  Pain score: 0  Airway patency: patent  Nausea & Vomiting: no nausea and no vomiting  Cardiovascular status: hemodynamically stable and blood pressure returned to baseline  Respiratory status: room air, nonlabored ventilation and spontaneous ventilation  Hydration status: stable        No notable events documented.

## 2025-02-18 NOTE — PROCEDURES
PROCEDURE NOTE  Date: 2/18/2025   Name: Vladimir Muñiz  YOB: 1950    ProceduresEKG completed, given to Clarisa PEREA

## 2025-02-18 NOTE — PROGRESS NOTES
[facility]  OCCUPATIONAL THERAPY MISSED TREATMENT NOTE  STRZ ENDOSCOPY  STRZ ENDO POOL RM/NONE      Date: 2025  Patient Name: Vladimir Muñiz        CSN: [id number]   : 1950  (74 y.o.)  Gender: male   Referring Practitioner: Behl, Ashita, MD            REASON FOR MISSED TREATMENT: Patient Off Floor for Testing; patient in ENDO upon OT arrival. OT to check back as able and time allows.

## 2025-02-18 NOTE — PROGRESS NOTES
Pt admitted to Endo department and admitted to Endo room 5  Plan of care reviewed with patient.   Call light within reach.   Bed in lowest position, locked, with one bed rail up.   Appropriate arm bands on patient.   Bathroom offered.   All questions and concerns of patient addressed.  Allergies confirmed with patient.

## 2025-02-18 NOTE — ANESTHESIA PRE PROCEDURE
Department of Anesthesiology  Preprocedure Note       Name:  Vladimir Muñiz   Age:  74 y.o.  :  1950                                          MRN:  [id number]         Date:  2025      Surgeon: Surgeon(s):  Mary Martinez MD    Procedure: Procedure(s):  EGD W/ POSS DILATION    Medications prior to admission:   Prior to Admission medications    Medication Sig Start Date End Date Taking? Authorizing Provider   aspirin 81 MG EC tablet Take 1 tablet by mouth daily   Yes ProviderTien MD   gabapentin (NEURONTIN) 400 MG capsule Take 1 capsule by mouth 3 times daily.   Yes Provider, MD Tien   omeprazole (PRILOSEC) 40 MG delayed release capsule Take 1 capsule by mouth daily   Yes ProviderTien MD   amitriptyline (ELAVIL) 50 MG tablet Take 1 tablet by mouth nightly 24  Yes Provider, MD Tien   hydrOXYzine HCl (ATARAX) 10 MG tablet TAKE 1 TABLET BY MOUTH ONCE DAILY AT BEDTIME AS NEEDED   Yes ProviderTien MD   sulfaSALAzine (AZULFIDINE) 500 MG tablet Take 1 tablet by mouth daily 24  Yes Provider, MD Tien   terbinafine (LAMISIL) 250 MG tablet Take 1 tablet by mouth daily 24  Yes Provider, MD Tien   isosorbide mononitrate (IMDUR) 60 MG extended release tablet Take 1 tablet by mouth every morning 24  Yes Provider, MD Tien   calcium carbonate 600 MG TABS tablet Take 1 tablet by mouth daily   Yes Provider, MD Tien   sotalol (BETAPACE) 80 MG tablet Take 1 tablet by mouth 2 times daily   Yes Provider, MD Tien   pravastatin (PRAVACHOL) 40 MG tablet TAKE ONE TABLET BY MOUTH ONCE DAILY 16  Yes Eric Ramírez MD   ezetimibe (ZETIA) 10 MG tablet Take 1 tablet by mouth daily   Yes ProviderTien MD   benazepril (LOTENSIN) 40 MG tablet Take 1 tablet by mouth daily 12/14/15  Yes Eric Ramírez MD   amLODIPine (NORVASC) 5 MG tablet TAKE ONE TABLET BY MOUTH ONCE DAILY 9/10/15  Yes Eric Ramírez

## 2025-02-19 ENCOUNTER — APPOINTMENT (OUTPATIENT)
Dept: GENERAL RADIOLOGY | Age: 75
End: 2025-02-19
Payer: COMMERCIAL

## 2025-02-19 PROBLEM — E43 SEVERE MALNUTRITION: Status: ACTIVE | Noted: 2025-02-19

## 2025-02-19 LAB
ANION GAP SERPL CALC-SCNC: 16 MEQ/L (ref 8–16)
BASOPHILS ABSOLUTE: 0 THOU/MM3 (ref 0–0.1)
BASOPHILS NFR BLD AUTO: 0.6 %
BUN SERPL-MCNC: 6 MG/DL (ref 7–22)
CALCIUM SERPL-MCNC: 8.2 MG/DL (ref 8.2–9.6)
CHLORIDE SERPL-SCNC: 103 MEQ/L (ref 98–111)
CO2 SERPL-SCNC: 21 MEQ/L (ref 23–33)
CREAT SERPL-MCNC: 0.6 MG/DL (ref 0.4–1.2)
DEPRECATED RDW RBC AUTO: 44.8 FL (ref 35–45)
EOSINOPHIL NFR BLD AUTO: 4.5 %
EOSINOPHILS ABSOLUTE: 0.3 THOU/MM3 (ref 0–0.4)
ERYTHROCYTE [DISTWIDTH] IN BLOOD BY AUTOMATED COUNT: 13.1 % (ref 11.5–14.5)
GFR SERPL CREATININE-BSD FRML MDRD: > 90 ML/MIN/1.73M2
GLUCOSE BLD STRIP.AUTO-MCNC: 103 MG/DL (ref 70–108)
GLUCOSE BLD STRIP.AUTO-MCNC: 109 MG/DL (ref 70–108)
GLUCOSE BLD STRIP.AUTO-MCNC: 109 MG/DL (ref 70–108)
GLUCOSE BLD STRIP.AUTO-MCNC: 88 MG/DL (ref 70–108)
GLUCOSE BLD STRIP.AUTO-MCNC: 98 MG/DL (ref 70–108)
GLUCOSE SERPL-MCNC: 99 MG/DL (ref 70–108)
HCT VFR BLD AUTO: 36.7 % (ref 42–52)
HGB BLD-MCNC: 11.6 GM/DL (ref 14–18)
IMM GRANULOCYTES # BLD AUTO: 0.01 THOU/MM3 (ref 0–0.07)
IMM GRANULOCYTES NFR BLD AUTO: 0.2 %
LYMPHOCYTES ABSOLUTE: 1.9 THOU/MM3 (ref 1–4.8)
LYMPHOCYTES NFR BLD AUTO: 29 %
MCH RBC QN AUTO: 29.8 PG (ref 26–33)
MCHC RBC AUTO-ENTMCNC: 31.6 GM/DL (ref 32.2–35.5)
MCV RBC AUTO: 94.3 FL (ref 80–94)
MONOCYTES ABSOLUTE: 0.7 THOU/MM3 (ref 0.4–1.3)
MONOCYTES NFR BLD AUTO: 10.3 %
NEUTROPHILS ABSOLUTE: 3.5 THOU/MM3 (ref 1.8–7.7)
NEUTROPHILS NFR BLD AUTO: 55.4 %
NRBC BLD AUTO-RTO: 0 /100 WBC
PLATELET # BLD AUTO: 150 THOU/MM3 (ref 130–400)
PMV BLD AUTO: 11.2 FL (ref 9.4–12.4)
POTASSIUM SERPL-SCNC: 4.7 MEQ/L (ref 3.5–5.2)
RBC # BLD AUTO: 3.89 MILL/MM3 (ref 4.7–6.1)
SODIUM SERPL-SCNC: 140 MEQ/L (ref 135–145)
WBC # BLD AUTO: 6.4 THOU/MM3 (ref 4.8–10.8)

## 2025-02-19 PROCEDURE — 36415 COLL VENOUS BLD VENIPUNCTURE: CPT

## 2025-02-19 PROCEDURE — 97110 THERAPEUTIC EXERCISES: CPT

## 2025-02-19 PROCEDURE — 80048 BASIC METABOLIC PNL TOTAL CA: CPT

## 2025-02-19 PROCEDURE — 2580000003 HC RX 258

## 2025-02-19 PROCEDURE — 92526 ORAL FUNCTION THERAPY: CPT

## 2025-02-19 PROCEDURE — 6370000000 HC RX 637 (ALT 250 FOR IP): Performed by: STUDENT IN AN ORGANIZED HEALTH CARE EDUCATION/TRAINING PROGRAM

## 2025-02-19 PROCEDURE — 99233 SBSQ HOSP IP/OBS HIGH 50: CPT

## 2025-02-19 PROCEDURE — 2060000000 HC ICU INTERMEDIATE R&B

## 2025-02-19 PROCEDURE — 2580000003 HC RX 258: Performed by: STUDENT IN AN ORGANIZED HEALTH CARE EDUCATION/TRAINING PROGRAM

## 2025-02-19 PROCEDURE — 6360000002 HC RX W HCPCS

## 2025-02-19 PROCEDURE — 2700000000 HC OXYGEN THERAPY PER DAY

## 2025-02-19 PROCEDURE — 94660 CPAP INITIATION&MGMT: CPT

## 2025-02-19 PROCEDURE — 97530 THERAPEUTIC ACTIVITIES: CPT

## 2025-02-19 PROCEDURE — 92611 MOTION FLUOROSCOPY/SWALLOW: CPT

## 2025-02-19 PROCEDURE — 94761 N-INVAS EAR/PLS OXIMETRY MLT: CPT

## 2025-02-19 PROCEDURE — 6370000000 HC RX 637 (ALT 250 FOR IP)

## 2025-02-19 PROCEDURE — 6360000002 HC RX W HCPCS: Performed by: STUDENT IN AN ORGANIZED HEALTH CARE EDUCATION/TRAINING PROGRAM

## 2025-02-19 PROCEDURE — 74230 X-RAY XM SWLNG FUNCJ C+: CPT

## 2025-02-19 PROCEDURE — 82948 REAGENT STRIP/BLOOD GLUCOSE: CPT

## 2025-02-19 PROCEDURE — 97116 GAIT TRAINING THERAPY: CPT

## 2025-02-19 PROCEDURE — 2500000003 HC RX 250 WO HCPCS: Performed by: STUDENT IN AN ORGANIZED HEALTH CARE EDUCATION/TRAINING PROGRAM

## 2025-02-19 PROCEDURE — 85025 COMPLETE CBC W/AUTO DIFF WBC: CPT

## 2025-02-19 PROCEDURE — 2500000003 HC RX 250 WO HCPCS

## 2025-02-19 RX ADMIN — MIRTAZAPINE 7.5 MG: 7.5 TABLET, FILM COATED ORAL at 21:08

## 2025-02-19 RX ADMIN — PRAVASTATIN SODIUM 40 MG: 40 TABLET ORAL at 21:08

## 2025-02-19 RX ADMIN — OXYBUTYNIN CHLORIDE 5 MG: 5 TABLET, EXTENDED RELEASE ORAL at 09:10

## 2025-02-19 RX ADMIN — TAMSULOSIN HYDROCHLORIDE 0.4 MG: 0.4 CAPSULE ORAL at 21:08

## 2025-02-19 RX ADMIN — SODIUM CHLORIDE, PRESERVATIVE FREE 10 ML: 5 INJECTION INTRAVENOUS at 21:08

## 2025-02-19 RX ADMIN — GABAPENTIN 400 MG: 400 CAPSULE ORAL at 21:08

## 2025-02-19 RX ADMIN — BARIUM SULFATE 10 ML: 400 PASTE ORAL at 10:00

## 2025-02-19 RX ADMIN — AZITHROMYCIN MONOHYDRATE 250 MG: 500 INJECTION, POWDER, LYOPHILIZED, FOR SOLUTION INTRAVENOUS at 00:17

## 2025-02-19 RX ADMIN — GUAIFENESIN 600 MG: 600 TABLET ORAL at 09:10

## 2025-02-19 RX ADMIN — GABAPENTIN 400 MG: 400 CAPSULE ORAL at 15:20

## 2025-02-19 RX ADMIN — LISINOPRIL 40 MG: 40 TABLET ORAL at 09:10

## 2025-02-19 RX ADMIN — AMLODIPINE BESYLATE 5 MG: 5 TABLET ORAL at 09:10

## 2025-02-19 RX ADMIN — BARIUM SULFATE 50 ML: 0.81 POWDER, FOR SUSPENSION ORAL at 10:00

## 2025-02-19 RX ADMIN — PANTOPRAZOLE SODIUM 40 MG: 40 TABLET, DELAYED RELEASE ORAL at 05:21

## 2025-02-19 RX ADMIN — ISOSORBIDE MONONITRATE 60 MG: 60 TABLET, EXTENDED RELEASE ORAL at 09:10

## 2025-02-19 RX ADMIN — DEXTROSE AND SODIUM CHLORIDE: 5; .45 INJECTION, SOLUTION INTRAVENOUS at 10:22

## 2025-02-19 RX ADMIN — ENOXAPARIN SODIUM 40 MG: 100 INJECTION SUBCUTANEOUS at 09:10

## 2025-02-19 RX ADMIN — TAMSULOSIN HYDROCHLORIDE 0.4 MG: 0.4 CAPSULE ORAL at 09:10

## 2025-02-19 RX ADMIN — GABAPENTIN 400 MG: 400 CAPSULE ORAL at 09:10

## 2025-02-19 RX ADMIN — EZETIMIBE 10 MG: 10 TABLET ORAL at 09:10

## 2025-02-19 RX ADMIN — GUAIFENESIN 600 MG: 600 TABLET ORAL at 00:11

## 2025-02-19 RX ADMIN — GUAIFENESIN 600 MG: 600 TABLET ORAL at 21:08

## 2025-02-19 RX ADMIN — WATER 1000 MG: 1 INJECTION INTRAMUSCULAR; INTRAVENOUS; SUBCUTANEOUS at 00:12

## 2025-02-19 NOTE — PROGRESS NOTES
[facility]  SPEECH THERAPY  STRZ ICU STEPDOWN TELEMETRY 4K  Modified Barium Swallow    Discharge Recommendations: Home with Home Exercise Program  DIET ORDER RECOMMENDATIONS AFTER EVALUATION: Soft and bite size, thin liquids  Strategies:  Full Upright Position, Small Bite/Sip, Multiple Swallow, Pulmonary Monitoring, Oral Care after all Meals, Medication in Applesauce, Alternate Solids and Liquids, Limit Distractions, and Monitor for Fatigue     SLP Individual Minutes  Time In: 0933  Time Out: 1000  Minutes: 27  Timed Code Treatment Minutes: 0 Minutes       Date: 2025  Patient Name: Vladimir Muñiz      CSN: [id number]   : 1950  (74 y.o.)  Gender: male   Referring Physician: Ashita Behl, MD  Diagnosis: Elevated troponin [R79.89]  Tracheobronchomalacia [J39.8]  Pneumonia of both lower lobes due to infectious organism [J18.9]  Community acquired pneumonia of left lower lobe of lung [J18.9]  Lab test negative for COVID-19 virus [Z20.822]  Precautions: Fall risk, aspiration precautions  History of Present Illness/Injury: Patient presented to [facility] with the above medical dx.     Physician H&P: \"74 y.o. male with PMHx of tracheomalacia, HTN, HLD, restless leg syndrome, anxiety who presents to [facility] with chronic cough.  Patient reported ongoing since the previous stenting surgery done for tracheomalacia.  Patient had a follow-up visit with Dr. Meyers, and reported his dyspnea has been improving.  Patient was supposed to schedule a follow-up appointment for possible bronchoscopy and further evaluation, which patient has been unable to schedule.  Patient also reported trouble swallowing, ongoing for past couple of months and total weight loss of 20 pounds.  Patient also reports feeling lonely at home, unable to cook food for himself lost interest in things and also loss of appetite.  Patient reported that he wanted someone to hear about his pain and chronic cough and  derived clinical findings outlined above. Oral phase slightly prolonged within coarse offerings s/t challenges with textural breakdown. Relatively appropriate control/containment measures with no appreciable premature pharyngeal spillage. Swallow onset timely with grossly intact hyolaryngeal elevation, however, dysfunction for anterior excursion/protraction and thyrohyoid approximation to superimpose deficits on totality of epiglottic inversion as it r/t laryngeal vestibule closure. Further evidence for a weakened TBR and pharyngeal constriction/stripping to result in presence mod-marked stasis dominating the valleculae, pyriforms, and posterior pharyngeal wall with min stasis in the lateral channels post swallow; residuals partially cleared with repetitive swallows and liquid assist.     Positive findings for transient, shallow laryngeal penetration during the swallow with thin barium (consecutive, large sips). No direct observations for tracheal aspiration.     Recommendations for initiation of a soft and bite size diet with thin liquids, subsequent dysphagia management services to be rendered within acute hospitalization for development of pharyngeal exercise program as it r/t rehabilitation to maximize safety/efficiency of the swallow mechanism.     CONSIDERATIONS: Concerns for a complex airway should patient require oral intubation given below findings within formal imaging as it r/t epiglottis approximation to posterior pharyngeal wall upon inspiration. Contact completed to CHIVO Hardy (ENT) and Jules Trujillo MD re: concerns.         Aspiration Pneumonia Predictors:  Chronic Medical Issues/Pertinent Co-Morbidities and Gastrointestinal Disease  Functional Oral Intake Scale: Total Oral Intake: 5.  Total oral intake of multiple consistencies requiring special preparation   Rehabilitation Potential: good    EDUCATION:  Learner: Patient  Education:  Reviewed results and recommendations of this

## 2025-02-19 NOTE — PLAN OF CARE
Problem: Discharge Planning  Goal: Discharge to home or other facility with appropriate resources  Outcome: Progressing  Flowsheets (Taken 2/19/2025 0447)  Discharge to home or other facility with appropriate resources:   Identify barriers to discharge with patient and caregiver   Identify discharge learning needs (meds, wound care, etc)     Problem: Safety - Adult  Goal: Free from fall injury  Outcome: Progressing  Flowsheets (Taken 2/19/2025 0447)  Free From Fall Injury: Instruct family/caregiver on patient safety     Problem: Pain  Goal: Verbalizes/displays adequate comfort level or baseline comfort level  Outcome: Progressing  Flowsheets (Taken 2/19/2025 0447)  Verbalizes/displays adequate comfort level or baseline comfort level:   Encourage patient to monitor pain and request assistance   Administer analgesics based on type and severity of pain and evaluate response   Consider cultural and social influences on pain and pain management   Assess pain using appropriate pain scale   Implement non-pharmacological measures as appropriate and evaluate response     Problem: Respiratory - Adult  Goal: Achieves optimal ventilation and oxygenation  Outcome: Progressing  Flowsheets (Taken 2/19/2025 0447)  Achieves optimal ventilation and oxygenation:   Assess for changes in respiratory status   Respiratory therapy support as indicated   Assess for changes in mentation and behavior   Oxygen supplementation based on oxygen saturation or arterial blood gases   Encourage broncho-pulmonary hygiene including cough, deep breathe, incentive spirometry   Assess and instruct to report shortness of breath or any respiratory difficulty

## 2025-02-19 NOTE — PROGRESS NOTES
[facility]  INPATIENT PHYSICAL THERAPY  DAILY NOTE  STRZ ICU STEPDOWN TELEMETRY 4K - 4K-23/023-A      Discharge Recommendations: Home with Home Health PT  Equipment Recommendations: No               Time In: 823  Time Out: 902  Timed Code Treatment Minutes: 39 Minutes  Minutes: 39          Date: 2025  Patient Name: Vladimir Muñiz,  Gender:  male        MRN: [id number]  : 1950  (74 y.o.)     Referring Practitioner: Behl, Ashita, MD  Diagnosis: Pneumonia of both lower lobes due to infectious organism  Additional Pertinent Hx: Per EMR \"Vladimir Muñiz is a 74 y.o. male with PMHx of tracheomalacia, HTN, HLD, restless leg syndrome, anxiety who presents to [facility] with chronic cough.  Patient reported ongoing since the previous stenting surgery done for tracheomalacia.  Patient had a follow-up visit with Dr. Meyers, and reported his dyspnea has been improving.  Patient was supposed to schedule a follow-up appointment for possible bronchoscopy and further evaluation, which patient has been unable to schedule.  Patient also reported trouble swallowing, ongoing for past couple of months and total weight loss of 20 pounds.  Patient also reports feeling lonely at home, unable to cook food for himself lost interest in things and also loss of appetite.  Patient reported that he wanted someone to hear about his pain and chronic cough and that's another reason he presented to the hospital.  Patient denies fever/chills, nausea/vomiting, pain, urinary symptoms, constipation/diarrhea, chest pain, chest palpitation. \"     Prior Level of Function:  Lives With: Daughter  Type of Home: House  Home Layout: One level  Home Access: Stairs to enter with rails  Entrance Stairs - Number of Steps: 1-2 depending on the entrance  Home Equipment: Cane - Quad, Walker - Rolling   Bathroom Shower/Tub: Tub/Shower unit  Bathroom Toilet: Standard  Bathroom Equipment: Grab bars in shower    Prior Level of

## 2025-02-19 NOTE — PROGRESS NOTES
pulmonology consulted, no inpatient intervention.  Patient failed bedside swallow 2/16.  GI consulted for dysphagia, EGD with dilation performed on 2/18.  SLP performed MBS on 2/20, found to have oral/pharyngeal dysphagia with aspiration symptoms.  Cardiology consulted for elevated troponins, atypical chest pain; EKG no ST elevation/depression.  TTE EF 55 to 60%, stress negative for ischemia.    Medications:    Infusion Medications    dextrose      sodium chloride      Scheduled Medications    aspirin  81 mg Oral Daily    guaiFENesin  600 mg Oral BID    mirtazapine  7.5 mg Oral Nightly    amLODIPine  5 mg Oral Daily    ezetimibe  10 mg Oral Daily    gabapentin  400 mg Oral TID    pantoprazole  40 mg Oral QAM AC    oxyBUTYnin  5 mg Oral Daily    [Held by provider] sotalol  80 mg Oral BID    tamsulosin  0.4 mg Oral BID    lisinopril  40 mg Oral Daily    isosorbide mononitrate  60 mg Oral QAM    sodium chloride flush  5-40 mL IntraVENous 2 times per day    prasugrel  5 mg Oral Daily    azithromycin  250 mg IntraVENous Q24H    cefTRIAXone (ROCEPHIN) IV  1,000 mg IntraVENous Q24H    pravastatin  40 mg Oral Nightly    enoxaparin  40 mg SubCUTAneous Daily    PRN Meds: dextrose bolus **OR** dextrose bolus, glucose, glucagon (rDNA), dextrose, hydrOXYzine HCl, melatonin, sodium chloride (Inhalant), albuterol, benzonatate, sodium chloride flush, sodium chloride, potassium chloride **OR** potassium alternative oral replacement **OR** potassium chloride, magnesium sulfate, polyethylene glycol, acetaminophen **OR** acetaminophen, labetalol, sodium chloride nebulizer    Exam:  /73   Pulse (!) 113   Temp 97.9 °F (36.6 °C) (Oral)   Resp 18   Ht 1.753 m (5' 9\")   Wt 74.5 kg (164 lb 4.8 oz)   SpO2 98%   BMI 24.26 kg/m²   General: No distress, appears stated age.  Eyes:  PERRL. Conjunctivae/corneas clear.  HENT: Head normal appearing. Nares normal. Oral mucosa moist.  Hearing intact.   Neck: Supple, with full range of

## 2025-02-19 NOTE — PROGRESS NOTES
[facility]  STRZ ICU STEPDOWN TELEMETRY 4K  Occupational Therapy  Daily Note    Discharge Recommendations: Home with Home Health OT  Equipment Recommendations: No Pt would benefit from meals on wheels and/or a home health aide       Time In: 1040  Time Out: 1105  Timed Code Treatment Minutes: 25 Minutes  Minutes: 25          Date: 2025  Patient Name: Vladimir Muñiz,   Gender: male      Room: [facility]23/023-  MRN: [id number]  : 1950  (74 y.o.)  Referring Practitioner: Behl, Ashita, MD  Diagnosis: Pneumonia of both lower lobes due to infectious organism  Additional Pertinent Hx: 74 y.o. male with PMHx of tracheomalacia, HTN, HLD, restless leg syndrome, anxiety who presents to [facility] with chronic cough.  Patient reported ongoing since the previous stenting surgery done for tracheomalacia.  Patient had a follow-up visit with Dr. Meyers, and reported his dyspnea has been improving.  Patient was supposed to schedule a follow-up appointment for possible bronchoscopy and further evaluation, which patient has been unable to schedule.  Patient also reported trouble swallowing, ongoing for past couple of months and total weight loss of 20 pounds.  Patient also reports feeling lonely at home, unable to cook food for himself lost interest in things and also loss of appetite.  Patient reported that he wanted someone to hear about his pain and chronic cough and that's another reason he presented to the hospital. Pt endorses feelings of suicidal ideation.    Restrictions/Precautions:  Restrictions/Precautions: General Precautions, Fall Risk  Position Activity Restriction  Other Position/Activity Restrictions: suicide precautions, [place] [employer]      Social/Functional History:  Lives With: Daughter  Type of Home: House  Home Layout: One level  Home Access: Stairs to enter with rails  Entrance Stairs - Number of Steps: 1-2 depending on the entrance  Home Equipment: Cane - Quad, Walker - Rolling

## 2025-02-19 NOTE — PLAN OF CARE
Problem: Respiratory - Adult  Goal: Achieves optimal ventilation and oxygenation  2/19/2025 0541 by Kate Michael RCP  Outcome: Progressing

## 2025-02-19 NOTE — CONSULTS
Comprehensive Nutrition Assessment    Type and Reason for Visit: Initial, Consult (Poor intake/ appetite 5 or more days)    Nutrition Recommendations/Plan:   Continue current diet per SLP recommendations.   Initiate Magic Cups TID.   Continue appetite stimulant- Remeron.   Recommend food resources/ food delivery services pending diet.      Malnutrition Assessment:  Malnutrition Status: Severe malnutrition  Context: Chronic Illness       Findings of the 6 clinical characteristics of malnutrition:  Energy Intake:  75% or less estimated energy requirements for 1 month or longer  Weight Loss:  Greater than 10% over 6 months (12.3% (23 lb) weight loss in the last 6 months which is significant)     Body Fat Loss:   (moderate) Orbital, Triceps, Buccal region   Muscle Mass Loss:   (moderate) Clavicles (pectoralis & deltoids), Temples (temporalis), Calf (gastrocnemius), Hand (interosseous)  Fluid Accumulation:  No fluid accumulation     Strength:  Not Performed    Nutrition Assessment:    Pt. severely malnourished AEB criteria listed above. At risk for further nutritional compromise r/t admitted d/t chronic cough, recent stenting surgery for tracheomalacia, recent difficulty swallowing and weight loss. CT shower community- acquired pneumonia. Endoscopy on 2/18, GI signed off- SLP following for oropharyngeal dysphagia. Noted underlying medical condition (PMHx anxiety, CAD, Ganglion cyst, GERD, HLD, HTN, Major depression, osteoarthritis, RLS, urinary retention).    Nutrition Related Findings:    Pt. Report/Treatments/Miscellaneous: Patient seen, sitting up in chair in room. He reports that his appetite has been \"lowsy\" for the last 8-9 months. He lives with his daughter who is gone from 8am-3pm and during that time patient does not eat very much or very well d/t concern with foods getting stuck when swallowing. He reports he mostly eats pudding/ applesauce when daughter is at work and then they eat a lot of frozen meals

## 2025-02-19 NOTE — PLAN OF CARE
Problem: Discharge Planning  Goal: Discharge to home or other facility with appropriate resources  2/19/2025 0856 by Tesha Bah RN  Outcome: Progressing  Flowsheets (Taken 2/19/2025 0856)  Discharge to home or other facility with appropriate resources:   Identify barriers to discharge with patient and caregiver   Arrange for needed discharge resources and transportation as appropriate     Problem: Safety - Adult  Goal: Free from fall injury  2/19/2025 0856 by Tesha Bah RN  Outcome: Progressing  Flowsheets (Taken 2/19/2025 0856)  Free From Fall Injury:   Instruct family/caregiver on patient safety   Based on caregiver fall risk screen, instruct family/caregiver to ask for assistance with transferring infant if caregiver noted to have fall risk factors     Problem: Pain  Goal: Verbalizes/displays adequate comfort level or baseline comfort level  2/19/2025 0856 by Tesha Bah RN  Outcome: Progressing  Flowsheets (Taken 2/19/2025 0856)  Verbalizes/displays adequate comfort level or baseline comfort level:   Encourage patient to monitor pain and request assistance   Assess pain using appropriate pain scale   Implement non-pharmacological measures as appropriate and evaluate response     Problem: Respiratory - Adult  Goal: Achieves optimal ventilation and oxygenation  2/19/2025 0856 by Tesha Bah RN  Outcome: Progressing  Flowsheets (Taken 2/19/2025 0856)  Achieves optimal ventilation and oxygenation:   Assess for changes in respiratory status   Assess for changes in mentation and behavior     Problem: Self Harm/Suicidality  Goal: Will have no self-injury during hospital stay  Description: INTERVENTIONS:  1.  Ensure constant observer at bedside with Q15M safety checks  2.  Maintain a safe environment  3.  Secure patient belongings  4.  Ensure family/visitors adhere to safety recommendations  5.  Ensure safety tray has been added to patient's diet order  6.  Every shift and PRN: Re-assess

## 2025-02-20 ENCOUNTER — TELEPHONE (OUTPATIENT)
Dept: ENT CLINIC | Age: 75
End: 2025-02-20

## 2025-02-20 LAB
ANION GAP SERPL CALC-SCNC: 13 MEQ/L (ref 8–16)
BUN SERPL-MCNC: 10 MG/DL (ref 7–22)
CALCIUM SERPL-MCNC: 8.7 MG/DL (ref 8.2–9.6)
CHLORIDE SERPL-SCNC: 106 MEQ/L (ref 98–111)
CO2 SERPL-SCNC: 25 MEQ/L (ref 23–33)
CREAT SERPL-MCNC: 0.8 MG/DL (ref 0.4–1.2)
DEPRECATED RDW RBC AUTO: 44.5 FL (ref 35–45)
ERYTHROCYTE [DISTWIDTH] IN BLOOD BY AUTOMATED COUNT: 13.1 % (ref 11.5–14.5)
GFR SERPL CREATININE-BSD FRML MDRD: > 90 ML/MIN/1.73M2
GLUCOSE BLD STRIP.AUTO-MCNC: 113 MG/DL (ref 70–108)
GLUCOSE BLD STRIP.AUTO-MCNC: 117 MG/DL (ref 70–108)
GLUCOSE BLD STRIP.AUTO-MCNC: 140 MG/DL (ref 70–108)
GLUCOSE BLD STRIP.AUTO-MCNC: 88 MG/DL (ref 70–108)
GLUCOSE BLD STRIP.AUTO-MCNC: 92 MG/DL (ref 70–108)
GLUCOSE SERPL-MCNC: 94 MG/DL (ref 70–108)
HCT VFR BLD AUTO: 35.7 % (ref 42–52)
HGB BLD-MCNC: 11.5 GM/DL (ref 14–18)
MCH RBC QN AUTO: 29.9 PG (ref 26–33)
MCHC RBC AUTO-ENTMCNC: 32.2 GM/DL (ref 32.2–35.5)
MCV RBC AUTO: 93 FL (ref 80–94)
PLATELET # BLD AUTO: 179 THOU/MM3 (ref 130–400)
PMV BLD AUTO: 11.3 FL (ref 9.4–12.4)
POTASSIUM SERPL-SCNC: 4.8 MEQ/L (ref 3.5–5.2)
RBC # BLD AUTO: 3.84 MILL/MM3 (ref 4.7–6.1)
SODIUM SERPL-SCNC: 144 MEQ/L (ref 135–145)
WBC # BLD AUTO: 6.7 THOU/MM3 (ref 4.8–10.8)

## 2025-02-20 PROCEDURE — 85027 COMPLETE CBC AUTOMATED: CPT

## 2025-02-20 PROCEDURE — 6370000000 HC RX 637 (ALT 250 FOR IP): Performed by: STUDENT IN AN ORGANIZED HEALTH CARE EDUCATION/TRAINING PROGRAM

## 2025-02-20 PROCEDURE — 99233 SBSQ HOSP IP/OBS HIGH 50: CPT

## 2025-02-20 PROCEDURE — 6370000000 HC RX 637 (ALT 250 FOR IP)

## 2025-02-20 PROCEDURE — 2700000000 HC OXYGEN THERAPY PER DAY

## 2025-02-20 PROCEDURE — 92526 ORAL FUNCTION THERAPY: CPT

## 2025-02-20 PROCEDURE — 2500000003 HC RX 250 WO HCPCS

## 2025-02-20 PROCEDURE — 80048 BASIC METABOLIC PNL TOTAL CA: CPT

## 2025-02-20 PROCEDURE — 97110 THERAPEUTIC EXERCISES: CPT

## 2025-02-20 PROCEDURE — 97530 THERAPEUTIC ACTIVITIES: CPT

## 2025-02-20 PROCEDURE — 2580000003 HC RX 258

## 2025-02-20 PROCEDURE — 97116 GAIT TRAINING THERAPY: CPT

## 2025-02-20 PROCEDURE — 94660 CPAP INITIATION&MGMT: CPT

## 2025-02-20 PROCEDURE — 6360000002 HC RX W HCPCS: Performed by: STUDENT IN AN ORGANIZED HEALTH CARE EDUCATION/TRAINING PROGRAM

## 2025-02-20 PROCEDURE — 82948 REAGENT STRIP/BLOOD GLUCOSE: CPT

## 2025-02-20 PROCEDURE — 6360000002 HC RX W HCPCS

## 2025-02-20 PROCEDURE — 36415 COLL VENOUS BLD VENIPUNCTURE: CPT

## 2025-02-20 PROCEDURE — 2060000000 HC ICU INTERMEDIATE R&B

## 2025-02-20 RX ORDER — METOPROLOL SUCCINATE 25 MG/1
25 TABLET, EXTENDED RELEASE ORAL DAILY
Status: DISCONTINUED | OUTPATIENT
Start: 2025-02-20 | End: 2025-02-21 | Stop reason: HOSPADM

## 2025-02-20 RX ADMIN — GUAIFENESIN 600 MG: 600 TABLET ORAL at 20:34

## 2025-02-20 RX ADMIN — SODIUM CHLORIDE, PRESERVATIVE FREE 10 ML: 5 INJECTION INTRAVENOUS at 08:38

## 2025-02-20 RX ADMIN — HYDROXYZINE HYDROCHLORIDE 10 MG: 10 TABLET ORAL at 20:34

## 2025-02-20 RX ADMIN — GUAIFENESIN 600 MG: 600 TABLET ORAL at 08:37

## 2025-02-20 RX ADMIN — PRAVASTATIN SODIUM 40 MG: 40 TABLET ORAL at 20:34

## 2025-02-20 RX ADMIN — EZETIMIBE 10 MG: 10 TABLET ORAL at 08:38

## 2025-02-20 RX ADMIN — ACETAMINOPHEN 650 MG: 325 TABLET ORAL at 22:36

## 2025-02-20 RX ADMIN — AMLODIPINE BESYLATE 5 MG: 5 TABLET ORAL at 08:38

## 2025-02-20 RX ADMIN — WATER 1000 MG: 1 INJECTION INTRAMUSCULAR; INTRAVENOUS; SUBCUTANEOUS at 00:18

## 2025-02-20 RX ADMIN — LISINOPRIL 40 MG: 40 TABLET ORAL at 08:38

## 2025-02-20 RX ADMIN — PRASUGREL 5 MG: 10 TABLET, FILM COATED ORAL at 08:37

## 2025-02-20 RX ADMIN — ISOSORBIDE MONONITRATE 60 MG: 60 TABLET, EXTENDED RELEASE ORAL at 08:38

## 2025-02-20 RX ADMIN — GABAPENTIN 400 MG: 400 CAPSULE ORAL at 20:34

## 2025-02-20 RX ADMIN — GABAPENTIN 400 MG: 400 CAPSULE ORAL at 08:37

## 2025-02-20 RX ADMIN — OXYBUTYNIN CHLORIDE 5 MG: 5 TABLET, EXTENDED RELEASE ORAL at 08:38

## 2025-02-20 RX ADMIN — METOPROLOL SUCCINATE 25 MG: 25 TABLET, FILM COATED, EXTENDED RELEASE ORAL at 16:08

## 2025-02-20 RX ADMIN — SODIUM CHLORIDE, PRESERVATIVE FREE 10 ML: 5 INJECTION INTRAVENOUS at 20:35

## 2025-02-20 RX ADMIN — GABAPENTIN 400 MG: 400 CAPSULE ORAL at 14:25

## 2025-02-20 RX ADMIN — AZITHROMYCIN MONOHYDRATE 250 MG: 500 INJECTION, POWDER, LYOPHILIZED, FOR SOLUTION INTRAVENOUS at 00:22

## 2025-02-20 RX ADMIN — ENOXAPARIN SODIUM 40 MG: 100 INJECTION SUBCUTANEOUS at 08:38

## 2025-02-20 RX ADMIN — TAMSULOSIN HYDROCHLORIDE 0.4 MG: 0.4 CAPSULE ORAL at 08:37

## 2025-02-20 RX ADMIN — PANTOPRAZOLE SODIUM 40 MG: 40 TABLET, DELAYED RELEASE ORAL at 05:27

## 2025-02-20 RX ADMIN — MIRTAZAPINE 7.5 MG: 7.5 TABLET, FILM COATED ORAL at 20:34

## 2025-02-20 RX ADMIN — TAMSULOSIN HYDROCHLORIDE 0.4 MG: 0.4 CAPSULE ORAL at 20:34

## 2025-02-20 RX ADMIN — ASPIRIN 81 MG: 81 TABLET, CHEWABLE ORAL at 08:37

## 2025-02-20 ASSESSMENT — PAIN DESCRIPTION - ORIENTATION: ORIENTATION: RIGHT

## 2025-02-20 ASSESSMENT — PAIN DESCRIPTION - DESCRIPTORS: DESCRIPTORS: ACHING;DISCOMFORT

## 2025-02-20 ASSESSMENT — PAIN DESCRIPTION - LOCATION: LOCATION: RIB CAGE

## 2025-02-20 ASSESSMENT — PAIN - FUNCTIONAL ASSESSMENT: PAIN_FUNCTIONAL_ASSESSMENT: ACTIVITIES ARE NOT PREVENTED

## 2025-02-20 ASSESSMENT — PAIN SCALES - GENERAL: PAINLEVEL_OUTOF10: 3

## 2025-02-20 NOTE — PROGRESS NOTES
[facility]  INPATIENT SPEECH THERAPY  STRZ ICU STEPDOWN TELEMETRY 4K  PROGRESS NOTE    Discharge Recommendations: Continue to Assess Pending Progress    SLP Individual Minutes  Time In: 1040  Time Out: 1052  Minutes: 12  Timed Code Treatment Minutes: 0 Minutes       Date: 2025  Patient Name: Vladimir Muñiz      CSN: [id number]   : 1950  (74 y.o.)  Gender: male   Referring Physician:  Tiago Lainez DO  Diagnosis: Pneumonia of both lower lobes due to infectious organism  Precautions: Fall risk, aspiration precautions  Current Diet: Soft and bite size diet, thin liquids  Respiratory Status: Room Air  Swallowing Strategies: Full Upright Position, Small Bite/Sip, Multiple Swallow, Pulmonary Monitoring, Oral Care after all Meals, Medication in Applesauce, Alternate Solids and Liquids, Limit Distractions, and Monitor for Fatigue   Date of Last MBS/FEES:  2/15/2025    Pain:  No pain reported.    Subjective:  RN Juanita agreed to ST session this date. Upon arrival, patient resting upright in chair. Pleasant throughout. No family at bedside.    Short-Term Goals:  SHORT TERM GOAL #1:  Goal 1: Patient will consume soft & bite-sized diet/thin liquids (advanced texture trials vs baseline) with stable pulmonary status and incorporation of trained compensatory strategies to assist in nutrition/hydration needs.  INTERVENTIONS: Patient denied any s/s of aspiration with his consumption of breakfast tray earlier this date. He also denied any SOB. Patient asked ST many questions regarding his recent MBS findings, whether his tracheal stent is resulting in the pharyngeal weakness and when he will have further answers from ENT. ST reviewed with patient anatomy/physiology of the swallowing mechanism and findings of recent MBS; namely, impaired base of tongue retraction and impaired pharyngeal constriction. Discussed with patient that he will have to verbalize his concerns regarding his tracheal stent with ENT

## 2025-02-20 NOTE — PROGRESS NOTES
Hospitalist Progress Note  Internal Medicine Resident      Patient: Vladimir Muñiz 74 y.o. male      Unit/Bed: 4K-23/023-A    Admit Date: 2/15/2025      ASSESSMENT AND PLAN  Active Problems  Community-acquired pneumonia: 2/15 CT chest bilateral lower lobe pulmonary opacities.  Blood cultures negative x 2.  Respiratory cultures shows contaminant.  Influenza/COVID-negative.    Ceftriaxone, azithromycin x 5 days 2/17 - 2/20  Tracheobronchomalacia s/p stent placement 1/6/2025:  Report ongoing nonproductive cough s/p stent -noted to have significant improvement in respiratory symptoms and ENT progress note.  ENT signed off  Plan for stent removal outpatient with ENT, pulmonology Dr Mcnulty, tentatively plan for week of 2/24 - 2/28  Oral/pharyngeal dysphagia: SLP and dietitian following.  GI consulted -EGD with dilation done on 2/18.  MBS 2/19 showed transient laryngeal penetration consistent with aspiration  Soft, bite-size diet, thin liquids  ENT consulted for possible epiglottic dysfunction-no acute intervention, outpatient follow-up  Atypical chest pain, elevated troponin: 30s x 2 on presentation elevated to 40s x 2.  EKG no ST elevation or depression.  Sotalol held d/t concerns for QT prolongation.  2/18 TTE EF 55-60%, stress test negative for ischemia.  Cardiology consulted  Telemetry  Metoprolol succinate 25 mg daily  Per cardiology, do not restart sotalol.  Sinus tachycardia not an indication for sotalol  Major depressive disorder: Endorsed passive SI during hospitalization.  Worsening depression, low mood, anhedonia in the setting of medical issues.  Psychiatry consulted  Mirtazapine 7.5 mg nightly  Hydroxyzine PRN anxiety  NAGMA: Likely 2/2 recurrent NS infusion  Acute normocytic anemia: Hb 12 on presentation.  Continue to monitor, transfuse if symptomatic  Deconditioning:   PT OT  Home health PT OT ordered for discharge  Resolved Problems  Hypoglycemia: On 2/17.  Due to decreased p.o. intake.  DC D5 half

## 2025-02-20 NOTE — PROGRESS NOTES
[facility]  INPATIENT PHYSICAL THERAPY  DAILY NOTE  STRZ ICU STEPDOWN TELEMETRY 4K - 4K-23/023-A      Discharge Recommendations: Home with Home Health PT  Equipment Recommendations: No               Time In: 0951  Time Out: 1030  Timed Code Treatment Minutes: 39 Minutes  Minutes: 39          Date: 2025  Patient Name: Vladimir Muñiz,  Gender:  male        MRN: [id number]  : 1950  (74 y.o.)     Referring Practitioner: Behl, Ashita, MD  Diagnosis: Pneumonia of both lower lobes due to infectious organism  Additional Pertinent Hx: Per EMR \"Vladimir Muñiz is a 74 y.o. male with PMHx of tracheomalacia, HTN, HLD, restless leg syndrome, anxiety who presents to [facility] with chronic cough.  Patient reported ongoing since the previous stenting surgery done for tracheomalacia.  Patient had a follow-up visit with Dr. Meyers, and reported his dyspnea has been improving.  Patient was supposed to schedule a follow-up appointment for possible bronchoscopy and further evaluation, which patient has been unable to schedule.  Patient also reported trouble swallowing, ongoing for past couple of months and total weight loss of 20 pounds.  Patient also reports feeling lonely at home, unable to cook food for himself lost interest in things and also loss of appetite.  Patient reported that he wanted someone to hear about his pain and chronic cough and that's another reason he presented to the hospital.  Patient denies fever/chills, nausea/vomiting, pain, urinary symptoms, constipation/diarrhea, chest pain, chest palpitation. \"     Prior Level of Function:  Lives With: Daughter  Type of Home: House  Home Layout: One level  Home Access: Stairs to enter with rails  Entrance Stairs - Number of Steps: 1-2 depending on the entrance  Home Equipment: Cane - Quad, Walker - Rolling   Bathroom Shower/Tub: Tub/Shower unit  Bathroom Toilet: Standard  Bathroom Equipment: Grab bars in shower    Prior Level of  Applicable    ASSESSMENT:  Assessment: Patient progressing toward established goals.  Activity Tolerance:  Patient tolerance of  treatment:Good.  Plan: Current Treatment Recommendations: Balance training, Gait training, Strengthening, Functional mobility training, Stair training, Neuromuscular re-education, Transfer training, Endurance training, Equipment evaluation, education, & procurement, Patient/Caregiver education & training, Safety education & training, Therapeutic activities, Home exercise program  General Plan:  (5x GM)    Education:  Learners: Patient  Patient Education: Plan of Care, Transfers, Gait, Stairs, Verbal Exercise Instruction    Goals:  Patient Goals : return home  Short Term Goals  Time Frame for Short Term Goals: by discharge  Short Term Goal 1: Pt will amb for >75 feet to complete HH distances with LRAD and S to return home safely.  Short Term Goal 2: Pt will demo sit to/from stand transfers with LRAD with IND to return home safely.  Short Term Goal 3: Pt will demo IND with bed mobility tasks with bed flat to return home.  Short Term Goal 4: Pt will negotiate steps with LRAD and SBA to progress with tasks.  Long Term Goals  Time Frame for Long Term Goals : NA due to short ELOS    Following session, patient left in safe position with all fall risk precautions in place.

## 2025-02-20 NOTE — PROGRESS NOTES
Department of Otolaryngology  Progress Note    Patient completed MBS yesterday and imaging was reviewed with me personally for concern for epiglottic dysfunction. This is not acutely impacting patients breathing and cleared for soft and bite size diet with thin liquids. No evidence of tracheal aspiration. This will be further reviewed at outpatient follow up appointment with Dr. Meyers after tracheal stent removal for consideration for bronchoscopy and laryngoscopy in office.     Electronically signed by CHACHA Jones CNP on 2/20/2025 at 10:27 AM

## 2025-02-20 NOTE — PROGRESS NOTES
Comprehensive Nutrition Assessment    Type and Reason for Visit:  Reassess, Patient education    Nutrition Recommendations/Plan:   Continue current diet per ENT.  Send Magic Cup TID.  Continue Remeron .  Provided home delivered meal info. Pt mentions on waiting list for Meals on Wheels.   Consider MVI as appropriate.  Consider Vitamin B 12 level as appropriate.  Provided diet education & reviewed Dysphagia Soft & Bite Sized diet & gave handouts.      Malnutrition Assessment:  Malnutrition Status:  Severe malnutrition (02/19/25 1137)    Context:  Chronic Illness     Findings of the 6 clinical characteristics of malnutrition:  Energy Intake:  75% or less estimated energy requirements for 1 month or longer  Weight Loss:  Greater than 10% over 6 months (12.3% (23 lb) weight loss in the last 6 months which is significant)     Body Fat Loss:   (moderate) Orbital, Triceps, Buccal region   Muscle Mass Loss:   (moderate) Clavicles (pectoralis & deltoids), Temples (temporalis), Calf (gastrocnemius), Hand (interosseous)  Fluid Accumulation:  No fluid accumulation     Strength:  Not Performed    Nutrition Assessment:     Pt. Improving from a nutritional standpoint AEB pt mentions has been consuming most of his magic cups despite varied intake 26-75% overall as well as wt is same as wt (2/15)  At risk for further nutritional compromise r/t admitted d/t chronic cough,severe malnutrition, recent stenting surgery for tracheomalacia, recent difficulty swallowing and weight loss. CT shower community- acquired pneumonia. Endoscopy on 2/18, GI signed off- SLP following for oropharyngeal dysphagia. Noted underlying medical condition (PMHx anxiety, CAD, Ganglion cyst, GERD, HLD, HTN, Major depression, osteoarthritis, RLS, urinary retention).     Nutrition Related Findings:    Pt. Report/Treatments/Miscellaneous: Pt seen, mentions appetite \" comes and goes\", likes magic cups & mentions he consumes most of them. Pt reports he heats up  Education/Counseling: Survival skills/brief education completed (Reviewed Dysphagia Soft & Bite Sized diet with pt & written materials. Pt mneitons on waiting list for meals on wheels- I gave pt written home delivered meal info as well)  Coordination of Nutrition Care: Continue to monitor while inpatient       Goals:  Goals: PO intake 75% or greater  Type of Goal: New goal  Previous Goal Met: Progressing toward Goal(s)    Nutrition Monitoring and Evaluation:      Food/Nutrient Intake Outcomes: Food and Nutrient Intake, Supplement Intake  Physical Signs/Symptoms Outcomes: Biochemical Data, GI Status, Fluid Status or Edema, Hemodynamic Status, Nutrition Focused Physical Findings, Weight    Discharge Planning:    Continue Oral Nutrition Supplement     Olive Crockett RD, LD  Contact: [phone]

## 2025-02-20 NOTE — PROGRESS NOTES
Physician Progress Note      PATIENT:               ALCON MONTIEL  CSN #:                  [id number]  :                       1950  ADMIT DATE:       2/15/2025 6:31 PM  DISCH DATE:  RESPONDING  PROVIDER #:        Rufus Grullon DO          QUERY TEXT:    Patient admitted with community-acquired pneumonia. Noted to have dietician   assessment with severe malnutrition diagnosis in 25 registered dietitian   note. If possible, please document in progress notes and discharge summary if   you are evaluating and /or treating any of the following:    The medical record reflects the following:  Risk Factors: tracheomalacia, difficulty swallowing, Major depressive disorder  Clinical Indicators: per 25 RD note \"Malnutrition Status: Severe   malnutrition, Context: Chronic Illness, Energy Intake 75% or less estimated   energy requirements for 1 month or longer,  Weight Loss Greater than 10% over   6 months (12.3% (23 lb) weight loss in the last 6 months which is significant)  Body Fat Loss (moderate) Orbital, Triceps, Buccal region, Muscle Mass Loss:     (moderate) Clavicles (pectoralis & deltoids), Temples (temporalis), Calf   (gastrocnemius), Hand (interosseous)  Treatment: Comprehensive Nutrition Assessment completed by registered   dietitian, Magic Cups TID, Remeron        ASPEN Criteria:    https://aspenjournals.onlinelibrary.green.com/doi/full/10.1177/167306668988892  5  Options provided:  -- Protein calorie malnutrition severe  -- Other - I will add my own diagnosis  -- Disagree - Not applicable / Not valid  -- Disagree - Clinically unable to determine / Unknown  -- Refer to Clinical Documentation Reviewer    PROVIDER RESPONSE TEXT:    This patient has severe protein calorie malnutrition.    Query created by: WASHINGTON TRINH on 2025 10:45 AM      Electronically signed by:  Rufus Grullon DO 2025 2:32 PM

## 2025-02-20 NOTE — PLAN OF CARE
Problem: Discharge Planning  Goal: Discharge to home or other facility with appropriate resources  2/19/2025 1931 by Candelario Moctezuma RN  Outcome: Progressing  Flowsheets (Taken 2/19/2025 1931)  Discharge to home or other facility with appropriate resources:   Identify barriers to discharge with patient and caregiver   Identify discharge learning needs (meds, wound care, etc)     Problem: Safety - Adult  Goal: Free from fall injury  2/19/2025 1931 by Candelario Moctezuma RN  Outcome: Progressing  Flowsheets (Taken 2/19/2025 1931)  Free From Fall Injury: Instruct family/caregiver on patient safety     Problem: Pain  Goal: Verbalizes/displays adequate comfort level or baseline comfort level  2/19/2025 1931 by Candelario Moctezuma RN  Outcome: Progressing  Flowsheets (Taken 2/19/2025 1931)  Verbalizes/displays adequate comfort level or baseline comfort level:   Encourage patient to monitor pain and request assistance   Administer analgesics based on type and severity of pain and evaluate response     Problem: Respiratory - Adult  Goal: Achieves optimal ventilation and oxygenation  2/19/2025 1931 by Candelario Moctezuma RN  Outcome: Progressing  Flowsheets (Taken 2/19/2025 1931)  Achieves optimal ventilation and oxygenation:   Assess for changes in respiratory status   Position to facilitate oxygenation and minimize respiratory effort   Initiate smoking cessation protocol as indicated     Problem: Self Harm/Suicidality  Goal: Will have no self-injury during hospital stay  Description: INTERVENTIONS:  1.  Ensure constant observer at bedside with Q15M safety checks  2.  Maintain a safe environment  3.  Secure patient belongings  4.  Ensure family/visitors adhere to safety recommendations  5.  Ensure safety tray has been added to patient's diet order  6.  Every shift and PRN: Re-assess suicidal risk via Frequent Screener    2/19/2025 1931 by Candelario Moctezuma, RN  Outcome: Progressing  Flowsheets (Taken 2/19/2025 1931)  Will have no  self-injury during hospital stay:   Ensure constant observer at bedside with Q15M safety checks   Maintain a safe environment   Ensure safety tray has been added to patient's diet order     Problem: Nutrition Deficit:  Goal: Optimize nutritional status  Outcome: Progressing  Flowsheets (Taken 2/19/2025 1931)  Nutrient intake appropriate for improving, restoring, or maintaining nutritional needs:   Assess nutritional status and recommend course of action   Order, calculate, and assess calorie counts as needed   Monitor oral intake, labs, and treatment plans

## 2025-02-21 VITALS
SYSTOLIC BLOOD PRESSURE: 148 MMHG | BODY MASS INDEX: 23.42 KG/M2 | OXYGEN SATURATION: 97 % | HEART RATE: 80 BPM | TEMPERATURE: 97.9 F | HEIGHT: 69 IN | WEIGHT: 158.1 LBS | DIASTOLIC BLOOD PRESSURE: 79 MMHG | RESPIRATION RATE: 18 BRPM

## 2025-02-21 LAB
ANION GAP SERPL CALC-SCNC: 14 MEQ/L (ref 8–16)
BACTERIA BLD AEROBE CULT: NORMAL
BACTERIA BLD AEROBE CULT: NORMAL
BUN SERPL-MCNC: 10 MG/DL (ref 7–22)
CALCIUM SERPL-MCNC: 9 MG/DL (ref 8.2–9.6)
CHLORIDE SERPL-SCNC: 104 MEQ/L (ref 98–111)
CO2 SERPL-SCNC: 24 MEQ/L (ref 23–33)
CREAT SERPL-MCNC: 0.7 MG/DL (ref 0.4–1.2)
DEPRECATED RDW RBC AUTO: 44.2 FL (ref 35–45)
ERYTHROCYTE [DISTWIDTH] IN BLOOD BY AUTOMATED COUNT: 13.1 % (ref 11.5–14.5)
GFR SERPL CREATININE-BSD FRML MDRD: > 90 ML/MIN/1.73M2
GLUCOSE BLD STRIP.AUTO-MCNC: 105 MG/DL (ref 70–108)
GLUCOSE SERPL-MCNC: 95 MG/DL (ref 70–108)
HCT VFR BLD AUTO: 36.2 % (ref 42–52)
HGB BLD-MCNC: 11.6 GM/DL (ref 14–18)
MCH RBC QN AUTO: 29.6 PG (ref 26–33)
MCHC RBC AUTO-ENTMCNC: 32 GM/DL (ref 32.2–35.5)
MCV RBC AUTO: 92.3 FL (ref 80–94)
PLATELET # BLD AUTO: 179 THOU/MM3 (ref 130–400)
PMV BLD AUTO: 11.4 FL (ref 9.4–12.4)
POTASSIUM SERPL-SCNC: 4.7 MEQ/L (ref 3.5–5.2)
RBC # BLD AUTO: 3.92 MILL/MM3 (ref 4.7–6.1)
SODIUM SERPL-SCNC: 142 MEQ/L (ref 135–145)
WBC # BLD AUTO: 6.3 THOU/MM3 (ref 4.8–10.8)

## 2025-02-21 PROCEDURE — 2500000003 HC RX 250 WO HCPCS

## 2025-02-21 PROCEDURE — 2700000000 HC OXYGEN THERAPY PER DAY

## 2025-02-21 PROCEDURE — 99239 HOSP IP/OBS DSCHRG MGMT >30: CPT

## 2025-02-21 PROCEDURE — 6370000000 HC RX 637 (ALT 250 FOR IP)

## 2025-02-21 PROCEDURE — 6370000000 HC RX 637 (ALT 250 FOR IP): Performed by: STUDENT IN AN ORGANIZED HEALTH CARE EDUCATION/TRAINING PROGRAM

## 2025-02-21 PROCEDURE — 94761 N-INVAS EAR/PLS OXIMETRY MLT: CPT

## 2025-02-21 PROCEDURE — 82948 REAGENT STRIP/BLOOD GLUCOSE: CPT

## 2025-02-21 PROCEDURE — 94660 CPAP INITIATION&MGMT: CPT

## 2025-02-21 PROCEDURE — 85027 COMPLETE CBC AUTOMATED: CPT

## 2025-02-21 PROCEDURE — 36415 COLL VENOUS BLD VENIPUNCTURE: CPT

## 2025-02-21 PROCEDURE — 80048 BASIC METABOLIC PNL TOTAL CA: CPT

## 2025-02-21 RX ORDER — METOPROLOL SUCCINATE 25 MG/1
25 TABLET, EXTENDED RELEASE ORAL DAILY
Qty: 30 TABLET | Refills: 0 | Status: SHIPPED | OUTPATIENT
Start: 2025-02-21

## 2025-02-21 RX ORDER — GUAIFENESIN 600 MG/1
600 TABLET, EXTENDED RELEASE ORAL 2 TIMES DAILY
Qty: 30 TABLET | Refills: 0 | Status: SHIPPED | OUTPATIENT
Start: 2025-02-21 | End: 2025-03-08

## 2025-02-21 RX ADMIN — PANTOPRAZOLE SODIUM 40 MG: 40 TABLET, DELAYED RELEASE ORAL at 05:58

## 2025-02-21 RX ADMIN — LISINOPRIL 40 MG: 40 TABLET ORAL at 09:32

## 2025-02-21 RX ADMIN — OXYBUTYNIN CHLORIDE 5 MG: 5 TABLET, EXTENDED RELEASE ORAL at 09:32

## 2025-02-21 RX ADMIN — ISOSORBIDE MONONITRATE 60 MG: 60 TABLET, EXTENDED RELEASE ORAL at 09:32

## 2025-02-21 RX ADMIN — GUAIFENESIN 600 MG: 600 TABLET ORAL at 09:32

## 2025-02-21 RX ADMIN — EZETIMIBE 10 MG: 10 TABLET ORAL at 09:32

## 2025-02-21 RX ADMIN — ASPIRIN 81 MG: 81 TABLET, CHEWABLE ORAL at 09:31

## 2025-02-21 RX ADMIN — PRASUGREL 5 MG: 10 TABLET, FILM COATED ORAL at 09:33

## 2025-02-21 RX ADMIN — GABAPENTIN 400 MG: 400 CAPSULE ORAL at 09:32

## 2025-02-21 RX ADMIN — TAMSULOSIN HYDROCHLORIDE 0.4 MG: 0.4 CAPSULE ORAL at 09:33

## 2025-02-21 RX ADMIN — AMLODIPINE BESYLATE 5 MG: 5 TABLET ORAL at 09:31

## 2025-02-21 RX ADMIN — SODIUM CHLORIDE, PRESERVATIVE FREE 10 ML: 5 INJECTION INTRAVENOUS at 09:33

## 2025-02-21 RX ADMIN — METOPROLOL SUCCINATE 25 MG: 25 TABLET, FILM COATED, EXTENDED RELEASE ORAL at 09:32

## 2025-02-21 ASSESSMENT — PAIN SCALES - GENERAL: PAINLEVEL_OUTOF10: 0

## 2025-02-21 NOTE — PROGRESS NOTES
[facility]  OCCUPATIONAL THERAPY MISSED TREATMENT NOTE  STRZ ICU STEPDOWN TELEMETRY 4K  4K-23/023-A      Date: 2025  Patient Name: Vladimir Muñiz        CSN: [id number]   : 1950  (74 y.o.)  Gender: male   Referring Practitioner: Behl, Ashita, MD            REASON FOR MISSED TREATMENT:  Patient d/c to home at this time.

## 2025-02-21 NOTE — PROGRESS NOTES
[facility]  PHYSICAL THERAPY MISSED TREATMENT NOTE  STRZ ICU STEPDOWN TELEMETRY 4K    Date: 2025  Patient Name: Vladimir Muñiz        MRN: [id number]   : 1950  (74 y.o.)  Gender: male   Referring Practitioner: Behl, Ashita, MD            REASON FOR MISSED TREATMENT:  Missed Treat.      Patient to d/c home at this time.

## 2025-02-21 NOTE — PLAN OF CARE
Problem: Discharge Planning  Goal: Discharge to home or other facility with appropriate resources  Outcome: Adequate for Discharge     Problem: Safety - Adult  Goal: Free from fall injury  Outcome: Adequate for Discharge     Problem: Pain  Goal: Verbalizes/displays adequate comfort level or baseline comfort level  Outcome: Adequate for Discharge     Problem: Respiratory - Adult  Goal: Achieves optimal ventilation and oxygenation  Outcome: Adequate for Discharge     Problem: Self Harm/Suicidality  Goal: Will have no self-injury during hospital stay  Description: INTERVENTIONS:  1.  Ensure constant observer at bedside with Q15M safety checks  2.  Maintain a safe environment  3.  Secure patient belongings  4.  Ensure family/visitors adhere to safety recommendations  5.  Ensure safety tray has been added to patient's diet order  6.  Every shift and PRN: Re-assess suicidal risk via Frequent Screener    Outcome: Adequate for Discharge     Problem: Nutrition Deficit:  Goal: Optimize nutritional status  Outcome: Adequate for Discharge   Care plan reviewed with patient.  Patient verbalizes understanding of the care plan and contributed to goal setting.

## 2025-02-21 NOTE — CARE COORDINATION
2/21/25, 9:33 AM EST    Patient goals/plan/ treatment preferences discussed by  and .  Patient goals/plan/ treatment preferences reviewed with patient/ family.  Patient/ family verbalize understanding of discharge plan and are in agreement with goal/plan/treatment preferences.  Understanding was demonstrated using the teach back method.  AVS provided by RN at time of discharge, which includes all necessary medical information pertaining to the patients current course of illness, treatment, post-discharge goals of care, and treatment preferences.     Services At/After Discharge: [facility]      Vladimir is returning home with his daughter, for whom he cares for. New  for PT/OT aide. [facility] has accepted patient.

## 2025-02-21 NOTE — PROGRESS NOTES
[facility]  OCCUPATIONAL THERAPY MISSED TREATMENT NOTE  STRZ ICU STEPDOWN TELEMETRY 4K  4K-23/023-A      Date: 2025  Patient Name: Vladimir Muñiz        CSN: [id number]   : 1950  (74 y.o.)  Gender: male   Referring Practitioner: Behl, Ashita, MD            REASON FOR MISSED TREATMENT: Patient Unavailable with RT at this time, will attempt next available time.

## 2025-02-21 NOTE — DISCHARGE INSTR - DIET
Recommended soft and bite size diet.     Good nutrition is important when healing from an illness, injury, or surgery.  Follow any nutrition recommendations given to you during your hospital stay.   If you were given an oral nutrition supplement while in the hospital, continue to take this supplement at home.  You can take it with meals, in-between meals, and/or before bedtime. These supplements can be purchased at most local grocery stores, pharmacies, and chain [employer]-stores.   If you have any questions about your diet or nutrition, call the hospital and ask for the dietitian.       Learning About the Diet for Swallowing Problems  What are swallowing problems?  Difficulty swallowing is also called dysphagia (say \"iza-BYV-gtk-uh\"). It is most often a sign of a problem with your throat or esophagus. This is the tube that moves food and liquids from the back of your mouth to your stomach.  Trouble swallowing can occur when the muscles and nerves that move food through the throat and esophagus are not working right. To help you swallow food, your doctor or speech therapist may advise a special dysphagia diet for you.  Why is a special diet important?  A dysphagia diet can help you handle some problems that can occur when it's hard to swallow food and liquids easily. These problems can include:  Malnutrition. This means you aren't getting enough healthy foods to keep your body working well.  Dehydration. This means you aren't getting enough liquids to keep your body healthy.  Aspiration. This means that food, liquid, or saliva goes down your windpipe (trachea) into your lungs, instead of down your esophagus to your stomach. This can lead to aspiration pneumonia, which is an inflammation of the lungs.  What is the dysphagia diet?  In the dysphagia diet, you change the foods you eat and the liquids you drink to make it easier to swallow them. You may:  Change the texture of the foods you eat. Your doctor or speech  therapist may advise you to eat one of these types of foods:  Easy-to-chew foods. These are foods that are soft or tender.  Soft and bite-sized foods. These are soft foods that have been cut into small pieces.  Minced and moist foods. These are very soft, small, and moist lumps of food that need very little chewing.  Pureed foods. These are foods that have been blended smooth. The puree must be thick enough to hold its shape on a spoon. These foods don't need to be chewed.  Liquidized foods. These are foods that have been blended smooth but are not as thick as pureed foods. You can drink them from a cup.  Thicken the liquids you drink. Your doctor or speech therapist will tell you what kind of thickener to use and how thick to make the liquids.  Slightly thick liquids. These are thicker than water but can flow through a straw.  Mildly thick liquids. These can be sipped from a cup but take some effort to drink with a straw.  Moderately thick liquids. These liquids are thick enough to drink from a cup or from a spoon. But they are hard to drink through a wide straw.  Extremely thick liquids. These are thick enough to hold their shape on a spoon. They are too thick to drink from a cup or suck through a straw.  Your speech therapist will help you learn exercises to train your muscles to work together so you can swallow. You may also need to learn how to position your body or how to put food in your mouth to be able to swallow better.  Follow-up care is a key part of your treatment and safety. Be sure to make and go to all appointments, and call your doctor if you are having problems. It's also a good idea to know your test results and keep a list of the medicines you take.  Current as of: October 24, 2023  Content Version: 14.3  © 2024 [employer].   Care instructions adapted under license by [employer]. If you have questions about a medical condition or this instruction, always ask your healthcare

## 2025-02-21 NOTE — PLAN OF CARE
Problem: Discharge Planning  Goal: Discharge to home or other facility with appropriate resources  Outcome: Progressing  Flowsheets (Taken 2/20/2025 1930)  Discharge to home or other facility with appropriate resources:   Identify barriers to discharge with patient and caregiver   Identify discharge learning needs (meds, wound care, etc)   Arrange for needed discharge resources and transportation as appropriate     Problem: Safety - Adult  Goal: Free from fall injury  Outcome: Progressing  Flowsheets (Taken 2/20/2025 1930)  Free From Fall Injury: Instruct family/caregiver on patient safety     Problem: Pain  Goal: Verbalizes/displays adequate comfort level or baseline comfort level  Outcome: Progressing  Flowsheets (Taken 2/20/2025 1930)  Verbalizes/displays adequate comfort level or baseline comfort level:   Encourage patient to monitor pain and request assistance   Administer analgesics based on type and severity of pain and evaluate response   Assess pain using appropriate pain scale     Problem: Respiratory - Adult  Goal: Achieves optimal ventilation and oxygenation  Outcome: Progressing  Flowsheets (Taken 2/20/2025 1930)  Achieves optimal ventilation and oxygenation:   Assess for changes in respiratory status   Position to facilitate oxygenation and minimize respiratory effort   Initiate smoking cessation protocol as indicated   Assess for changes in mentation and behavior     Problem: Self Harm/Suicidality  Goal: Will have no self-injury during hospital stay  Description: INTERVENTIONS:  1.  Ensure constant observer at bedside with Q15M safety checks  2.  Maintain a safe environment  3.  Secure patient belongings  4.  Ensure family/visitors adhere to safety recommendations  5.  Ensure safety tray has been added to patient's diet order  6.  Every shift and PRN: Re-assess suicidal risk via Frequent Screener    Outcome: Progressing  Flowsheets (Taken 2/20/2025 1930)  Will have no self-injury during hospital

## 2025-02-21 NOTE — DISCHARGE SUMMARY
Resident Discharge Summary (Hospitalist)      Patient: Vladimir Muñiz 74 y.o. male  : 1950  MRN: [id number]   Account: [id number]   Patient's PCP: Eric Ramírez MD    Admit Date: 2/15/2025   Discharge Date: 2025      Admitting Physician: Ashita Behl, MD  Discharge Physician: Rufus JUNG DO     Note to PCP:  Tentatively plan for stent removal week of  -   Repeat PFTs with bronchoscopy before and after stent removal  Sotalol to be resumed by Dr. Tommy Piper [facility] cardiologist  Metoprolol succinate 25 mg in place of sotalol  Monitor for worsening of major depressive disorder  Monitor for ongoing oropharyngeal dysphagia, concern for aspiration  Heterogenous right, left middle lobe thyroid nodules noted on imaging -PCP to follow-up with TSH, T4    Discharge Diagnoses:  Community-acquired pneumonia: 2/15 CT chest bilateral lower lobe pulmonary opacities.  Blood cultures negative x 2.  Respiratory cultures shows contaminant.  Influenza/COVID-negative.  S/p ceftriaxone, azithromycin x 5 days  Tracheobronchomalacia s/p stent placement 2025:  Report ongoing nonproductive cough s/p stent -noted to have significant improvement in respiratory symptoms and ENT progress note.  ENT signed off  Plan for stent removal outpatient with ENT, pulmonology Dr Mcnulty, tentatively plan for week of  -   Oral/pharyngeal dysphagia: SLP and dietitian following.  GI consulted -EGD with dilation done on .  MBS  showed transient laryngeal penetration consistent with aspiration  Soft, bite-size diet, thin liquids  ENT consulted for possible epiglottic dysfunction-no acute intervention, outpatient follow-up  Atypical chest pain, elevated troponin: 30s x 2 on presentation elevated to 40s x 2.  EKG no ST elevation or depression.  Sotalol held d/t concerns for QT prolongation.   TTE EF 55-60%, stress test negative for ischemia.  Cardiology consulted  Telemetry  Metoprolol succinate

## 2025-02-24 NOTE — TELEPHONE ENCOUNTER
I spoke to Dr Meyers about this patient and he agreed that the patient could just keep the stent removal as planned at [facility]. I contacted Dr Mcnulty's office but [facility] was not available so Lise took a message and will let Trinity know the plan.

## 2025-02-26 ENCOUNTER — TELEPHONE (OUTPATIENT)
Dept: ENT CLINIC | Age: 75
End: 2025-02-26

## 2025-02-26 NOTE — TELEPHONE ENCOUNTER
Dr. 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   Please advise.

## 2025-03-17 ENCOUNTER — OFFICE VISIT (OUTPATIENT)
Dept: ENT CLINIC | Age: 75
End: 2025-03-17
Payer: COMMERCIAL

## 2025-03-17 VITALS
TEMPERATURE: 97.4 F | DIASTOLIC BLOOD PRESSURE: 60 MMHG | HEART RATE: 72 BPM | BODY MASS INDEX: 24.13 KG/M2 | HEIGHT: 69 IN | RESPIRATION RATE: 16 BRPM | OXYGEN SATURATION: 95 % | WEIGHT: 162.9 LBS | SYSTOLIC BLOOD PRESSURE: 100 MMHG

## 2025-03-17 DIAGNOSIS — R06.09 DYSPNEA ON EXERTION: ICD-10-CM

## 2025-03-17 DIAGNOSIS — J39.8 TRACHEOBRONCHOMALACIA DETERMINED BY BRONCHOSCOPY: Primary | ICD-10-CM

## 2025-03-17 PROCEDURE — 99213 OFFICE O/P EST LOW 20 MIN: CPT | Performed by: OTOLARYNGOLOGY

## 2025-03-17 PROCEDURE — 3078F DIAST BP <80 MM HG: CPT | Performed by: OTOLARYNGOLOGY

## 2025-03-17 PROCEDURE — 1123F ACP DISCUSS/DSCN MKR DOCD: CPT | Performed by: OTOLARYNGOLOGY

## 2025-03-17 PROCEDURE — 3074F SYST BP LT 130 MM HG: CPT | Performed by: OTOLARYNGOLOGY

## 2025-03-17 PROCEDURE — 1159F MED LIST DOCD IN RCRD: CPT | Performed by: OTOLARYNGOLOGY

## 2025-03-17 RX ORDER — DOXYCYCLINE 100 MG/1
CAPSULE ORAL
COMMUNITY
Start: 2025-02-26

## 2025-03-17 RX ORDER — NITROGLYCERIN 0.4 MG/1
TABLET SUBLINGUAL
COMMUNITY
Start: 2025-03-03

## 2025-03-17 RX ORDER — ASPIRIN 81 MG
TABLET, DELAYED RELEASE (ENTERIC COATED) ORAL
COMMUNITY
Start: 2025-01-23

## 2025-03-18 NOTE — PROGRESS NOTES
mouth nightly      hydrOXYzine HCl (ATARAX) 10 MG tablet TAKE 1 TABLET BY MOUTH ONCE DAILY AT BEDTIME AS NEEDED      prasugrel (EFFIENT) 10 MG TABS       sulfaSALAzine (AZULFIDINE) 500 MG tablet Take 1 tablet by mouth daily      terbinafine (LAMISIL) 250 MG tablet Take 1 tablet by mouth daily      isosorbide mononitrate (IMDUR) 60 MG extended release tablet Take 1 tablet by mouth every morning      calcium carbonate 600 MG TABS tablet Take 1 tablet by mouth daily      pravastatin (PRAVACHOL) 40 MG tablet TAKE ONE TABLET BY MOUTH ONCE DAILY 30 tablet 5    melatonin 3 MG TABS tablet Take 1 tablet by mouth daily 30 tablet 3    ezetimibe (ZETIA) 10 MG tablet Take 1 tablet by mouth daily      benazepril (LOTENSIN) 40 MG tablet Take 1 tablet by mouth daily 30 tablet 5    amLODIPine (NORVASC) 5 MG tablet TAKE ONE TABLET BY MOUTH ONCE DAILY 90 tablet 0    fish oil-omega-3 fatty acids 1000 MG capsule Take 1 capsule by mouth 2 times daily      therapeutic multivitamin-minerals (THERAGRAN-M) tablet Take 1 tablet by mouth daily      acetylcysteine (MUCOMYST) 20 % nebulizer solution Inhale 3 mLs into the lungs in the morning and 3 mLs in the evening. 180 mL 0     No current facility-administered medications for this visit.     Past Medical History:   Diagnosis Date    Anxiety 2/16/2025    Arrhythmia     CAD (coronary artery disease)     stents placed    Dizziness     Ganglion cyst     GERD (gastroesophageal reflux disease) 04/15/2014    Headache 01/15/2014    Hyperlipidemia     Hypertension     Major depression 12/15/2014    ROHIT (obstructive sleep apnea) 2/16/2025    Osteoarthritis     Restless legs syndrome     RLS (restless legs syndrome)     SOB (shortness of breath)     Urinary retention 1/7/2025      Past Surgical History:   Procedure Laterality Date    BACK SURGERY      BRONCHOSCOPY N/A 01/06/2025    Bronchoscopy with Tracheal Stent Placement performed by Gustavo Mcnulty DO at [facility] OR    CHOLECYSTECTOMY, LAPAROSCOPIC

## 2025-03-19 PROBLEM — R79.89 ELEVATED TROPONIN: Status: RESOLVED | Noted: 2025-02-17 | Resolved: 2025-03-19

## 2025-03-21 ENCOUNTER — RESULTS FOLLOW-UP (OUTPATIENT)
Dept: ENT CLINIC | Age: 75
End: 2025-03-21

## 2025-03-21 ENCOUNTER — OFFICE VISIT (OUTPATIENT)
Dept: UROLOGY | Age: 75
End: 2025-03-21

## 2025-03-21 VITALS
DIASTOLIC BLOOD PRESSURE: 56 MMHG | BODY MASS INDEX: 24.14 KG/M2 | SYSTOLIC BLOOD PRESSURE: 102 MMHG | WEIGHT: 163 LBS | HEIGHT: 69 IN

## 2025-03-21 DIAGNOSIS — R33.9 URINARY RETENTION: Primary | ICD-10-CM

## 2025-03-21 DIAGNOSIS — N40.1 BENIGN PROSTATIC HYPERPLASIA WITH LOWER URINARY TRACT SYMPTOMS, SYMPTOM DETAILS UNSPECIFIED: ICD-10-CM

## 2025-03-21 LAB
BILIRUBIN, URINE: NEGATIVE
BLOOD URINE, POC: NEGATIVE
CHARACTER, URINE: CLEAR
COLOR, UA: YELLOW
GLUCOSE URINE: NEGATIVE MG/DL
KETONES, URINE: ABNORMAL
LEUKOCYTE CLUMPS, URINE: ABNORMAL
NITRITE, URINE: NEGATIVE
PH, URINE: 7 (ref 5–9)
POST VOID RESIDUAL (PVR): 20 ML
PROTEIN, URINE: NEGATIVE MG/DL
SPECIFIC GRAVITY UA: 1.01 (ref 1–1.03)
UROBILINOGEN, URINE: 0.2 EU/DL (ref 0–1)

## 2025-03-21 NOTE — PATIENT INSTRUCTIONS
Start miralax 17 gm daily or colace 100mg daily.   Take a laxative when gets home today to help with constipation.     Need to Improve constipation.  Continue flomax 0.4mg twice a day  Continue oxybutynin 5mg daily.  However if dry mouth is bothersome can consider stopping this.  But if urinary symptoms worsen may need to consider adding back or trialing another medication.     Consider cystoscopy. Discussed.  At this time pt wants to to see if improving constipation improves symptoms.     Decrease tea intake as it can make urinary urgency dn frequency worse.     Call if urinary symptoms worsen and can consider cystoscopy.      Bladder irritants    For some people, certain foods may irritate the bladder, causing or making bladder symptoms worse. If symptoms are related to diet, avoiding highly acidic or spicy foods, alcohol, or carbonated drinks should bring relief after approximately 10 days. Once the symptoms have improved on a restricted diet, patients can gradually add these foods back into the diet. If one specific food does cause symptoms, it can be easily identified and avoided.     Foods that should be avoided:       Apples     Plums  Apple juice    Strawberries  [place]    Tea  Carbonated beverages   Tomatoes  Chilies/spicy foods   Vinegar  Chocolate    Vitamin B complex  Citrus fruits    Bananas  Coffee (including decaffeinated)  Saccharin sweetners (Sweet'n low)  Cranberries    Soy sauce  Grapes     Alcohol  Guava     Processed meat and fish  Nutrasweet    Tofu  Mayonnaise    Yogurt  Peaches    Pineapple

## 2025-03-21 NOTE — PROGRESS NOTES
[facility]ANS [place] [facility]  [facility] UROLOGY  [address]  [address]  [facility] [address]  Dept: [phone]  Loc: [phone]    Visit Date: 3/21/2025        HPI:   Vladimir is a 75 year old male seen for UTI, difficult ortega insertion, and urinary retention.     Seen at [facility] due to urinary retention, difficult ortega insertion and abnormal UA. 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 S/p Bronchoscopy with Tracheal Stent Placement 1/6/25  Urology consulted due to urinary retention.  Pt was unable to urinate. PVR 800ml per staff report.  14 Hebrew ortega was inserted with difficulty per staff report. Staff report difficulty passing prostate.  Pt denies issues urinating prior to this. Only complaint was intermittent dysuria for years.  Had good flow.  +constipation. No BM for 5 days.  No hematuria.  Never seen urology. Ortega with yellow urine with sediment. On high flow oxygen.  UA large blood, trace ketones, protein, small leuk.WBC 10-15.  Neg nitrites. RBC greater than 200.   Urine cx no growth. WBC 7.2 Cre 0.9.No family hx prostate or bladder cancer. Reports testicular surgery on right in 1972. Retention may be multifactorial from possible UTI, recent surgery, narcotic use, constipation and possbly enlarged prostate. Need to optimize constipation and allow flomax 72 hours before void trial can be attempted. If infection this should be treated.  Will follow up in 1 week with possible void trial as outpatient.    Ortega catheter was removed before discharged from hospital per pt request.     Urine cx GOC 1/14/25. Given keflex in ER and oxybutynin 5mg daily for ?UTI and bladder spasms. ER note reports normal bladder scan.     Seen 2/6/25 in wheelchair and by himself. PVR 211ml. UA trace luek, neg nitrites.  No blood. Flomax  0.4mg daily.  No hematuria.  Occasional dysuria, Last time a

## 2025-03-31 ENCOUNTER — TELEPHONE (OUTPATIENT)
Dept: ENT CLINIC | Age: 75
End: 2025-03-31

## 2025-03-31 NOTE — TELEPHONE ENCOUNTER
Malini and ELISABETH were called up to the front due to Tessa being concerned about patient with shortness of breathe. Malini and ELISABETH took vitals on the patient to see if he should go to ER for this. The patients o2 level was sitting at 96/97. Patients heart rate was 113. Patients blood pressure was 152/74. We let patient know that he vitals were not bad but still recommended he go to the ER or PCP if he was feeling this bad and patient declined and stated that he just wanted to go home and lay down, at that point patient left frustrated and asking why Dr. Meyers wouldn't see him. Tessa then tried explaining that we were just waiting to hear from him but ended up walking out.

## 2025-03-31 NOTE — TELEPHONE ENCOUNTER
Patient stopped into the office today to schedule an appointment. Upon chart review it looks like Dr. Meyers wanted to see him back in the office in 3 months (which the patient was not aware of) and have him call if he was having recurring or worsening issues before then. Patient states he is having shortness of breath. Patient states he can barely walk through his house without struggling to breathe. Upon entering the office his breathing does seem to be very labored from what I can tell as well. I did advise him to go to the ED if his breathing gets worse.

## 2025-03-31 NOTE — TELEPHONE ENCOUNTER
Patient called in stating he was told to make an appointment with Dr. Meyers if he started having shortness of breath. Patient states that he had his stent removed in February. Please advise

## 2025-04-02 ENCOUNTER — OFFICE VISIT (OUTPATIENT)
Dept: ENT CLINIC | Age: 75
End: 2025-04-02

## 2025-04-02 VITALS
RESPIRATION RATE: 20 BRPM | SYSTOLIC BLOOD PRESSURE: 130 MMHG | WEIGHT: 166.5 LBS | TEMPERATURE: 97.6 F | OXYGEN SATURATION: 96 % | HEART RATE: 112 BPM | HEIGHT: 69 IN | BODY MASS INDEX: 24.66 KG/M2 | DIASTOLIC BLOOD PRESSURE: 62 MMHG

## 2025-04-02 DIAGNOSIS — J41.8 MIXED SIMPLE AND MUCOPURULENT CHRONIC BRONCHITIS (HCC): ICD-10-CM

## 2025-04-02 DIAGNOSIS — R06.09 DYSPNEA ON EXERTION: ICD-10-CM

## 2025-04-02 DIAGNOSIS — J39.8 TRACHEOBRONCHOMALACIA DETERMINED BY BRONCHOSCOPY: Primary | ICD-10-CM

## 2025-04-02 NOTE — PROGRESS NOTES
[facility]IC[facility] [place] [facility]  [facility] EAR, NOSE AND THROAT  [address]  [facility] [address]  Dept: [phone]  Dept Fax: [phone]  Loc: [phone]    Vladimir Muñiz is a 75 y.o. male who was referred by No ref. provider found for:  Chief Complaint   Patient presents with    Follow-up     Bronch per Dr. Meyers.  He reports feeling lousy.  He is easily out of breath and stops within sentences to take a breath.  He said it started Thurs afternoon when he started getting short of breath.       HPI:        History of Present Illness  Vladimir Muñiz is a 75 y.o. 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  The patient had a 1 month experience of a Y stent placed in his distal trachea and mainstem bronchi which he had in position on his last visit.  His equivocation of the benefit of that stent is now in hernandez relief because he recognizes that his dyspnea, that was severe, as thoroughly returned after the removal of the stent.  He now is keenly aware of how impaired he was before the stent was placed and wants very much to have a new stent placed or something to be done for his condition.    He reports experiencing severe dyspnea like he had previously, necessitating intermittent pauses to catch his breath sometimes only after taking a few steps. This symptom onset was observed on the previous Thursday during a home respiratory therapy session. His RT advised him to rest and engage in passive activities such as watching television. He attempted to contact our office on Friday, unaware of the office closure, and subsequently reached out again on Tuesday when he got through it was added to today's schedule. He also notes an increase in his his mucus production, requiring the use of a box of tissues every other day. He describes a

## 2025-04-04 ENCOUNTER — TELEPHONE (OUTPATIENT)
Dept: ENT CLINIC | Age: 75
End: 2025-04-04

## 2025-04-04 LAB
BACTERIA SPEC RESP CULT: NORMAL
GRAM STN SPEC: NORMAL

## 2025-04-04 NOTE — TELEPHONE ENCOUNTER
Tried to reach Juana at Dr Mcnulty's office to coordinate scheduling surgery for Vladimir. Spoke to Lasha, he said she was in procedures today so he will have her call me back.

## 2025-04-15 ENCOUNTER — OFFICE VISIT (OUTPATIENT)
Dept: CARDIOLOGY CLINIC | Age: 75
End: 2025-04-15
Payer: MEDICARE

## 2025-04-15 VITALS
DIASTOLIC BLOOD PRESSURE: 82 MMHG | HEART RATE: 87 BPM | WEIGHT: 165 LBS | BODY MASS INDEX: 24.44 KG/M2 | SYSTOLIC BLOOD PRESSURE: 140 MMHG | HEIGHT: 69 IN

## 2025-04-15 DIAGNOSIS — I25.10 CORONARY ARTERY DISEASE INVOLVING NATIVE CORONARY ARTERY OF NATIVE HEART WITHOUT ANGINA PECTORIS: ICD-10-CM

## 2025-04-15 DIAGNOSIS — E78.5 HYPERLIPIDEMIA, UNSPECIFIED HYPERLIPIDEMIA TYPE: Primary | ICD-10-CM

## 2025-04-15 DIAGNOSIS — I10 PRIMARY HYPERTENSION: ICD-10-CM

## 2025-04-15 DIAGNOSIS — Z95.5 HISTORY OF PLACEMENT OF STENT IN LAD CORONARY ARTERY: ICD-10-CM

## 2025-04-15 DIAGNOSIS — R06.09 DYSPNEA ON EXERTION: ICD-10-CM

## 2025-04-15 DIAGNOSIS — R94.31 ABNORMAL ECG: ICD-10-CM

## 2025-04-15 DIAGNOSIS — I35.1 NONRHEUMATIC AORTIC VALVE INSUFFICIENCY: ICD-10-CM

## 2025-04-15 PROCEDURE — 1123F ACP DISCUSS/DSCN MKR DOCD: CPT | Performed by: INTERNAL MEDICINE

## 2025-04-15 PROCEDURE — 3079F DIAST BP 80-89 MM HG: CPT | Performed by: INTERNAL MEDICINE

## 2025-04-15 PROCEDURE — 3077F SYST BP >= 140 MM HG: CPT | Performed by: INTERNAL MEDICINE

## 2025-04-15 PROCEDURE — 99214 OFFICE O/P EST MOD 30 MIN: CPT | Performed by: INTERNAL MEDICINE

## 2025-04-15 PROCEDURE — 1036F TOBACCO NON-USER: CPT | Performed by: INTERNAL MEDICINE

## 2025-04-15 PROCEDURE — 93000 ELECTROCARDIOGRAM COMPLETE: CPT | Performed by: INTERNAL MEDICINE

## 2025-04-15 PROCEDURE — G8420 CALC BMI NORM PARAMETERS: HCPCS | Performed by: INTERNAL MEDICINE

## 2025-04-15 PROCEDURE — G8427 DOCREV CUR MEDS BY ELIG CLIN: HCPCS | Performed by: INTERNAL MEDICINE

## 2025-04-15 PROCEDURE — 1159F MED LIST DOCD IN RCRD: CPT | Performed by: INTERNAL MEDICINE

## 2025-04-15 NOTE — PROGRESS NOTES
Patient is here for a 8 week hospital follow up    EKG completed    Denies any cardiac symtpoms    Reports he follows Dr. Sanders at [facility].

## 2025-04-15 NOTE — PATIENT INSTRUCTIONS
Your nurse today was IMMANUEL Fierro  Your doctor today was Dr. Montemayor  Our phone number [phone]

## 2025-04-21 RX ORDER — SODIUM CHLORIDE 9 MG/ML
INJECTION, SOLUTION INTRAVENOUS CONTINUOUS
Status: DISCONTINUED | OUTPATIENT
Start: 2025-04-21 | End: 2025-04-22 | Stop reason: HOSPADM

## 2025-04-21 RX ORDER — SODIUM CHLORIDE 0.9 % (FLUSH) 0.9 %
5-40 SYRINGE (ML) INJECTION PRN
Status: DISCONTINUED | OUTPATIENT
Start: 2025-04-21 | End: 2025-04-22 | Stop reason: HOSPADM

## 2025-04-21 RX ORDER — SODIUM CHLORIDE 0.9 % (FLUSH) 0.9 %
5-40 SYRINGE (ML) INJECTION EVERY 12 HOURS SCHEDULED
Status: DISCONTINUED | OUTPATIENT
Start: 2025-04-21 | End: 2025-04-22 | Stop reason: HOSPADM

## 2025-04-21 RX ORDER — SODIUM CHLORIDE 9 MG/ML
25 INJECTION, SOLUTION INTRAVENOUS PRN
Status: DISCONTINUED | OUTPATIENT
Start: 2025-04-21 | End: 2025-04-22 | Stop reason: HOSPADM

## 2025-04-22 ENCOUNTER — HOSPITAL ENCOUNTER (OUTPATIENT)
Age: 75
Setting detail: OUTPATIENT SURGERY
Discharge: HOME | End: 2025-04-22
Attending: INTERNAL MEDICINE | Admitting: INTERNAL MEDICINE
Payer: MEDICARE

## 2025-04-22 ENCOUNTER — ANESTHESIA EVENT (OUTPATIENT)
Dept: ENDOSCOPY | Age: 75
End: 2025-04-22
Payer: MEDICARE

## 2025-04-22 ENCOUNTER — APPOINTMENT (OUTPATIENT)
Dept: ENDOSCOPY | Age: 75
End: 2025-04-22
Attending: INTERNAL MEDICINE
Payer: MEDICARE

## 2025-04-22 ENCOUNTER — ANESTHESIA (OUTPATIENT)
Dept: ENDOSCOPY | Age: 75
End: 2025-04-22
Payer: MEDICARE

## 2025-04-22 VITALS
WEIGHT: 170 LBS | SYSTOLIC BLOOD PRESSURE: 134 MMHG | HEIGHT: 69 IN | OXYGEN SATURATION: 97 % | DIASTOLIC BLOOD PRESSURE: 61 MMHG | BODY MASS INDEX: 25.18 KG/M2 | TEMPERATURE: 97.2 F | HEART RATE: 82 BPM | RESPIRATION RATE: 16 BRPM

## 2025-04-22 PROCEDURE — 2580000003 HC RX 258: Performed by: INTERNAL MEDICINE

## 2025-04-22 PROCEDURE — 6360000002 HC RX W HCPCS

## 2025-04-22 PROCEDURE — 3609027000 HC COLONOSCOPY: Performed by: INTERNAL MEDICINE

## 2025-04-22 PROCEDURE — 7100000010 HC PHASE II RECOVERY - FIRST 15 MIN: Performed by: INTERNAL MEDICINE

## 2025-04-22 PROCEDURE — 2709999900 HC NON-CHARGEABLE SUPPLY: Performed by: INTERNAL MEDICINE

## 2025-04-22 PROCEDURE — 3700000001 HC ADD 15 MINUTES (ANESTHESIA): Performed by: INTERNAL MEDICINE

## 2025-04-22 PROCEDURE — 3700000000 HC ANESTHESIA ATTENDED CARE: Performed by: INTERNAL MEDICINE

## 2025-04-22 PROCEDURE — 7100000011 HC PHASE II RECOVERY - ADDTL 15 MIN: Performed by: INTERNAL MEDICINE

## 2025-04-22 RX ORDER — PROPOFOL 10 MG/ML
INJECTION, EMULSION INTRAVENOUS
Status: DISCONTINUED | OUTPATIENT
Start: 2025-04-22 | End: 2025-04-22 | Stop reason: SDUPTHER

## 2025-04-22 RX ADMIN — PROPOFOL 20 MG: 10 INJECTION, EMULSION INTRAVENOUS at 14:11

## 2025-04-22 RX ADMIN — PROPOFOL 100 MG: 10 INJECTION, EMULSION INTRAVENOUS at 13:55

## 2025-04-22 RX ADMIN — SODIUM CHLORIDE: 9 INJECTION, SOLUTION INTRAVENOUS at 13:47

## 2025-04-22 RX ADMIN — PROPOFOL 40 MG: 10 INJECTION, EMULSION INTRAVENOUS at 14:05

## 2025-04-22 RX ADMIN — PROPOFOL 50 MG: 10 INJECTION, EMULSION INTRAVENOUS at 13:59

## 2025-04-22 ASSESSMENT — PAIN - FUNCTIONAL ASSESSMENT
PAIN_FUNCTIONAL_ASSESSMENT: NONE - DENIES PAIN

## 2025-04-22 ASSESSMENT — ENCOUNTER SYMPTOMS: SHORTNESS OF BREATH: 1

## 2025-04-22 NOTE — ANESTHESIA PRE PROCEDURE
Department of Anesthesiology  Preprocedure Note       Name:  Vladimir Muñiz   Age:  75 y.o.  :  1950                                          MRN:  [id number]         Date:  2025      Surgeon: Surgeon(s):  Clyde Camarena MD    Procedure: Procedure(s):  COLONOSCOPY    Medications prior to admission:   Prior to Admission medications    Medication Sig Start Date End Date Taking? Authorizing Provider   nitroGLYCERIN (NITROSTAT) 0.4 MG SL tablet  3/3/25   Tien Luke MD   doxycycline hyclate (VIBRAMYCIN) 100 MG capsule Take by mouth 25   Tien Luke MD   calcium carb-cholecalciferol (CALCIUM 600 + D) 600-5 MG-MCG TABS tablet Take by mouth 25   Tien Luke MD   metoprolol succinate (TOPROL XL) 25 MG extended release tablet Take 1 tablet by mouth daily 25   Rogelio Castaneda MD   aspirin 81 MG EC tablet Take 1 tablet by mouth daily    Tien Lkue MD   tamsulosin (FLOMAX) 0.4 MG capsule Take 1 capsule by mouth 2 times daily 25   Belkys Forbes APRN - CNP   oxyBUTYnin (DITROPAN XL) 5 MG extended release tablet Take 1 tablet by mouth daily 25   Basil Brown MD   albuterol (PROVENTIL) (2.5 MG/3ML) 0.083% nebulizer solution Take 3 mLs by nebulization in the morning and at bedtime 25   Conchita Cedeno APRN - CNP   acetylcysteine (MUCOMYST) 20 % nebulizer solution Inhale 3 mLs into the lungs in the morning and 3 mLs in the evening. 25  Conchita Cedeno APRN - CNP   sodium chloride, Inhalant, 3 % nebulizer solution Take 4 mLs by nebulization as needed for Other (For thick secretions unable to expel with 0.9% saline) 25   Conchita Cedeno APRN - CNP   gabapentin (NEURONTIN) 400 MG capsule Take 1 capsule by mouth 3 times daily.    Tien Luke MD   omeprazole (PRILOSEC) 40 MG delayed release capsule Take 1 capsule by mouth daily    Tien Luke MD   amitriptyline (ELAVIL) 50 MG tablet Take 1 tablet by

## 2025-04-22 NOTE — POST SEDATION
[facility]  Sedation/Analgesia Post Sedation Record    Patient: Vladimir Muñzi : 1950  [facility] Rec#: [id number] Acc#: [id number]   Procedure Performed By: Clyde Camarena MD  Primary Care Physician: Eric Ramírez MD    POST-PROCEDURE    Physicians/Assistants: Clyde Camarena MD  Procedure Performed:    Sedation/Anesthesia:     Estimated Blood Loss:          ml  Specimens Removed:  [x]None []Other:      Disposition of Specimen:  []Pathology [x]Other      Complications:   [x]None Immediate []Other:     Post-procedure Diagnosis/Findings:             Recommendations:   polyp            Clyde Camarena MD, MD FACG  Electronically signed 2025 at 2:21 PM

## 2025-04-22 NOTE — H&P
[facility]  Sedation/Analgesia History & Physical    Patient: Vladimir Muñiz : 1950  Med Rec#: [id number] Acc#: [id number]   Provider Performing Procedure: Clyde Camarena MD  Primary Care Physician: Eric Ramírez MD    PRE-PROCEDURE   Full CODE [x]Yes  DNR-CCA/DNR-CC []Yes   Brief History/Pre-Procedure Diagnosis:polyp          MEDICAL HISTORY  []CAD/Valve  []Liver Disease  []Lung Disease []Diabetes  []Hypertension []Renal Disease  []Additional information:       has a past medical history of Anxiety, Arrhythmia, CAD (coronary artery disease), Dizziness, Ganglion cyst, GERD (gastroesophageal reflux disease), Headache, Hyperlipidemia, Hypertension, Major depression, ROHIT (obstructive sleep apnea), Osteoarthritis, Restless legs syndrome, RLS (restless legs syndrome), SOB (shortness of breath), and Urinary retention.    SURGICAL HISTORY   has a past surgical history that includes Testicle surgery; Cholecystectomy, laparoscopic; Cholecystectomy, laparoscopic; back surgery; Neck surgery; Lung removal, total (N/A); laryngoscopy (N/A, 2025); bronchoscopy (N/A, 2025); and Upper gastrointestinal endoscopy (N/A, 2025).  Additional information:       ALLERGIES   Allergies as of 2025 - Fully Reviewed 2025   Allergen Reaction Noted    Sildenafil citrate Other (See Comments) 10/18/2011    Cephalexin Other (See Comments) 2024    Baycol [cerivastatin sodium] Other (See Comments) 10/18/2011    Cetirizine & related  10/18/2011    Cialis [tadalafil] Nausea Only 2011    Crestor [rosuvastatin calcium] Other (See Comments) 10/18/2011    Lipitor  10/18/2011    Ropinirole hcl Nausea And Vomiting 2013    Zocor [simvastatin] Other (See Comments) 10/18/2011     Additional information:       MEDICATIONS   Coumadin Use Last 7 Days [x]No []Yes  Antiplatelet drug therapy use last 7 days  [x]No []Yes  Other anticoagulant use last 7 days  [x]No []Yes    Current

## 2025-04-22 NOTE — PROCEDURES
[facility]                 [address] [place] [address]                             PROCEDURE NOTE      PATIENT NAME: ALCON MONTIEL                 : 1950  MED REC NO: [id number]                       ROOM: [facility]  ACCOUNT NO: [id number]                       ADMIT DATE: 2025  PROVIDER: Santi Camarena MD      DATE OF PROCEDURE:  2025    SURGEON:  Santi Camarena MD    PROCEDURE:  Colonoscopy.    INDICATIONS FOR PROCEDURE:  High-risk screening colon, previous history of adenoma.  See the list of medicine attached.  Alert, oriented x3.  Vitals are stable.  .  See the preop note for rest of clinicals.  The patient is not sure about the antiplatelet, family not present.    ASA CLASSIFICATION:  III.    MEDICATIONS:  Per Anesthesia.    BIOPSY:  None.    PHOTOGRAPHS:  Yes.    BLOOD LOSS:  None.    DESCRIPTION OF PROCEDURE:  Informed consent was obtained after explaining risks and benefits of the procedure and conscious sedation.  Possible complications including bleeding, perforation, reaction to medicine, but not limiting to death, were discussed.    Afterwards, CFQ-180 colonoscope was advanced to the cecum.  Landmarks were identified.  The patient having very poor prep precluding a good exam.  Polyps larger than a centimeter can be missed.  The scope was withdrawn.  No obvious polyp or mass could be seen in the cecum, ascending colon, transverse colon, descending colon, sigmoid and rectum.  Poor prep precludes an exam and polyps or mass larger than a centimeter can be missed.    IMPRESSION:  Poor prep, history of adenoma in the past.    RECOMMENDATIONS:  No further exam as the patient's dementia has progressed and I feel with his age and health, we should not pursue anymore exam and had a good discussion with the patient and family afterwards.          SANTI CAMARENA MD      D:  2025 14:21:30     T:  2025 14:44:58     SAVANNAH/LUIS  Job #:

## 2025-04-22 NOTE — PROGRESS NOTES
Pt in phase 2 of recovery. Denies pain. Vitals stable. Tolerating fluids p.o, IV removed.     Dr. Camarena in to speak with patient at bedside.     Discharge instructions given, discharged home via [facility]

## 2025-04-22 NOTE — ANESTHESIA POSTPROCEDURE EVALUATION
Department of Anesthesiology  Postprocedure Note    Patient: Vladimir Muñiz  MRN: [id number]  YOB: 1950  Date of evaluation: 4/22/2025    Procedure Summary       Date: 04/22/25 Room / Location: [place] [address] / [facility]    Anesthesia Start: 1350 Anesthesia Stop: 1418    Procedure: COLONOSCOPY Diagnosis:       History of colon polyps      Encounter for screening colonoscopy      (History of colon polyps [Z86.0100])      (Encounter for screening colonoscopy [Z12.11])    Surgeons: Clyde Camarena MD Responsible Provider: Javier Daugherty DO    Anesthesia Type: MAC ASA Status: 3            Anesthesia Type: No value filed.    Koby Phase I: Koby Score: 10    Koby Phase II:      Anesthesia Post Evaluation    Patient location during evaluation: bedside  Patient participation: complete - patient participated  Level of consciousness: awake  Pain score: 0  Airway patency: patent  Nausea & Vomiting: no nausea and no vomiting  Cardiovascular status: hemodynamically stable and blood pressure returned to baseline  Respiratory status: room air, nonlabored ventilation and spontaneous ventilation  Hydration status: stable        No notable events documented.

## 2025-05-13 ENCOUNTER — PREP FOR PROCEDURE (OUTPATIENT)
Dept: ENT CLINIC | Age: 75
End: 2025-05-13

## 2025-05-13 DIAGNOSIS — Z01.818 PRE-OP TESTING: ICD-10-CM

## 2025-05-13 DIAGNOSIS — J39.8 TRACHEOBRONCHOMALACIA DETERMINED BY BRONCHOSCOPY: Primary | ICD-10-CM

## 2025-05-13 DIAGNOSIS — R06.09 DYSPNEA ON EXERTION: ICD-10-CM

## 2025-05-27 ENCOUNTER — HOSPITAL ENCOUNTER (OUTPATIENT)
Age: 75
Discharge: HOME | End: 2025-05-27
Payer: MEDICARE

## 2025-05-27 DIAGNOSIS — R06.09 DYSPNEA ON EXERTION: ICD-10-CM

## 2025-05-27 DIAGNOSIS — J39.8 TRACHEOBRONCHOMALACIA DETERMINED BY BRONCHOSCOPY: ICD-10-CM

## 2025-05-27 DIAGNOSIS — Z01.818 PRE-OP TESTING: ICD-10-CM

## 2025-05-27 LAB
ALBUMIN SERPL BCG-MCNC: 3.9 G/DL (ref 3.4–4.9)
ALP SERPL-CCNC: 71 U/L (ref 40–129)
ALT SERPL W/O P-5'-P-CCNC: 14 U/L (ref 10–50)
ANION GAP SERPL CALC-SCNC: 9 MEQ/L (ref 8–16)
AST SERPL-CCNC: 22 U/L (ref 10–50)
BASOPHILS ABSOLUTE: 0 THOU/MM3 (ref 0–0.1)
BASOPHILS NFR BLD AUTO: 0.7 %
BILIRUB SERPL-MCNC: 0.3 MG/DL (ref 0.3–1.2)
BUN SERPL-MCNC: 29 MG/DL (ref 8–23)
CALCIUM SERPL-MCNC: 9.2 MG/DL (ref 8.8–10.2)
CHLORIDE SERPL-SCNC: 109 MEQ/L (ref 98–111)
CO2 SERPL-SCNC: 25 MEQ/L (ref 22–29)
CREAT SERPL-MCNC: 1.2 MG/DL (ref 0.7–1.2)
DEPRECATED RDW RBC AUTO: 44.6 FL (ref 35–45)
EOSINOPHIL NFR BLD AUTO: 1.7 %
EOSINOPHILS ABSOLUTE: 0.1 THOU/MM3 (ref 0–0.4)
ERYTHROCYTE [DISTWIDTH] IN BLOOD BY AUTOMATED COUNT: 13.2 % (ref 11.5–14.5)
GFR SERPL CREATININE-BSD FRML MDRD: 63 ML/MIN/1.73M2
GLUCOSE SERPL-MCNC: 102 MG/DL (ref 74–109)
HCT VFR BLD AUTO: 38.7 % (ref 42–52)
HGB BLD-MCNC: 12.3 GM/DL (ref 14–18)
IMM GRANULOCYTES # BLD AUTO: 0.01 THOU/MM3 (ref 0–0.07)
IMM GRANULOCYTES NFR BLD AUTO: 0.2 %
LYMPHOCYTES ABSOLUTE: 1.8 THOU/MM3 (ref 1–4.8)
LYMPHOCYTES NFR BLD AUTO: 30.2 %
MCH RBC QN AUTO: 29.4 PG (ref 26–33)
MCHC RBC AUTO-ENTMCNC: 31.8 GM/DL (ref 32.2–35.5)
MCV RBC AUTO: 92.4 FL (ref 80–94)
MONOCYTES ABSOLUTE: 0.5 THOU/MM3 (ref 0.4–1.3)
MONOCYTES NFR BLD AUTO: 8.2 %
NEUTROPHILS ABSOLUTE: 3.4 THOU/MM3 (ref 1.8–7.7)
NEUTROPHILS NFR BLD AUTO: 59 %
NRBC BLD AUTO-RTO: 0 /100 WBC
PLATELET # BLD AUTO: 165 THOU/MM3 (ref 130–400)
PMV BLD AUTO: 11.3 FL (ref 9.4–12.4)
POTASSIUM SERPL-SCNC: 4.5 MEQ/L (ref 3.5–5.2)
PROT SERPL-MCNC: 6.4 G/DL (ref 6.4–8.3)
RBC # BLD AUTO: 4.19 MILL/MM3 (ref 4.7–6.1)
SODIUM SERPL-SCNC: 143 MEQ/L (ref 135–145)
WBC # BLD AUTO: 5.8 THOU/MM3 (ref 4.8–10.8)

## 2025-05-27 PROCEDURE — 80053 COMPREHEN METABOLIC PANEL: CPT

## 2025-05-27 PROCEDURE — 85025 COMPLETE CBC W/AUTO DIFF WBC: CPT

## 2025-05-27 PROCEDURE — 36415 COLL VENOUS BLD VENIPUNCTURE: CPT

## 2025-05-29 ENCOUNTER — PREP FOR PROCEDURE (OUTPATIENT)
Dept: ENT CLINIC | Age: 75
End: 2025-05-29

## 2025-05-29 NOTE — PROGRESS NOTES
PAT call attempted, patient unavailable, left message to please call us back at your earliest convenience; [phone]    Late entry  6807 pt called back very hear of hearing.  We was having difficulty with communicating over phone. Asked pt is ok if he could come in for appt.

## 2025-05-30 ENCOUNTER — HOSPITAL ENCOUNTER (OUTPATIENT)
Dept: PREADMISSION TESTING | Age: 75
Discharge: HOME | End: 2025-06-03

## 2025-05-30 ENCOUNTER — TELEPHONE (OUTPATIENT)
Dept: ENT CLINIC | Age: 75
End: 2025-05-30

## 2025-05-30 VITALS
SYSTOLIC BLOOD PRESSURE: 131 MMHG | OXYGEN SATURATION: 96 % | BODY MASS INDEX: 24.14 KG/M2 | HEIGHT: 69 IN | WEIGHT: 163 LBS | TEMPERATURE: 97.2 F | DIASTOLIC BLOOD PRESSURE: 58 MMHG | RESPIRATION RATE: 16 BRPM | HEART RATE: 75 BPM

## 2025-05-30 RX ORDER — BENZONATATE 200 MG/1
200 CAPSULE ORAL 3 TIMES DAILY PRN
Status: ON HOLD | COMMUNITY

## 2025-05-30 RX ORDER — FAMOTIDINE 40 MG/1
40 TABLET, FILM COATED ORAL DAILY
Status: ON HOLD | COMMUNITY

## 2025-05-30 RX ORDER — POLYETHYLENE GLYCOL 3350, SODIUM SULFATE ANHYDROUS, SODIUM BICARBONATE, SODIUM CHLORIDE, POTASSIUM CHLORIDE 236; 22.74; 6.74; 5.86; 2.97 G/4L; G/4L; G/4L; G/4L; G/4L
POWDER, FOR SOLUTION ORAL
Status: ON HOLD | COMMUNITY
Start: 2025-04-15 | End: 2025-06-05

## 2025-05-30 ASSESSMENT — PAIN - FUNCTIONAL ASSESSMENT: PAIN_FUNCTIONAL_ASSESSMENT: PREVENTS OR INTERFERES SOME ACTIVE ACTIVITIES AND ADLS

## 2025-05-30 ASSESSMENT — PAIN SCALES - GENERAL: PAINLEVEL_OUTOF10: 4

## 2025-05-30 ASSESSMENT — PAIN DESCRIPTION - LOCATION: LOCATION: LEG

## 2025-05-30 ASSESSMENT — PAIN DESCRIPTION - ORIENTATION: ORIENTATION: RIGHT;LEFT

## 2025-05-30 NOTE — PROGRESS NOTES
Nothing to Eat or Drink after midnight except you may have sips of water with medications instructed to take am of surgery.  This includes no mints, gum and ice chips.   Bring insurance info and 's license  Wear comfortable clean clothing  Do not wear jewelry,piercings,nail polish or contact lenses  Shower as instructed prior to surgery  Bring medications in original bottles  Bring CPAP machine if you have one  Follow all instructions given by your physician   needed at discharge  Routine preop instructions given for showering with no lotions,creams or powders.    Do not shave the surgical area! If needed, your nurse will use clippers to remove any excess hair at the surgical site when you come in for surgery. Do not use a razor on the site of your surgery for at least 2 days prior to surgery because shaving can leave tiny nicks in the skin which may allow germs to enter and cause infection.    Information regarding hand hygiene, MRSA, general anesthesia, fall precautions, coughing and deep breathing, infection prevention and signs & symptoms of infection and blood transfusions reviewed and handouts given to patient. Questions answered.    Call [facility] [phone] for any questions Mon-Fri 7:30-4 PM  Report to [facility] on 2nd floor- written directions given  If you would become ill prior to surgery, please call the surgeon right away  Masks not required at this time, still recommended and we do have them at front information desk if you would like to have one.  We like to keep visitors in surgical waiting area to 2 people if possible.Nothing to Eat or Drink after midnight except you may have sips of water with medications instructed to take am of surgery.  This includes no mints, gum and ice chips.   Bring insurance info and 's license  Wear comfortable clean clothing  Do not wear jewelry,piercings,nail polish or contact lenses  Shower as instructed prior to surgery  Bring medications in original

## 2025-05-30 NOTE — DISCHARGE INSTRUCTIONS
Please use 0.9% normal saline every 4 hours while awake, 3% Saline nebulizer as needed for thicker mucus, and neblized albuterol every 4 hours as needed. You should walk around frequently. You should take Bactrim twice a day for the next 10 days. You can restart Aspirin and Prasugrel tomorrow.     You should use the mupirocin ointment three times a day for the next 14 days on your arm and follow up with your primary care provider in that time.    Please attend you scheduled follow up with Dr. Meyers 6/18 at 8:30AM.    If you experience an increase in shortness of breath, chest pain, dizziness, coughing up blood, unable to cough up mucus, fevers, chills, and difficulty swallowing you should return to the ER for evaluation.

## 2025-05-30 NOTE — TELEPHONE ENCOUNTER
Cardiac clearance requested by Dr Sanders on 5/13/25. Refaxed the clearance form again on 5/29/25. Called 5/30/25 and spoke to Kunal. She said the nurse has it pulled for review and will relay the need for this asap.

## 2025-06-02 NOTE — TELEPHONE ENCOUNTER
Vladimir Muñiz called requesting a refill on the following medications:  Requested Prescriptions     Pending Prescriptions Disp Refills    tamsulosin (FLOMAX) 0.4 MG capsule [Pharmacy Med Name: Tamsulosin HCl 0.4 MG Oral Capsule] 180 capsule 0     Sig: Take 1 capsule by mouth twice daily     Pharmacy verified:  [email or web address]      Date of last visit: 03/21/2025  Date of next visit (if applicable): Voicemail left to make a follow up appointment

## 2025-06-03 ENCOUNTER — RESULTS FOLLOW-UP (OUTPATIENT)
Dept: ENT CLINIC | Age: 75
End: 2025-06-03

## 2025-06-03 ENCOUNTER — TELEPHONE (OUTPATIENT)
Dept: ENT CLINIC | Age: 75
End: 2025-06-03

## 2025-06-03 NOTE — RESULT ENCOUNTER NOTE
Please contact the patient and let him know that he was likely dehydrated when he had his bloods drawn for his upcoming operation.  He needs to increase his fluid intake to day and consistently for the next several days so that he does not come in dehydrated.  Thank you  PFC

## 2025-06-03 NOTE — TELEPHONE ENCOUNTER
Vladimir calls today stating he saw Dr Sanders on Monday 6/2/25 and he cleared him for surgery but he wanted us to know that he went to [facility] on Monday night for a rash on his arm (he said he has a couple of family members with the same thing and it may be MRSA but also got a shot recently for poison ivy).  The ER notes from that visit are scanned into the media tab. He said [facility] called in an antibiotic for 10 days which he will get today.     He wasn't sure if this will affect his surgery planned for Thursday with Dr Meyers and Dr Mcnulty for a Bronchoscopy and Tracheobronchoplasty.       Please advise.

## 2025-06-04 RX ORDER — IPRATROPIUM BROMIDE AND ALBUTEROL SULFATE 2.5; .5 MG/3ML; MG/3ML
1 SOLUTION RESPIRATORY (INHALATION) EVERY 4 HOURS PRN
Status: CANCELLED | OUTPATIENT
Start: 2025-06-05

## 2025-06-04 RX ORDER — SODIUM CHLORIDE 9 MG/ML
INJECTION, SOLUTION INTRAVENOUS PRN
Status: CANCELLED | OUTPATIENT
Start: 2025-06-04

## 2025-06-04 RX ORDER — SODIUM CHLORIDE 0.9 % (FLUSH) 0.9 %
5-40 SYRINGE (ML) INJECTION PRN
Status: CANCELLED | OUTPATIENT
Start: 2025-06-04

## 2025-06-04 RX ORDER — TAMSULOSIN HYDROCHLORIDE 0.4 MG/1
0.4 CAPSULE ORAL 2 TIMES DAILY
Qty: 180 CAPSULE | Refills: 0 | Status: ON HOLD | OUTPATIENT
Start: 2025-06-04

## 2025-06-04 RX ORDER — SODIUM CHLORIDE 0.9 % (FLUSH) 0.9 %
5-40 SYRINGE (ML) INJECTION EVERY 12 HOURS SCHEDULED
Status: CANCELLED | OUTPATIENT
Start: 2025-06-04

## 2025-06-05 ENCOUNTER — ANESTHESIA EVENT (OUTPATIENT)
Dept: OPERATING ROOM | Age: 75
End: 2025-06-05
Payer: MEDICARE

## 2025-06-05 ENCOUNTER — ANESTHESIA (OUTPATIENT)
Dept: OPERATING ROOM | Age: 75
End: 2025-06-05
Payer: MEDICARE

## 2025-06-05 ENCOUNTER — HOSPITAL ENCOUNTER (INPATIENT)
Age: 75
LOS: 4 days | Discharge: HOME | DRG: 163 | End: 2025-06-09
Attending: OTOLARYNGOLOGY | Admitting: OTOLARYNGOLOGY
Payer: MEDICARE

## 2025-06-05 ENCOUNTER — APPOINTMENT (OUTPATIENT)
Dept: GENERAL RADIOLOGY | Age: 75
DRG: 163 | End: 2025-06-05
Attending: OTOLARYNGOLOGY
Payer: MEDICARE

## 2025-06-05 PROBLEM — R06.89 AIRWAY CLEARANCE IMPAIRMENT: Status: ACTIVE | Noted: 2025-06-05

## 2025-06-05 LAB
ANION GAP SERPL CALC-SCNC: 11 MEQ/L (ref 8–16)
BASOPHILS ABSOLUTE: 0 THOU/MM3 (ref 0–0.1)
BASOPHILS NFR BLD AUTO: 0.4 %
BUN SERPL-MCNC: 18 MG/DL (ref 8–23)
CALCIUM SERPL-MCNC: 8.6 MG/DL (ref 8.8–10.2)
CHLORIDE SERPL-SCNC: 109 MEQ/L (ref 98–111)
CO2 SERPL-SCNC: 20 MEQ/L (ref 22–29)
CREAT SERPL-MCNC: 0.9 MG/DL (ref 0.7–1.2)
DEPRECATED RDW RBC AUTO: 46 FL (ref 35–45)
EOSINOPHIL NFR BLD AUTO: 0.4 %
EOSINOPHILS ABSOLUTE: 0 THOU/MM3 (ref 0–0.4)
ERYTHROCYTE [DISTWIDTH] IN BLOOD BY AUTOMATED COUNT: 13 % (ref 11.5–14.5)
GFR SERPL CREATININE-BSD FRML MDRD: 88 ML/MIN/1.73M2
GLUCOSE SERPL-MCNC: 117 MG/DL (ref 74–109)
HCT VFR BLD AUTO: 37.7 % (ref 42–52)
HGB BLD-MCNC: 11.7 GM/DL (ref 14–18)
IMM GRANULOCYTES # BLD AUTO: 0.02 THOU/MM3 (ref 0–0.07)
IMM GRANULOCYTES NFR BLD AUTO: 0.4 %
LYMPHOCYTES ABSOLUTE: 0.7 THOU/MM3 (ref 1–4.8)
LYMPHOCYTES NFR BLD AUTO: 14.5 %
MCH RBC QN AUTO: 29.5 PG (ref 26–33)
MCHC RBC AUTO-ENTMCNC: 31 GM/DL (ref 32.2–35.5)
MCV RBC AUTO: 95 FL (ref 80–94)
MONOCYTES ABSOLUTE: 0.1 THOU/MM3 (ref 0.4–1.3)
MONOCYTES NFR BLD AUTO: 1.7 %
NEUTROPHILS ABSOLUTE: 3.9 THOU/MM3 (ref 1.8–7.7)
NEUTROPHILS NFR BLD AUTO: 82.6 %
NRBC BLD AUTO-RTO: 0 /100 WBC
PLATELET # BLD AUTO: 166 THOU/MM3 (ref 130–400)
PMV BLD AUTO: 11.3 FL (ref 9.4–12.4)
POTASSIUM SERPL-SCNC: 4.4 MEQ/L (ref 3.5–5.2)
RBC # BLD AUTO: 3.97 MILL/MM3 (ref 4.7–6.1)
SODIUM SERPL-SCNC: 140 MEQ/L (ref 135–145)
WBC # BLD AUTO: 4.7 THOU/MM3 (ref 4.8–10.8)

## 2025-06-05 PROCEDURE — 3600000004 HC SURGERY LEVEL 4 BASE: Performed by: OTOLARYNGOLOGY

## 2025-06-05 PROCEDURE — 2580000003 HC RX 258: Performed by: OTOLARYNGOLOGY

## 2025-06-05 PROCEDURE — 6370000000 HC RX 637 (ALT 250 FOR IP): Performed by: OTOLARYNGOLOGY

## 2025-06-05 PROCEDURE — C1601 HC ENDOSCOPE, PULM, IMAGING/ILLUMINATION DEVICE: HCPCS | Performed by: OTOLARYNGOLOGY

## 2025-06-05 PROCEDURE — 31750 TRACHEOPLASTY CERVICAL: CPT | Performed by: OTOLARYNGOLOGY

## 2025-06-05 PROCEDURE — 36415 COLL VENOUS BLD VENIPUNCTURE: CPT

## 2025-06-05 PROCEDURE — 99222 1ST HOSP IP/OBS MODERATE 55: CPT | Performed by: STUDENT IN AN ORGANIZED HEALTH CARE EDUCATION/TRAINING PROGRAM

## 2025-06-05 PROCEDURE — 2060000000 HC ICU INTERMEDIATE R&B

## 2025-06-05 PROCEDURE — 2709999900 HC NON-CHARGEABLE SUPPLY: Performed by: OTOLARYNGOLOGY

## 2025-06-05 PROCEDURE — 3700000000 HC ANESTHESIA ATTENDED CARE: Performed by: OTOLARYNGOLOGY

## 2025-06-05 PROCEDURE — 5A0945A ASSISTANCE WITH RESPIRATORY VENTILATION, 24-96 CONSECUTIVE HOURS, HIGH NASAL FLOW/VELOCITY: ICD-10-PCS | Performed by: OTOLARYNGOLOGY

## 2025-06-05 PROCEDURE — 6360000002 HC RX W HCPCS: Performed by: OTOLARYNGOLOGY

## 2025-06-05 PROCEDURE — 31624 DX BRONCHOSCOPE/LAVAGE: CPT | Performed by: OTOLARYNGOLOGY

## 2025-06-05 PROCEDURE — 0BQ18ZZ REPAIR TRACHEA, VIA NATURAL OR ARTIFICIAL OPENING ENDOSCOPIC: ICD-10-PCS | Performed by: OTOLARYNGOLOGY

## 2025-06-05 PROCEDURE — 6360000002 HC RX W HCPCS: Performed by: NURSE ANESTHETIST, CERTIFIED REGISTERED

## 2025-06-05 PROCEDURE — 3700000001 HC ADD 15 MINUTES (ANESTHESIA): Performed by: OTOLARYNGOLOGY

## 2025-06-05 PROCEDURE — 7100000000 HC PACU RECOVERY - FIRST 15 MIN: Performed by: OTOLARYNGOLOGY

## 2025-06-05 PROCEDURE — 7100000001 HC PACU RECOVERY - ADDTL 15 MIN: Performed by: OTOLARYNGOLOGY

## 2025-06-05 PROCEDURE — 2720000010 HC SURG SUPPLY STERILE: Performed by: OTOLARYNGOLOGY

## 2025-06-05 PROCEDURE — 0BQ58ZZ REPAIR RIGHT MIDDLE LOBE BRONCHUS, VIA NATURAL OR ARTIFICIAL OPENING ENDOSCOPIC: ICD-10-PCS | Performed by: OTOLARYNGOLOGY

## 2025-06-05 PROCEDURE — 94640 AIRWAY INHALATION TREATMENT: CPT

## 2025-06-05 PROCEDURE — 71045 X-RAY EXAM CHEST 1 VIEW: CPT

## 2025-06-05 PROCEDURE — 80048 BASIC METABOLIC PNL TOTAL CA: CPT

## 2025-06-05 PROCEDURE — 85025 COMPLETE CBC W/AUTO DIFF WBC: CPT

## 2025-06-05 PROCEDURE — 2700000000 HC OXYGEN THERAPY PER DAY

## 2025-06-05 PROCEDURE — 3600000014 HC SURGERY LEVEL 4 ADDTL 15MIN: Performed by: OTOLARYNGOLOGY

## 2025-06-05 PROCEDURE — 94761 N-INVAS EAR/PLS OXIMETRY MLT: CPT

## 2025-06-05 PROCEDURE — 0BQ48ZZ REPAIR RIGHT UPPER LOBE BRONCHUS, VIA NATURAL OR ARTIFICIAL OPENING ENDOSCOPIC: ICD-10-PCS | Performed by: OTOLARYNGOLOGY

## 2025-06-05 PROCEDURE — 2500000003 HC RX 250 WO HCPCS: Performed by: NURSE ANESTHETIST, CERTIFIED REGISTERED

## 2025-06-05 RX ORDER — ISOSORBIDE MONONITRATE 60 MG/1
60 TABLET, EXTENDED RELEASE ORAL EVERY MORNING
Status: DISCONTINUED | OUTPATIENT
Start: 2025-06-06 | End: 2025-06-09 | Stop reason: HOSPADM

## 2025-06-05 RX ORDER — PHENYLEPHRINE HCL IN 0.9% NACL 1 MG/10 ML
SYRINGE (ML) INTRAVENOUS
Status: DISCONTINUED | OUTPATIENT
Start: 2025-06-05 | End: 2025-06-05 | Stop reason: SDUPTHER

## 2025-06-05 RX ORDER — FAMOTIDINE 20 MG/1
40 TABLET, FILM COATED ORAL DAILY
Status: DISCONTINUED | OUTPATIENT
Start: 2025-06-06 | End: 2025-06-09 | Stop reason: HOSPADM

## 2025-06-05 RX ORDER — LIDOCAINE HYDROCHLORIDE 20 MG/ML
INJECTION, SOLUTION INTRAVENOUS
Status: DISCONTINUED | OUTPATIENT
Start: 2025-06-05 | End: 2025-06-05 | Stop reason: SDUPTHER

## 2025-06-05 RX ORDER — MUPIROCIN 2 %
OINTMENT (GRAM) TOPICAL 2 TIMES DAILY
Status: DISCONTINUED | OUTPATIENT
Start: 2025-06-05 | End: 2025-06-08

## 2025-06-05 RX ORDER — ONDANSETRON 2 MG/ML
INJECTION INTRAMUSCULAR; INTRAVENOUS
Status: DISCONTINUED | OUTPATIENT
Start: 2025-06-05 | End: 2025-06-05 | Stop reason: SDUPTHER

## 2025-06-05 RX ORDER — SODIUM CHLORIDE 0.9 % (FLUSH) 0.9 %
5-40 SYRINGE (ML) INJECTION EVERY 12 HOURS SCHEDULED
Status: DISCONTINUED | OUTPATIENT
Start: 2025-06-05 | End: 2025-06-05 | Stop reason: HOSPADM

## 2025-06-05 RX ORDER — FENTANYL CITRATE 50 UG/ML
25 INJECTION, SOLUTION INTRAMUSCULAR; INTRAVENOUS EVERY 5 MIN PRN
Status: DISCONTINUED | OUTPATIENT
Start: 2025-06-05 | End: 2025-06-05 | Stop reason: HOSPADM

## 2025-06-05 RX ORDER — GABAPENTIN 400 MG/1
400 CAPSULE ORAL 3 TIMES DAILY
Status: DISCONTINUED | OUTPATIENT
Start: 2025-06-05 | End: 2025-06-09 | Stop reason: HOSPADM

## 2025-06-05 RX ORDER — FENTANYL CITRATE 50 UG/ML
50 INJECTION, SOLUTION INTRAMUSCULAR; INTRAVENOUS EVERY 5 MIN PRN
Status: DISCONTINUED | OUTPATIENT
Start: 2025-06-05 | End: 2025-06-05 | Stop reason: HOSPADM

## 2025-06-05 RX ORDER — NITROGLYCERIN 0.4 MG/1
0.4 TABLET SUBLINGUAL EVERY 5 MIN PRN
Status: DISCONTINUED | OUTPATIENT
Start: 2025-06-05 | End: 2025-06-09 | Stop reason: HOSPADM

## 2025-06-05 RX ORDER — ONDANSETRON 4 MG/1
4 TABLET, ORALLY DISINTEGRATING ORAL EVERY 8 HOURS PRN
Status: DISCONTINUED | OUTPATIENT
Start: 2025-06-05 | End: 2025-06-09 | Stop reason: HOSPADM

## 2025-06-05 RX ORDER — AMLODIPINE BESYLATE 5 MG/1
5 TABLET ORAL DAILY
Status: DISCONTINUED | OUTPATIENT
Start: 2025-06-06 | End: 2025-06-09 | Stop reason: HOSPADM

## 2025-06-05 RX ORDER — SODIUM CHLORIDE 0.9 % (FLUSH) 0.9 %
5-40 SYRINGE (ML) INJECTION PRN
Status: DISCONTINUED | OUTPATIENT
Start: 2025-06-05 | End: 2025-06-05 | Stop reason: HOSPADM

## 2025-06-05 RX ORDER — IBUPROFEN 400 MG/1
400 TABLET, FILM COATED ORAL EVERY 12 HOURS PRN
Status: DISCONTINUED | OUTPATIENT
Start: 2025-06-05 | End: 2025-06-09 | Stop reason: HOSPADM

## 2025-06-05 RX ORDER — ONDANSETRON 2 MG/ML
4 INJECTION INTRAMUSCULAR; INTRAVENOUS EVERY 6 HOURS PRN
Status: DISCONTINUED | OUTPATIENT
Start: 2025-06-05 | End: 2025-06-09 | Stop reason: HOSPADM

## 2025-06-05 RX ORDER — PANTOPRAZOLE SODIUM 40 MG/1
40 TABLET, DELAYED RELEASE ORAL
Status: DISCONTINUED | OUTPATIENT
Start: 2025-06-05 | End: 2025-06-09 | Stop reason: HOSPADM

## 2025-06-05 RX ORDER — SULFASALAZINE 500 MG/1
500 TABLET ORAL DAILY
Status: DISCONTINUED | OUTPATIENT
Start: 2025-06-06 | End: 2025-06-09 | Stop reason: HOSPADM

## 2025-06-05 RX ORDER — AMITRIPTYLINE HYDROCHLORIDE 50 MG/1
50 TABLET ORAL NIGHTLY
Status: DISCONTINUED | OUTPATIENT
Start: 2025-06-05 | End: 2025-06-09 | Stop reason: HOSPADM

## 2025-06-05 RX ORDER — PRAVASTATIN SODIUM 40 MG
40 TABLET ORAL NIGHTLY
Status: DISCONTINUED | OUTPATIENT
Start: 2025-06-05 | End: 2025-06-09 | Stop reason: HOSPADM

## 2025-06-05 RX ORDER — TAMSULOSIN HYDROCHLORIDE 0.4 MG/1
0.4 CAPSULE ORAL 2 TIMES DAILY
Status: DISCONTINUED | OUTPATIENT
Start: 2025-06-05 | End: 2025-06-09 | Stop reason: HOSPADM

## 2025-06-05 RX ORDER — SODIUM CHLORIDE 9 MG/ML
INJECTION, SOLUTION INTRAVENOUS PRN
Status: DISCONTINUED | OUTPATIENT
Start: 2025-06-05 | End: 2025-06-05 | Stop reason: HOSPADM

## 2025-06-05 RX ORDER — PRASUGREL 10 MG/1
5 TABLET, FILM COATED ORAL DAILY
Status: DISCONTINUED | OUTPATIENT
Start: 2025-06-05 | End: 2025-06-09 | Stop reason: HOSPADM

## 2025-06-05 RX ORDER — METOPROLOL SUCCINATE 25 MG/1
25 TABLET, EXTENDED RELEASE ORAL DAILY
Status: DISCONTINUED | OUTPATIENT
Start: 2025-06-06 | End: 2025-06-09 | Stop reason: HOSPADM

## 2025-06-05 RX ORDER — CEPHALEXIN 500 MG/1
500 CAPSULE ORAL 3 TIMES DAILY
Status: ON HOLD | COMMUNITY
End: 2025-06-05

## 2025-06-05 RX ORDER — SODIUM CHLORIDE 0.9 % (FLUSH) 0.9 %
5-40 SYRINGE (ML) INJECTION EVERY 12 HOURS SCHEDULED
Status: DISCONTINUED | OUTPATIENT
Start: 2025-06-05 | End: 2025-06-09 | Stop reason: HOSPADM

## 2025-06-05 RX ORDER — SODIUM CHLORIDE FOR INHALATION 3 %
4 VIAL, NEBULIZER (ML) INHALATION PRN
Status: DISCONTINUED | OUTPATIENT
Start: 2025-06-05 | End: 2025-06-09 | Stop reason: HOSPADM

## 2025-06-05 RX ORDER — ASPIRIN 81 MG/1
81 TABLET ORAL DAILY
Status: DISCONTINUED | OUTPATIENT
Start: 2025-06-06 | End: 2025-06-09 | Stop reason: HOSPADM

## 2025-06-05 RX ORDER — NALOXONE HYDROCHLORIDE 0.4 MG/ML
INJECTION, SOLUTION INTRAMUSCULAR; INTRAVENOUS; SUBCUTANEOUS PRN
Status: DISCONTINUED | OUTPATIENT
Start: 2025-06-05 | End: 2025-06-05 | Stop reason: HOSPADM

## 2025-06-05 RX ORDER — MUPIROCIN 2 %
OINTMENT (GRAM) TOPICAL 2 TIMES DAILY
Status: DISCONTINUED | OUTPATIENT
Start: 2025-06-05 | End: 2025-06-05 | Stop reason: SDUPTHER

## 2025-06-05 RX ORDER — IPRATROPIUM BROMIDE AND ALBUTEROL SULFATE 2.5; .5 MG/3ML; MG/3ML
1 SOLUTION RESPIRATORY (INHALATION) EVERY 4 HOURS PRN
Status: DISCONTINUED | OUTPATIENT
Start: 2025-06-05 | End: 2025-06-05 | Stop reason: HOSPADM

## 2025-06-05 RX ORDER — ROCURONIUM BROMIDE 10 MG/ML
INJECTION, SOLUTION INTRAVENOUS
Status: DISCONTINUED | OUTPATIENT
Start: 2025-06-05 | End: 2025-06-05 | Stop reason: SDUPTHER

## 2025-06-05 RX ORDER — PROPOFOL 10 MG/ML
INJECTION, EMULSION INTRAVENOUS
Status: DISCONTINUED | OUTPATIENT
Start: 2025-06-05 | End: 2025-06-05 | Stop reason: SDUPTHER

## 2025-06-05 RX ORDER — SODIUM CHLORIDE 9 MG/ML
INJECTION, SOLUTION INTRAVENOUS PRN
Status: DISCONTINUED | OUTPATIENT
Start: 2025-06-05 | End: 2025-06-09 | Stop reason: HOSPADM

## 2025-06-05 RX ORDER — LISINOPRIL 40 MG/1
40 TABLET ORAL DAILY
Status: DISCONTINUED | OUTPATIENT
Start: 2025-06-06 | End: 2025-06-09 | Stop reason: HOSPADM

## 2025-06-05 RX ORDER — FENTANYL CITRATE 50 UG/ML
INJECTION, SOLUTION INTRAMUSCULAR; INTRAVENOUS
Status: DISCONTINUED | OUTPATIENT
Start: 2025-06-05 | End: 2025-06-05 | Stop reason: SDUPTHER

## 2025-06-05 RX ORDER — OXYBUTYNIN CHLORIDE 5 MG/1
5 TABLET, EXTENDED RELEASE ORAL DAILY
Status: DISCONTINUED | OUTPATIENT
Start: 2025-06-05 | End: 2025-06-09 | Stop reason: HOSPADM

## 2025-06-05 RX ORDER — ALBUTEROL SULFATE 0.83 MG/ML
2.5 SOLUTION RESPIRATORY (INHALATION) 2 TIMES DAILY
Status: DISCONTINUED | OUTPATIENT
Start: 2025-06-05 | End: 2025-06-06

## 2025-06-05 RX ORDER — DEXAMETHASONE SODIUM PHOSPHATE 10 MG/ML
10 INJECTION, SOLUTION INTRAMUSCULAR; INTRAVENOUS ONCE
Status: COMPLETED | OUTPATIENT
Start: 2025-06-05 | End: 2025-06-05

## 2025-06-05 RX ORDER — HYDROXYZINE HYDROCHLORIDE 10 MG/1
10 TABLET, FILM COATED ORAL NIGHTLY
Status: DISCONTINUED | OUTPATIENT
Start: 2025-06-05 | End: 2025-06-09 | Stop reason: HOSPADM

## 2025-06-05 RX ORDER — TRAMADOL HYDROCHLORIDE 50 MG/1
50 TABLET ORAL EVERY 6 HOURS PRN
Status: DISCONTINUED | OUTPATIENT
Start: 2025-06-05 | End: 2025-06-09 | Stop reason: HOSPADM

## 2025-06-05 RX ORDER — LABETALOL HYDROCHLORIDE 5 MG/ML
INJECTION, SOLUTION INTRAVENOUS
Status: DISCONTINUED | OUTPATIENT
Start: 2025-06-05 | End: 2025-06-05 | Stop reason: SDUPTHER

## 2025-06-05 RX ORDER — TRAMADOL HYDROCHLORIDE 50 MG/1
100 TABLET ORAL EVERY 6 HOURS PRN
Status: DISCONTINUED | OUTPATIENT
Start: 2025-06-05 | End: 2025-06-09 | Stop reason: HOSPADM

## 2025-06-05 RX ORDER — POLYETHYLENE GLYCOL 3350 17 G/17G
17 POWDER, FOR SOLUTION ORAL DAILY
Status: DISCONTINUED | OUTPATIENT
Start: 2025-06-05 | End: 2025-06-09 | Stop reason: HOSPADM

## 2025-06-05 RX ORDER — EZETIMIBE 10 MG/1
10 TABLET ORAL DAILY
Status: DISCONTINUED | OUTPATIENT
Start: 2025-06-06 | End: 2025-06-09 | Stop reason: HOSPADM

## 2025-06-05 RX ORDER — ASPIRIN 81 MG
1 TABLET, DELAYED RELEASE (ENTERIC COATED) ORAL DAILY
Status: DISCONTINUED | OUTPATIENT
Start: 2025-06-05 | End: 2025-06-05 | Stop reason: RX

## 2025-06-05 RX ORDER — TERBINAFINE HYDROCHLORIDE 250 MG/1
250 TABLET ORAL DAILY
Status: DISCONTINUED | OUTPATIENT
Start: 2025-06-06 | End: 2025-06-09 | Stop reason: HOSPADM

## 2025-06-05 RX ORDER — SODIUM CHLORIDE 0.9 % (FLUSH) 0.9 %
5-40 SYRINGE (ML) INJECTION PRN
Status: DISCONTINUED | OUTPATIENT
Start: 2025-06-05 | End: 2025-06-09 | Stop reason: HOSPADM

## 2025-06-05 RX ORDER — BENZONATATE 100 MG/1
200 CAPSULE ORAL 3 TIMES DAILY PRN
Status: DISCONTINUED | OUTPATIENT
Start: 2025-06-05 | End: 2025-06-09 | Stop reason: HOSPADM

## 2025-06-05 RX ORDER — SODIUM CHLORIDE FOR INHALATION 0.9 %
3 VIAL, NEBULIZER (ML) INHALATION EVERY 4 HOURS
Status: DISCONTINUED | OUTPATIENT
Start: 2025-06-05 | End: 2025-06-09 | Stop reason: HOSPADM

## 2025-06-05 RX ORDER — SODIUM CHLORIDE, SODIUM LACTATE, POTASSIUM CHLORIDE, CALCIUM CHLORIDE 600; 310; 30; 20 MG/100ML; MG/100ML; MG/100ML; MG/100ML
INJECTION, SOLUTION INTRAVENOUS CONTINUOUS
Status: DISCONTINUED | OUTPATIENT
Start: 2025-06-05 | End: 2025-06-09

## 2025-06-05 RX ADMIN — AMPICILLIN SODIUM AND SULBACTAM SODIUM 3000 MG: 2; 1 INJECTION, POWDER, FOR SOLUTION INTRAMUSCULAR; INTRAVENOUS at 18:39

## 2025-06-05 RX ADMIN — ROCURONIUM BROMIDE 25 MG: 10 INJECTION, SOLUTION INTRAVENOUS at 14:13

## 2025-06-05 RX ADMIN — AMITRIPTYLINE HYDROCHLORIDE 50 MG: 50 TABLET ORAL at 20:00

## 2025-06-05 RX ADMIN — ONDANSETRON 4 MG: 2 INJECTION, SOLUTION INTRAMUSCULAR; INTRAVENOUS at 14:30

## 2025-06-05 RX ADMIN — ISODIUM CHLORIDE 3 ML: 0.03 SOLUTION RESPIRATORY (INHALATION) at 17:04

## 2025-06-05 RX ADMIN — LABETALOL HYDROCHLORIDE 5 MG: 5 INJECTION, SOLUTION INTRAVENOUS at 13:47

## 2025-06-05 RX ADMIN — Medication 3 MG: at 20:00

## 2025-06-05 RX ADMIN — PROPOFOL 150 MCG/KG/MIN: 10 INJECTION, EMULSION INTRAVENOUS at 13:33

## 2025-06-05 RX ADMIN — SODIUM CHLORIDE 3000 MG: 9 INJECTION, SOLUTION INTRAVENOUS at 13:07

## 2025-06-05 RX ADMIN — PROPOFOL 150 MG: 10 INJECTION, EMULSION INTRAVENOUS at 13:04

## 2025-06-05 RX ADMIN — ROCURONIUM BROMIDE 25 MG: 10 INJECTION, SOLUTION INTRAVENOUS at 13:34

## 2025-06-05 RX ADMIN — TAMSULOSIN HYDROCHLORIDE 0.4 MG: 0.4 CAPSULE ORAL at 20:00

## 2025-06-05 RX ADMIN — FENTANYL CITRATE 50 MCG: 50 INJECTION, SOLUTION INTRAMUSCULAR; INTRAVENOUS at 13:41

## 2025-06-05 RX ADMIN — PRAVASTATIN SODIUM 40 MG: 40 TABLET ORAL at 20:00

## 2025-06-05 RX ADMIN — SODIUM CHLORIDE: 0.9 INJECTION, SOLUTION INTRAVENOUS at 11:32

## 2025-06-05 RX ADMIN — GABAPENTIN 400 MG: 400 CAPSULE ORAL at 20:00

## 2025-06-05 RX ADMIN — ISODIUM CHLORIDE 3 ML: 0.03 SOLUTION RESPIRATORY (INHALATION) at 21:03

## 2025-06-05 RX ADMIN — HYDROXYZINE HYDROCHLORIDE 10 MG: 10 TABLET ORAL at 20:00

## 2025-06-05 RX ADMIN — MUPIROCIN: 20 OINTMENT TOPICAL at 20:00

## 2025-06-05 RX ADMIN — FENTANYL CITRATE 100 MCG: 50 INJECTION, SOLUTION INTRAMUSCULAR; INTRAVENOUS at 13:02

## 2025-06-05 RX ADMIN — Medication 200 MCG: at 13:28

## 2025-06-05 RX ADMIN — MUPIROCIN: 20 OINTMENT TOPICAL at 11:35

## 2025-06-05 RX ADMIN — SODIUM CHLORIDE, SODIUM LACTATE, POTASSIUM CHLORIDE, AND CALCIUM CHLORIDE: .6; .31; .03; .02 INJECTION, SOLUTION INTRAVENOUS at 16:04

## 2025-06-05 RX ADMIN — Medication 100 MG: at 13:04

## 2025-06-05 RX ADMIN — ALBUTEROL SULFATE 2.5 MG: 2.5 SOLUTION RESPIRATORY (INHALATION) at 21:03

## 2025-06-05 RX ADMIN — SUGAMMADEX 400 MG: 100 INJECTION, SOLUTION INTRAVENOUS at 14:26

## 2025-06-05 RX ADMIN — ROCURONIUM BROMIDE 50 MG: 10 INJECTION, SOLUTION INTRAVENOUS at 13:04

## 2025-06-05 RX ADMIN — ONDANSETRON 4 MG: 2 INJECTION, SOLUTION INTRAMUSCULAR; INTRAVENOUS at 21:29

## 2025-06-05 RX ADMIN — DEXAMETHASONE SODIUM PHOSPHATE 10 MG: 10 INJECTION, SOLUTION INTRAMUSCULAR; INTRAVENOUS at 11:31

## 2025-06-05 ASSESSMENT — PAIN SCALES - GENERAL
PAINLEVEL_OUTOF10: 3
PAINLEVEL_OUTOF10: 0

## 2025-06-05 ASSESSMENT — PAIN DESCRIPTION - PAIN TYPE: TYPE: CHRONIC PAIN

## 2025-06-05 ASSESSMENT — PAIN - FUNCTIONAL ASSESSMENT
PAIN_FUNCTIONAL_ASSESSMENT: WONG-BAKER FACES
PAIN_FUNCTIONAL_ASSESSMENT: PREVENTS OR INTERFERES SOME ACTIVE ACTIVITIES AND ADLS

## 2025-06-05 ASSESSMENT — PAIN SCALES - WONG BAKER: WONGBAKER_NUMERICALRESPONSE: NO HURT

## 2025-06-05 ASSESSMENT — PAIN DESCRIPTION - ONSET: ONSET: ON-GOING

## 2025-06-05 ASSESSMENT — PAIN DESCRIPTION - FREQUENCY: FREQUENCY: CONTINUOUS

## 2025-06-05 ASSESSMENT — PAIN DESCRIPTION - LOCATION: LOCATION: LEG

## 2025-06-05 ASSESSMENT — ENCOUNTER SYMPTOMS: SHORTNESS OF BREATH: 1

## 2025-06-05 ASSESSMENT — PAIN DESCRIPTION - ORIENTATION: ORIENTATION: LEFT;RIGHT

## 2025-06-05 ASSESSMENT — PAIN DESCRIPTION - DESCRIPTORS: DESCRIPTORS: ACHING

## 2025-06-05 NOTE — CONSULTS
Consult  Internal Medicine Resident         Patient: Vladimir Muñiz Sr. 75 y.o. male      : 1950  Date of Admission: 2025  Date of Service: Pt seen/examined on 25 and Admitted to Inpatient with expected LOS greater than two midnights due to medical therapy.       ASSESSMENT AND PLAN  Tracheobronchomalacia: s/p therapeutic trial of Y stent of distal trachea and mainstem bronchi (25) and holmium laser incisions to stiffen hyperdynamic mucosa (25) by Dr. Meyers and Abdiaziz.   ENT primary. Continue high flow   Pulmonary hygiene and nebulized therapy-sodium chloride nebulizer 0.9% 3mL every 4 hours and albuterol nebs 2.5mg BID.     MRSA Rash: Wound noted on left forearm. Notes itchiness, but has improved. [facility] called pre-procedure and rash positive for MRSA.   Obtain micro results from [facility]  Started on mupirocin 2% BID for 5 days on left forearm over rash.      Chronic Conditions (reviewed and stable unless otherwise stated)   CAD-Follows with Dr. Sanders. Hx stent to LAD. Lexiscan stress 2025 test negative for myocardial ischemia  Non- rheumatic aortic valve insufficiency- Echo showed mild to moderate aortic insufficiency.   Hyperlipidemia- On Pravastatin 40mg and ezetimibe 10 mg  Hypertension- On amlodipine 5mg and lisinopril 40mg daily  Obstructive sleep apnea-On CPAP at home  HFpEF- ECHO 2025 showed EF 55-60%. Diastolic dysfunction present with normal LV EF. GDMT-IMDUR, lisinopril, amlodipine, Toprol   Lumbar DDD  BPH-On Flomax 0.4 mg BID   GERD-Protonix 40mg BID    Data reviewed (unless otherwise discussed in assessment/plan)    Imaging: I have reviewed CXR with the following interpretation: CXR showed no acute intrathoracic process.   Labs: Reviewed, see chart and plan above.       =======================================================================    SUBJECTIVE    Chief Complaint:  planned holmium laser incisions to stiffen hyperdynamic mucosa     History Of Present  Illness:  Vladimir Muñiz Sr. is a 75 y.o. male with PMHx of CAD, tracheomalacia, aortic valve insufficiency, hyperlipidemia, hypertension, ROHIT, HFpEF, BPH, GERD, lumbar DDD who presents to [facility] for holmium laser incisions to stiffen hyperdynamic mucosa (06/05/25) by Dr. Meyers and Abdiaziz.  Has a history of worsening shortness of breath found to have tracheomalacia had a therapeutic trial of Y stent of the distal trachea and main bronchi in January 2025. Patient notes rash on forearm that is positive for MRSA. Patient notes itchiness on forearm where rash is located. Started on Mupirocin 2% ointment BID for 5 days. Post-procedure notes fatigue, no SOB.    Review of Systems:   Pertinent positives and negatives as noted in the HPI. Otherwise complete ROS negative.    OBJECTIVE  Physical Exam:  BP (!) 100/50   Pulse 63   Temp 97.2 °F (36.2 °C) (Temporal)   Resp 11   Ht 1.727 m (5' 8\")   Wt 74.5 kg (164 lb 4 oz)   SpO2 99%   BMI 24.97 kg/m²   General appearance: No apparent distress, appears stated age.   Eyes:  Pupils equal, round, and reactive to light. Conjunctivae/corneas clear.  HENT: Head normal in appearance. External nares normal. Oral mucosa moist without lesions. Hearing grossly intact.   Neck: Supple, with full range of motion.   Respiratory:  Normal respiratory effort. Clear to auscultation, bilaterally without rales or wheezes or rhonchi.  Cardiovascular: Normal rate, regular rhythm with normal S1/S2 without murmurs.  No lower extremity edema.   Abdomen: Soft, non-tender, non-distended with normal bowel sounds.  Musculoskeletal: No joint swelling or tenderness. Normal tone. No abnormal movements.   Skin: Warm and dry. Rash noted on left forearm.   Neurologic:  No focal sensory/motor deficits in the upper and lower extremities.   Psychiatric: Alert and oriented, normal insight and thought content.   Capillary Refill: Brisk,< 3 seconds.  Peripheral Pulses: +2 palpable, equal

## 2025-06-05 NOTE — ANESTHESIA POSTPROCEDURE EVALUATION
Department of Anesthesiology  Postprocedure Note    Patient: Vladimir Muñiz Sr.  MRN: [id number]  YOB: 1950  Date of evaluation: 6/5/2025    Procedure Summary       Date: 06/05/25 Room / Location: [facility] OR  / [facility] OR    Anesthesia Start: 1259 Anesthesia Stop: 1446    Procedures:       Tracheobronchoplasty      BRONCHOSCOPY with Alveolar Lavage Diagnosis:       Tracheobronchomalacia determined by bronchoscopy      Dyspnea on exertion      (Tracheobronchomalacia determined by bronchoscopy [J39.8])      (Dyspnea on exertion [R06.09])    Surgeons: Jeremy Meyers MD; Gustavo Mcnulty DO Responsible Provider: Vini Quan DO    Anesthesia Type: general, TIVA ASA Status: 3            Anesthesia Type: No value filed.    Koby Phase I: Koby Score: 8    Koby Phase II:      Anesthesia Post Evaluation    Patient location during evaluation: PACU  Patient participation: complete - patient participated  Level of consciousness: awake  Airway patency: patent  Nausea & Vomiting: no vomiting and no nausea  Cardiovascular status: hemodynamically stable  Respiratory status: acceptable and nasal cannula  Hydration status: stable  Comments: HIGH FLOW O2  Pain management: adequate    No notable events documented.

## 2025-06-05 NOTE — OP NOTE
Operative Note      Patient: Vladimir Muñiz Sr.  YOB: 1950  MRN: [id number]    Date of Procedure: 6/5/2025    Pre-Op Diagnosis Codes:      * Tracheobronchomalacia determined by bronchoscopy [J39.8]     * Dyspnea on exertion [R06.09]    Post-Op Diagnosis: Same       Procedure(s):  Tracheobronchoplasty  BRONCHOSCOPY with Alveolar Lavage    Surgeon(s):  Gustavo Mcnulty DO Castellanos, Paul, MD    Assistant:   * No surgical staff found *    Anesthesia: General    Estimated Blood Loss (mL): Minimal    Complications: None    Specimens:   * No specimens in log *    Implants:  * No implants in log *      Drains: * No LDAs found *    Findings:  Infection Present At Time Of Surgery (PATOS) (choose all levels that have infection present):  - Superficial Infection (skin/subcutaneous) present as evidenced by purulent fluid; MRSA impetigo both upper extremity  Other Findings: 1.  Hyperdynamic trachea and mainstem bronchi mucosa  2.  Treated with holmium laser incisions to stiffen hyperdynamic mucosa  3.  Laser settings of holmium laser using a 0.3 mm quartz fiber were 16 W average power density, 0.4 J per pulse at 40 repetitions per second  4.  Left mainstem bronchus 700 J  5.  Right mainstem bronchus 800 J  6.  Distal trachea 1250 J  7.  Proximal trachea 670 J for total of 3420 J      Detailed Description of Procedure:   The patient was taken to the operating waken placed in supine position.  General anesthesia was induced and the patient was intubated with a #8 endotracheal tube without difficulty or vital sign instability.  The table was turned 90 degrees and the patient was draped in usual fashion for transoral surgery.  A timeout was employed verifying the patient's identity and planned procedure.    With the patient sleep protected his eyes and his maxi mucosa in the usual manner and then passed a Bayron laryngoscope into the vallecula in order to expose the laryngeal inlet by drawing the epiglottis

## 2025-06-05 NOTE — ANESTHESIA PRE PROCEDURE
Postoperative opioids intended and Prophylactic antiemetics administered.  Anesthetic plan and risks discussed with patient.      Plan discussed with CRNA.                Vini Quan DO   6/5/2025

## 2025-06-05 NOTE — PROGRESS NOTES
Patient oriented to Same Day department and admitted to Same Day Surgery room 8.   Patient verbalized approval for first name, last initial with physician name on unit whiteboard.     Plan of care reviewed with patient.   Patient room whiteboard filled out and discussed with patient and responsible adult.       Call light in reach.   Bed in lowest position, locked, with one bed rail up.   SCDs and warming blanket in place.  Appropriate arm bands on patient.   Bathroom offered.   All questions and concerns of patient addressed.        Meds to Beds:   Patient informed of [facility] Meds to Beds program during admission. Patient has declined use of program.

## 2025-06-05 NOTE — PROGRESS NOTES
Bactroban and kerlix applied to right arm as ordered. Patient voices he has a couple spots on his right arm also. Bactroban and 2x2 applied to right arm.

## 2025-06-05 NOTE — H&P
HISTORY AND PHYSICAL             Date: 6/4/2025        Patient Name: Vladimir Muñiz     YOB: 1950      Age:  75 y.o.    Chief Complaint   No chief complaint on file.     ”Dyspnea on exertion”    History Obtained From   patient    History of Present Illness   The patient is a 75-year-old male with a long history of worsening dyspnea on exertion seen in consultation with Dr. Gustavo Freed, interventional pulmonology.  He was treated with a therapeutic trial of a Y stent of the distal trachea and mainstem bronchi with a definitive improvement of his ability to tolerate exertion and his ability to engage in activities that he has been wholly incapable of performing for several years.  After the stent was removed, the patient had. first stage laser tracheobronchoplasty procedure Dr. Freed.     Past Medical History     Past Medical History:   Diagnosis Date    Anxiety 02/16/2025    Arrhythmia     CAD (coronary artery disease)     stents placed    Dizziness     Ganglion cyst     GERD (gastroesophageal reflux disease) 04/15/2014    Headache 01/15/2014    Hyperlipidemia     Hypertension     Major depression 12/15/2014    ROHIT (obstructive sleep apnea) 02/16/2025    wears cpap    Osteoarthritis     Restless legs syndrome     RLS (restless legs syndrome)     SOB (shortness of breath)     Urinary retention 01/07/2025        Past Surgical History     Past Surgical History:   Procedure Laterality Date    BACK SURGERY      BRONCHOSCOPY N/A 01/06/2025    Bronchoscopy with Tracheal Stent Placement performed by Gustavo Mcnulty DO at [facility] OR    CHOLECYSTECTOMY, LAPAROSCOPIC      COLONOSCOPY N/A 04/22/2025    COLONOSCOPY performed by Clyde Camarena MD at [facility] ENDOSCOPY    LARYNGOSCOPY N/A 01/06/2025    LARYNGOSCOPY/TRACHEOSCOPY performed by Jeremy Meyers MD at [facility] OR    LUNG REMOVAL, TOTAL N/A     pt states didn't have lung removed    NECK SURGERY      TESTICLE SURGERY      UPPER GASTROINTESTINAL

## 2025-06-05 NOTE — H&P
Update History & Physical    The patient's History and Physical of Angy 3, was reviewed with the patient and I examined the patient. There was no change. The surgical site was confirmed by the patient and me. Plan to proceed with laser tracheobronchoplasty with Dr. Meyers.      Plan: The risks, benefits, expected outcome, and alternative to the recommended procedure have been discussed with the patient. Patient understands and wants to proceed with the procedure.     Electronically signed by VANNA RICH DO on 6/5/2025 at 12:19 PM

## 2025-06-05 NOTE — PROGRESS NOTES
Verified with [facility] lab that pt tested positive for MRSA on left arm rash.

## 2025-06-05 NOTE — PROGRESS NOTES
Notified Dr Meyers via OR charge nurse Edith that pt had positive MRSA on left arm 6/2/25. Has only been treated with cephalexin.-Order received to apply bacatroban to rash areas and cover.

## 2025-06-05 NOTE — PROGRESS NOTES
1443: Pt arrives to pacu, awakens to verbal stimuli and quickly drifts back to sleep. Pt on room air, placed on heated high flow, respirations easy and unlabored. VSS    1450: X-ray at bedside     1500: Pt resting off and on with eyes closed, easily awakens to voice. Denies pain    1513: Pt meets criteria for discharge from pacu, awaiting room to be ready.    1515: Report given to Ann-Marie on 4    1517: Called sds to have family sent to room.    1529: Pt transported to 4 in stable condition. VSS

## 2025-06-06 PROBLEM — I50.32 CHRONIC HEART FAILURE WITH PRESERVED EJECTION FRACTION (HFPEF) (HCC): Status: ACTIVE | Noted: 2025-06-06

## 2025-06-06 PROBLEM — I35.1 NONRHEUMATIC AORTIC VALVE INSUFFICIENCY: Status: ACTIVE | Noted: 2025-06-06

## 2025-06-06 PROBLEM — A49.02 MRSA INFECTION: Status: ACTIVE | Noted: 2025-06-06

## 2025-06-06 PROBLEM — Z86.79 HISTORY OF CAD (CORONARY ARTERY DISEASE): Status: ACTIVE | Noted: 2025-06-06

## 2025-06-06 LAB
ANION GAP SERPL CALC-SCNC: 9 MEQ/L (ref 8–16)
BASOPHILS ABSOLUTE: 0 THOU/MM3 (ref 0–0.1)
BASOPHILS NFR BLD AUTO: 0.1 %
BUN SERPL-MCNC: 18 MG/DL (ref 8–23)
CALCIUM SERPL-MCNC: 8.3 MG/DL (ref 8.8–10.2)
CHLORIDE SERPL-SCNC: 108 MEQ/L (ref 98–111)
CO2 SERPL-SCNC: 23 MEQ/L (ref 22–29)
CREAT SERPL-MCNC: 0.9 MG/DL (ref 0.7–1.2)
DEPRECATED RDW RBC AUTO: 43.6 FL (ref 35–45)
EOSINOPHIL NFR BLD AUTO: 0 %
EOSINOPHILS ABSOLUTE: 0 THOU/MM3 (ref 0–0.4)
ERYTHROCYTE [DISTWIDTH] IN BLOOD BY AUTOMATED COUNT: 12.9 % (ref 11.5–14.5)
GFR SERPL CREATININE-BSD FRML MDRD: 88 ML/MIN/1.73M2
GLUCOSE SERPL-MCNC: 111 MG/DL (ref 74–109)
HCT VFR BLD AUTO: 37.1 % (ref 42–52)
HGB BLD-MCNC: 11.9 GM/DL (ref 14–18)
IMM GRANULOCYTES # BLD AUTO: 0.03 THOU/MM3 (ref 0–0.07)
IMM GRANULOCYTES NFR BLD AUTO: 0.4 %
LYMPHOCYTES ABSOLUTE: 1.1 THOU/MM3 (ref 1–4.8)
LYMPHOCYTES NFR BLD AUTO: 12.3 %
MCH RBC QN AUTO: 29.5 PG (ref 26–33)
MCHC RBC AUTO-ENTMCNC: 32.1 GM/DL (ref 32.2–35.5)
MCV RBC AUTO: 91.8 FL (ref 80–94)
MONOCYTES ABSOLUTE: 0.7 THOU/MM3 (ref 0.4–1.3)
MONOCYTES NFR BLD AUTO: 7.9 %
NEUTROPHILS ABSOLUTE: 6.8 THOU/MM3 (ref 1.8–7.7)
NEUTROPHILS NFR BLD AUTO: 79.3 %
NRBC BLD AUTO-RTO: 0 /100 WBC
PLATELET # BLD AUTO: 165 THOU/MM3 (ref 130–400)
PMV BLD AUTO: 11.4 FL (ref 9.4–12.4)
POTASSIUM SERPL-SCNC: 4.5 MEQ/L (ref 3.5–5.2)
RBC # BLD AUTO: 4.04 MILL/MM3 (ref 4.7–6.1)
SODIUM SERPL-SCNC: 140 MEQ/L (ref 135–145)
WBC # BLD AUTO: 8.6 THOU/MM3 (ref 4.8–10.8)

## 2025-06-06 PROCEDURE — 2700000000 HC OXYGEN THERAPY PER DAY

## 2025-06-06 PROCEDURE — 99024 POSTOP FOLLOW-UP VISIT: CPT

## 2025-06-06 PROCEDURE — 6370000000 HC RX 637 (ALT 250 FOR IP): Performed by: STUDENT IN AN ORGANIZED HEALTH CARE EDUCATION/TRAINING PROGRAM

## 2025-06-06 PROCEDURE — 99232 SBSQ HOSP IP/OBS MODERATE 35: CPT | Performed by: STUDENT IN AN ORGANIZED HEALTH CARE EDUCATION/TRAINING PROGRAM

## 2025-06-06 PROCEDURE — 80048 BASIC METABOLIC PNL TOTAL CA: CPT

## 2025-06-06 PROCEDURE — 85025 COMPLETE CBC W/AUTO DIFF WBC: CPT

## 2025-06-06 PROCEDURE — 94640 AIRWAY INHALATION TREATMENT: CPT

## 2025-06-06 PROCEDURE — 94761 N-INVAS EAR/PLS OXIMETRY MLT: CPT

## 2025-06-06 PROCEDURE — 94667 MNPJ CHEST WALL 1ST: CPT

## 2025-06-06 PROCEDURE — 6360000002 HC RX W HCPCS: Performed by: OTOLARYNGOLOGY

## 2025-06-06 PROCEDURE — 6370000000 HC RX 637 (ALT 250 FOR IP): Performed by: OTOLARYNGOLOGY

## 2025-06-06 PROCEDURE — 2580000003 HC RX 258: Performed by: OTOLARYNGOLOGY

## 2025-06-06 PROCEDURE — 36415 COLL VENOUS BLD VENIPUNCTURE: CPT

## 2025-06-06 PROCEDURE — 2060000000 HC ICU INTERMEDIATE R&B

## 2025-06-06 RX ORDER — ALBUTEROL SULFATE 0.83 MG/ML
2.5 SOLUTION RESPIRATORY (INHALATION) EVERY 4 HOURS PRN
Status: DISCONTINUED | OUTPATIENT
Start: 2025-06-06 | End: 2025-06-09 | Stop reason: HOSPADM

## 2025-06-06 RX ADMIN — ALBUTEROL SULFATE 2.5 MG: 2.5 SOLUTION RESPIRATORY (INHALATION) at 09:32

## 2025-06-06 RX ADMIN — MUPIROCIN: 20 OINTMENT TOPICAL at 08:27

## 2025-06-06 RX ADMIN — FAMOTIDINE 40 MG: 20 TABLET, FILM COATED ORAL at 08:26

## 2025-06-06 RX ADMIN — AMITRIPTYLINE HYDROCHLORIDE 50 MG: 50 TABLET ORAL at 20:51

## 2025-06-06 RX ADMIN — TERBINAFINE 250 MG: 250 TABLET ORAL at 08:26

## 2025-06-06 RX ADMIN — AMPICILLIN SODIUM AND SULBACTAM SODIUM 3000 MG: 2; 1 INJECTION, POWDER, FOR SOLUTION INTRAMUSCULAR; INTRAVENOUS at 06:31

## 2025-06-06 RX ADMIN — SULFASALAZINE 500 MG: 500 TABLET ORAL at 08:26

## 2025-06-06 RX ADMIN — ISODIUM CHLORIDE 3 ML: 0.03 SOLUTION RESPIRATORY (INHALATION) at 09:32

## 2025-06-06 RX ADMIN — SODIUM CHLORIDE, SODIUM LACTATE, POTASSIUM CHLORIDE, AND CALCIUM CHLORIDE: .6; .31; .03; .02 INJECTION, SOLUTION INTRAVENOUS at 03:53

## 2025-06-06 RX ADMIN — HYDROXYZINE HYDROCHLORIDE 10 MG: 10 TABLET ORAL at 20:51

## 2025-06-06 RX ADMIN — PANTOPRAZOLE SODIUM 40 MG: 40 TABLET, DELAYED RELEASE ORAL at 05:15

## 2025-06-06 RX ADMIN — GABAPENTIN 400 MG: 400 CAPSULE ORAL at 08:26

## 2025-06-06 RX ADMIN — OXYBUTYNIN CHLORIDE 5 MG: 5 TABLET, EXTENDED RELEASE ORAL at 08:26

## 2025-06-06 RX ADMIN — ISODIUM CHLORIDE 3 ML: 0.03 SOLUTION RESPIRATORY (INHALATION) at 20:42

## 2025-06-06 RX ADMIN — AMPICILLIN SODIUM AND SULBACTAM SODIUM 3000 MG: 2; 1 INJECTION, POWDER, FOR SOLUTION INTRAMUSCULAR; INTRAVENOUS at 00:56

## 2025-06-06 RX ADMIN — TAMSULOSIN HYDROCHLORIDE 0.4 MG: 0.4 CAPSULE ORAL at 20:51

## 2025-06-06 RX ADMIN — TAMSULOSIN HYDROCHLORIDE 0.4 MG: 0.4 CAPSULE ORAL at 08:26

## 2025-06-06 RX ADMIN — AMPICILLIN SODIUM AND SULBACTAM SODIUM 3000 MG: 2; 1 INJECTION, POWDER, FOR SOLUTION INTRAMUSCULAR; INTRAVENOUS at 18:36

## 2025-06-06 RX ADMIN — POLYETHYLENE GLYCOL (3350) 17 G: 17 POWDER, FOR SOLUTION ORAL at 08:31

## 2025-06-06 RX ADMIN — AMLODIPINE BESYLATE 5 MG: 5 TABLET ORAL at 08:26

## 2025-06-06 RX ADMIN — ISODIUM CHLORIDE 3 ML: 0.03 SOLUTION RESPIRATORY (INHALATION) at 05:18

## 2025-06-06 RX ADMIN — PANTOPRAZOLE SODIUM 40 MG: 40 TABLET, DELAYED RELEASE ORAL at 14:47

## 2025-06-06 RX ADMIN — ISODIUM CHLORIDE 3 ML: 0.03 SOLUTION RESPIRATORY (INHALATION) at 15:25

## 2025-06-06 RX ADMIN — AMPICILLIN SODIUM AND SULBACTAM SODIUM 3000 MG: 2; 1 INJECTION, POWDER, FOR SOLUTION INTRAMUSCULAR; INTRAVENOUS at 12:53

## 2025-06-06 RX ADMIN — BENZONATATE 200 MG: 100 CAPSULE ORAL at 03:56

## 2025-06-06 RX ADMIN — GABAPENTIN 400 MG: 400 CAPSULE ORAL at 14:46

## 2025-06-06 RX ADMIN — MUPIROCIN: 20 OINTMENT TOPICAL at 20:51

## 2025-06-06 RX ADMIN — ASPIRIN 81 MG: 81 TABLET, COATED ORAL at 08:26

## 2025-06-06 RX ADMIN — ISODIUM CHLORIDE 3 ML: 0.03 SOLUTION RESPIRATORY (INHALATION) at 01:02

## 2025-06-06 RX ADMIN — ISOSORBIDE MONONITRATE 60 MG: 60 TABLET, EXTENDED RELEASE ORAL at 08:26

## 2025-06-06 RX ADMIN — METOPROLOL SUCCINATE 25 MG: 25 TABLET, EXTENDED RELEASE ORAL at 08:26

## 2025-06-06 RX ADMIN — LISINOPRIL 40 MG: 40 TABLET ORAL at 08:26

## 2025-06-06 RX ADMIN — Medication 3 MG: at 20:51

## 2025-06-06 RX ADMIN — PRAVASTATIN SODIUM 40 MG: 40 TABLET ORAL at 20:51

## 2025-06-06 RX ADMIN — EZETIMIBE 10 MG: 10 TABLET ORAL at 08:26

## 2025-06-06 NOTE — PROGRESS NOTES
Hospitalist Progress Note  Internal Medicine Resident      Patient: Vladimir Muñiz Sr. 75 y.o. male      Unit/Bed: -23/023-A    Admit Date: 6/5/2025      ASSESSMENT AND PLAN  Active Problems  Tracheobronchomalacia: s/p therapeutic trial of Y stent of distal trachea and mainstem bronchi (01/06/25) and holmium laser incisions to stiffen hyperdynamic mucosa (06/05/25) by Dr. aBllard.   ENT primary. Continue high flow for humidification of healing tissues.   Pulmonary hygiene and nebulized therapy-sodium chloride nebulizer 0.9% 3mL every 4 hours and albuterol nebs 2.5mg BID.   On IV Unasyn for surgical prophylaxis     MRSA Rash: Wound noted on left forearm. Notes itchiness, but has improved. [facility] called pre-procedure and rash positive for MRSA.   Micro results from [facility] pending  Started on mupirocin 2% BID for 5 days on left forearm over rash. (06/05-06/09)    Resolved Problems    Chronic Conditions (reviewed and stable unless otherwise stated)   CAD-Follows with Dr. Sanders. Hx stent to LAD. 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   Hyperlipidemia- On Pravastatin 40mg and ezetimibe 10 mg  Hypertension- On amlodipine 5mg and lisinopril 40mg daily  Obstructive sleep apnea-On CPAP at home  HFpEF- ECHO 02/2025 showed EF 55-60%. Diastolic dysfunction present with normal LV EF. GDMT-IMDUR, lisinopril, amlodipine, Toprol   Lumbar DDD  BPH, hx urinary retention-On Flomax 0.4 mg BID   GERD-Protonix 40mg BID      LDA: []CVC / []PICC / []Midline / []Russ / []Drains / []Mediport / [x]None  Antibiotics: Unasyn and Mupirocin  Steroids: n/a  Labs (still needed?): [x]Yes / []No  IVF (still needed?): [x]Yes / []No    Level of care: [x]Step Down / []Med-Surg  Bed Status: [x]Inpatient / []Observation  Telemetry: [x]Yes / []No  PT/OT: []Yes / [x]No    DVT Prophylaxis: [] Lovenox / [] Heparin / [x] SCDs /  provider] prasugrel  5 mg Oral Daily    pravastatin  40 mg Oral Nightly    sulfaSALAzine  500 mg Oral Daily    tamsulosin  0.4 mg Oral BID    terbinafine  250 mg Oral Daily    ampicillin-sulbactam  3,000 mg IntraVENous Q6H    PRN Meds: albuterol, sodium chloride flush, sodium chloride, ondansetron **OR** ondansetron, ibuprofen, traMADol **OR** traMADol, sodium chloride (Inhalant), benzonatate, nitroGLYCERIN    Exam:  /64   Pulse 73   Temp 98.4 °F (36.9 °C) (Oral)   Resp 17   Ht 1.727 m (5' 8\")   Wt 74.5 kg (164 lb 4 oz)   SpO2 96%   BMI 24.97 kg/m²   General: No distress, appears stated age.   Eyes:  PERRL. Conjunctivae/corneas clear.  HENT: Head normal appearing. Nares normal. Oral mucosa moist.  Hearing intact.   Neck: Supple, with full range of motion. Trachea midline.  No gross JVD appreciated.  Respiratory:  Normal effort. Clear to auscultation, without rales or wheezes or rhonchi.  Cardiovascular: Normal rate, regular rhythm with normal S1/S2 without murmurs.    No lower extremity edema.   Abdomen: Soft, non-tender, non-distended with normal bowel sounds.  Musculoskeletal: No joint swelling or tenderness. Normal tone. No abnormal movements.   Skin: Warm and dry. Rash on left forearm.   Neurologic:  No focal sensory/motor deficits in the upper or lower extremities.   Psychiatric: Alert and oriented, normal insight and thought content.   Capillary Refill: Brisk,< 3 seconds.  Peripheral Pulses: +2 palpable, equal bilaterally.       Labs/Radiology: See chart or assessment above.     Electronically signed by Vania Carter MD on 6/6/2025 at 10:55 AM  Case was discussed with Attending, Dr. Behl.

## 2025-06-06 NOTE — CARE COORDINATION
Case Management Assessment Initial Evaluation    Date/Time of Evaluation: 6/6/2025 10:17 AM  Assessment Completed by: Jm Mckinney RN    If patient is discharged prior to next notation, then this note serves as note for discharge by case management.    Patient Name: Vladimir Muñiz Sr.                   YOB: 1950  Diagnosis: Tracheobronchomalacia determined by bronchoscopy [J39.8]  Dyspnea on exertion [R06.09]  Airway clearance impairment [R06.89]                   Date / Time: 6/5/2025  9:43 AM  Location: [address] [place] [address]     Patient Admission Status: Inpatient   Readmission Risk Low 0-14, Mod 15-19), High > 20: Readmission Risk Score: 13.4    Current PCP: Eric Ramírez MD  Health Care Decision Makers:     Additional Case Management Notes:   From [facility]  Tracheobronchomalacia  PMH: Tracheobronchomalacia  HFO 30% FIO2/60L  IVF  IV Ampicillin  Procedures:   6/5  Tracheobronchoplasty  BRONCHOSCOPY with Alveolar Lavage  Patient Goals/Plan/Treatment Preferences: plans home w mentally handicapped daughter Mary (like 8-10 year old mentation, attends [employer] 8 am - 3 pm), SR HME CPAP, cane, walker, electric WC, still drives           06/06/25 1013   Service Assessment   Patient Orientation Alert and Oriented   Cognition Alert   History Provided By Patient;Medical Record   Primary Caregiver Self   Accompanied By/Relationship daughter   Support Systems Children;Family Members   Patient's Healthcare Decision Maker is: Legal Next of Kin   PCP Verified by CM Yes   Last Visit to PCP Within last 3 months   Prior Functional Level Independent in ADLs/IADLs   Current Functional Level Independent in ADLs/IADLs   Can patient return to prior living arrangement Yes   Ability to make needs known: Good   Family able to assist with home care needs: No   Would you like for me to discuss the discharge plan with any other family members/significant others, and if so, who? No   Financial Resources  Medicare   Community Resources None   CM/SW Referral ADLs/IADLs   Social/Functional History   Lives With Daughter;Family   Type of Home House   Home Layout One level   Active  Yes   Discharge Planning   Type of Residence House   Living Arrangements Children;Family Members   Current Services Prior To Admission Durable Medical Equipment   Current DME Prior to Arrival Cane;Walker;Wheelchair;Cpap   Potential Assistance Needed N/A   DME Ordered? No   Potential Assistance Purchasing Medications No   Type of Home Care Services None   Patient expects to be discharged to: House   Follow Up Appointment: Best Day/Time    (am)   One/Two Story Residence One story   History of falls? 1   Services At/After Discharge   Transition of Care Consult (CM Consult) Discharge Planning   Services At/After Discharge None    Resource Information Provided? No   Mode of Transport at Discharge Self   Confirm Follow Up Transport Self   Condition of Participation: Discharge Planning   The Plan for Transition of Care is related to the following treatment goals: ENT Procedure/Treatment   Freedom of Choice list was provided with basic dialogue that supports the patient's individualized plan of care/goals, treatment preferences, and shares the quality data associated with the providers?  No

## 2025-06-06 NOTE — PLAN OF CARE
Problem: Discharge Planning  Goal: Discharge to home or other facility with appropriate resources  Outcome: Progressing  Flowsheets  Taken 6/5/2025 2334 by Marley Calle RN  Discharge to home or other facility with appropriate resources:   Identify barriers to discharge with patient and caregiver   Arrange for needed discharge resources and transportation as appropriate   Identify discharge learning needs (meds, wound care, etc)   Refer to discharge planning if patient needs post-hospital services based on physician order or complex needs related to functional status, cognitive ability or social support system  Taken 6/5/2025 1537 by Ann-Marie Moses RN  Discharge to home or other facility with appropriate resources:   Identify barriers to discharge with patient and caregiver   Identify discharge learning needs (meds, wound care, etc)     Problem: Pain  Goal: Verbalizes/displays adequate comfort level or baseline comfort level  Outcome: Progressing  Flowsheets (Taken 6/5/2025 2334)  Verbalizes/displays adequate comfort level or baseline comfort level:   Encourage patient to monitor pain and request assistance   Assess pain using appropriate pain scale   Administer analgesics based on type and severity of pain and evaluate response   Implement non-pharmacological measures as appropriate and evaluate response   Notify Licensed Independent Practitioner if interventions unsuccessful or patient reports new pain     Problem: Safety - Adult  Goal: Free from fall injury  Outcome: Progressing  Flowsheets (Taken 6/5/2025 2334)  Free From Fall Injury: Instruct family/caregiver on patient safety     Problem: Respiratory - Adult  Goal: Lung sounds clear or within normal limits for patient  6/5/2025 1710 by Waqar Chong, P  Outcome: Progressing

## 2025-06-06 NOTE — PROGRESS NOTES
Patient received sacramental anointing by a . If you are in need of  support, please call [phone]. If you are in need of a  after 6:30pm, please call the house supervisor for the on-call .     06/06/25 1502   Encounter Summary   Encounter Overview/Reason  Encounter   Service Provided For Patient   Referral/Consult From [facility]   Support System Family members   Last Encounter  06/06/25  (Anointed)   Complexity of Encounter Low   Begin Time 1300   End Time  1306   Total Time Calculated 6 min   Spiritual/Emotional needs   Type Spiritual Support   Rituals, Rites and Sacraments   Type Anointing   Assessment/Intervention/Outcome   Assessment Hopeful   Intervention Empowerment   Outcome Encouraged

## 2025-06-06 NOTE — PROGRESS NOTES
Physician Progress Note      PATIENT:               ALCON MONTIEL  CSN #:                  [id number]  :                       1950  ADMIT DATE:       2025 9:43 AM  DISCH DATE:  RESPONDING  PROVIDER #:        Vania Carter MD          QUERY TEXT:    BPH is documented in the medical record in IM note on . Please specify the   associated urinary conditions:    The clinical indicators include:  -75 yr  old, CAD, CHF, GERD, HTN, ROHIT    - Documented in IM note on : \"BPH-On Flomax 0.4 mg BID\"    - Medication management, lab monitoring, I & Os  Options provided:  -- BPH with partial/complete urinary obstruction  -- BPH with urinary retention without obstruction  -- Other - I will add my own diagnosis  -- Disagree - Not applicable / Not valid  -- Disagree - Clinically unable to determine / Unknown  -- Refer to Clinical Documentation Reviewer    PROVIDER RESPONSE TEXT:    This patient has BPH with urinary retention without obstruction.    Query created by: Ladonna Monroe on 2025 9:49 AM      Electronically signed by:  Vania Carter MD 2025 11:01 AM

## 2025-06-06 NOTE — PROGRESS NOTES
Oral, BID  terbinafine (LAMISIL) tablet 250 mg, 250 mg, Oral, Daily  ampicillin-sulbactam (UNASYN) 3,000 mg in sodium chloride 0.9 % 100 mL IVPB (addEASE), 3,000 mg, IntraVENous, Q6H    ASSESSMENT AND PLAN      POD 1 Tracheobronchoplasty, BRONCHOSCOPY with Alveolar Lavage    - Continue IV Unasyn for surgical prophylaxis  - Continue aggressive pulmonary toilet and nebulizers as ordered. Monitor for excessive mucus that pt is unable to clear.  - Continue HFNC for humidification of healing tissues, can take breaks if O2 stats allow.  - Encourage frequent ambulation  - Continue regular diet. Decreased IVF, from ENT standpoint can discontinue later today if drinking well.  - Pt will be in house 48-72 hours to monitor for excessive secretions which would need cleared    Electronically signed by Marine Murcia PA-C on 6/6/2025 at 10:34 AM

## 2025-06-07 LAB
ANION GAP SERPL CALC-SCNC: 9 MEQ/L (ref 8–16)
BASOPHILS ABSOLUTE: 0 THOU/MM3 (ref 0–0.1)
BASOPHILS NFR BLD AUTO: 0.4 %
BUN SERPL-MCNC: 18 MG/DL (ref 8–23)
CALCIUM SERPL-MCNC: 9 MG/DL (ref 8.8–10.2)
CHLORIDE SERPL-SCNC: 107 MEQ/L (ref 98–111)
CO2 SERPL-SCNC: 27 MEQ/L (ref 22–29)
CREAT SERPL-MCNC: 1.1 MG/DL (ref 0.7–1.2)
DEPRECATED RDW RBC AUTO: 44.7 FL (ref 35–45)
EOSINOPHIL NFR BLD AUTO: 0.8 %
EOSINOPHILS ABSOLUTE: 0.1 THOU/MM3 (ref 0–0.4)
ERYTHROCYTE [DISTWIDTH] IN BLOOD BY AUTOMATED COUNT: 13.2 % (ref 11.5–14.5)
GFR SERPL CREATININE-BSD FRML MDRD: 69 ML/MIN/1.73M2
GLUCOSE SERPL-MCNC: 85 MG/DL (ref 74–109)
HCT VFR BLD AUTO: 39.8 % (ref 42–52)
HGB BLD-MCNC: 12.7 GM/DL (ref 14–18)
IMM GRANULOCYTES # BLD AUTO: 0.02 THOU/MM3 (ref 0–0.07)
IMM GRANULOCYTES NFR BLD AUTO: 0.2 %
LYMPHOCYTES ABSOLUTE: 2.4 THOU/MM3 (ref 1–4.8)
LYMPHOCYTES NFR BLD AUTO: 26.1 %
Lab: NORMAL
MCH RBC QN AUTO: 29.6 PG (ref 26–33)
MCHC RBC AUTO-ENTMCNC: 31.9 GM/DL (ref 32.2–35.5)
MCV RBC AUTO: 92.8 FL (ref 80–94)
MONOCYTES ABSOLUTE: 0.7 THOU/MM3 (ref 0.4–1.3)
MONOCYTES NFR BLD AUTO: 7.6 %
NEUTROPHILS ABSOLUTE: 5.9 THOU/MM3 (ref 1.8–7.7)
NEUTROPHILS NFR BLD AUTO: 64.9 %
NRBC BLD AUTO-RTO: 0 /100 WBC
PLATELET # BLD AUTO: 163 THOU/MM3 (ref 130–400)
PMV BLD AUTO: 11.3 FL (ref 9.4–12.4)
POTASSIUM SERPL-SCNC: 4.8 MEQ/L (ref 3.5–5.2)
RBC # BLD AUTO: 4.29 MILL/MM3 (ref 4.7–6.1)
SODIUM SERPL-SCNC: 143 MEQ/L (ref 135–145)
WBC # BLD AUTO: 9.1 THOU/MM3 (ref 4.8–10.8)

## 2025-06-07 PROCEDURE — 2500000003 HC RX 250 WO HCPCS: Performed by: OTOLARYNGOLOGY

## 2025-06-07 PROCEDURE — 36415 COLL VENOUS BLD VENIPUNCTURE: CPT

## 2025-06-07 PROCEDURE — 80048 BASIC METABOLIC PNL TOTAL CA: CPT

## 2025-06-07 PROCEDURE — 99232 SBSQ HOSP IP/OBS MODERATE 35: CPT | Performed by: STUDENT IN AN ORGANIZED HEALTH CARE EDUCATION/TRAINING PROGRAM

## 2025-06-07 PROCEDURE — 6370000000 HC RX 637 (ALT 250 FOR IP): Performed by: OTOLARYNGOLOGY

## 2025-06-07 PROCEDURE — 6360000002 HC RX W HCPCS: Performed by: OTOLARYNGOLOGY

## 2025-06-07 PROCEDURE — 2060000000 HC ICU INTERMEDIATE R&B

## 2025-06-07 PROCEDURE — 94669 MECHANICAL CHEST WALL OSCILL: CPT

## 2025-06-07 PROCEDURE — 2700000000 HC OXYGEN THERAPY PER DAY

## 2025-06-07 PROCEDURE — 6370000000 HC RX 637 (ALT 250 FOR IP): Performed by: STUDENT IN AN ORGANIZED HEALTH CARE EDUCATION/TRAINING PROGRAM

## 2025-06-07 PROCEDURE — 94640 AIRWAY INHALATION TREATMENT: CPT

## 2025-06-07 PROCEDURE — 2580000003 HC RX 258: Performed by: OTOLARYNGOLOGY

## 2025-06-07 PROCEDURE — 85025 COMPLETE CBC W/AUTO DIFF WBC: CPT

## 2025-06-07 PROCEDURE — 94761 N-INVAS EAR/PLS OXIMETRY MLT: CPT

## 2025-06-07 RX ADMIN — SODIUM CHLORIDE, PRESERVATIVE FREE 10 ML: 5 INJECTION INTRAVENOUS at 20:35

## 2025-06-07 RX ADMIN — AMPICILLIN SODIUM AND SULBACTAM SODIUM 3000 MG: 2; 1 INJECTION, POWDER, FOR SOLUTION INTRAMUSCULAR; INTRAVENOUS at 12:20

## 2025-06-07 RX ADMIN — ISODIUM CHLORIDE 3 ML: 0.03 SOLUTION RESPIRATORY (INHALATION) at 15:10

## 2025-06-07 RX ADMIN — EZETIMIBE 10 MG: 10 TABLET ORAL at 08:18

## 2025-06-07 RX ADMIN — LISINOPRIL 40 MG: 40 TABLET ORAL at 08:18

## 2025-06-07 RX ADMIN — OXYBUTYNIN CHLORIDE 5 MG: 5 TABLET, EXTENDED RELEASE ORAL at 08:23

## 2025-06-07 RX ADMIN — MUPIROCIN: 20 OINTMENT TOPICAL at 08:21

## 2025-06-07 RX ADMIN — GABAPENTIN 400 MG: 400 CAPSULE ORAL at 00:46

## 2025-06-07 RX ADMIN — AMITRIPTYLINE HYDROCHLORIDE 50 MG: 50 TABLET ORAL at 20:34

## 2025-06-07 RX ADMIN — GABAPENTIN 400 MG: 400 CAPSULE ORAL at 16:33

## 2025-06-07 RX ADMIN — ISOSORBIDE MONONITRATE 60 MG: 60 TABLET, EXTENDED RELEASE ORAL at 08:18

## 2025-06-07 RX ADMIN — SULFASALAZINE 500 MG: 500 TABLET ORAL at 08:18

## 2025-06-07 RX ADMIN — METOPROLOL SUCCINATE 25 MG: 25 TABLET, EXTENDED RELEASE ORAL at 08:18

## 2025-06-07 RX ADMIN — MUPIROCIN: 20 OINTMENT TOPICAL at 20:34

## 2025-06-07 RX ADMIN — AMPICILLIN SODIUM AND SULBACTAM SODIUM 3000 MG: 2; 1 INJECTION, POWDER, FOR SOLUTION INTRAMUSCULAR; INTRAVENOUS at 19:35

## 2025-06-07 RX ADMIN — FAMOTIDINE 40 MG: 20 TABLET, FILM COATED ORAL at 08:18

## 2025-06-07 RX ADMIN — GABAPENTIN 400 MG: 400 CAPSULE ORAL at 20:34

## 2025-06-07 RX ADMIN — GABAPENTIN 400 MG: 400 CAPSULE ORAL at 08:20

## 2025-06-07 RX ADMIN — ISODIUM CHLORIDE 3 ML: 0.03 SOLUTION RESPIRATORY (INHALATION) at 21:16

## 2025-06-07 RX ADMIN — TAMSULOSIN HYDROCHLORIDE 0.4 MG: 0.4 CAPSULE ORAL at 08:18

## 2025-06-07 RX ADMIN — SODIUM CHLORIDE, PRESERVATIVE FREE 10 ML: 5 INJECTION INTRAVENOUS at 09:00

## 2025-06-07 RX ADMIN — PRAVASTATIN SODIUM 40 MG: 40 TABLET ORAL at 20:34

## 2025-06-07 RX ADMIN — Medication 3 MG: at 20:34

## 2025-06-07 RX ADMIN — TAMSULOSIN HYDROCHLORIDE 0.4 MG: 0.4 CAPSULE ORAL at 20:34

## 2025-06-07 RX ADMIN — AMLODIPINE BESYLATE 5 MG: 5 TABLET ORAL at 08:20

## 2025-06-07 RX ADMIN — PANTOPRAZOLE SODIUM 40 MG: 40 TABLET, DELAYED RELEASE ORAL at 16:33

## 2025-06-07 RX ADMIN — HYDROXYZINE HYDROCHLORIDE 10 MG: 10 TABLET ORAL at 20:34

## 2025-06-07 RX ADMIN — ISODIUM CHLORIDE 3 ML: 0.03 SOLUTION RESPIRATORY (INHALATION) at 09:31

## 2025-06-07 RX ADMIN — POLYETHYLENE GLYCOL (3350) 17 G: 17 POWDER, FOR SOLUTION ORAL at 08:20

## 2025-06-07 RX ADMIN — ASPIRIN 81 MG: 81 TABLET, COATED ORAL at 08:18

## 2025-06-07 RX ADMIN — PANTOPRAZOLE SODIUM 40 MG: 40 TABLET, DELAYED RELEASE ORAL at 08:22

## 2025-06-07 RX ADMIN — TERBINAFINE 250 MG: 250 TABLET ORAL at 08:24

## 2025-06-07 RX ADMIN — AMPICILLIN SODIUM AND SULBACTAM SODIUM 3000 MG: 2; 1 INJECTION, POWDER, FOR SOLUTION INTRAMUSCULAR; INTRAVENOUS at 06:28

## 2025-06-07 RX ADMIN — AMPICILLIN SODIUM AND SULBACTAM SODIUM 3000 MG: 2; 1 INJECTION, POWDER, FOR SOLUTION INTRAMUSCULAR; INTRAVENOUS at 00:47

## 2025-06-07 ASSESSMENT — PAIN SCALES - GENERAL
PAINLEVEL_OUTOF10: 0

## 2025-06-07 NOTE — PROGRESS NOTES
Progress Note  Date:2025       Room:[facility]23/023-A  Patient Name:Vladimir Muñiz Sr.     YOB: 1950     Age:75 y.o.    Postop day #2 Hospital day #2 status post laser tracheobronchoplasty for excessive dynamic airway collapse with respiratory failure.  The patient reports breathing much better than he did preoperatively.  He also reports the benefit of the chest PT that he is getting as well as his nebulizer therapy.  He has not been walking except to his bathroom.  He denies pain.    Subjective    Subjective   As above  Review of Systems  As above  Objective         Vitals Last 24 Hours:  TEMPERATURE:  Temp  Av.9 °F (36.6 °C)  Min: 97.4 °F (36.3 °C)  Max: 98.4 °F (36.9 °C)  RESPIRATIONS RANGE: Resp  Av.7  Min: 16  Max: 19  PULSE OXIMETRY RANGE: SpO2  Av.6 %  Min: 92 %  Max: 98 %  PULSE RANGE: Pulse  Av.7  Min: 58  Max: 91  BLOOD PRESSURE RANGE: Systolic (24hrs), Av , Min:122 , Max:158   ; Diastolic (24hrs), Av, Min:51, Max:70    I/O (24Hr):    Intake/Output Summary (Last 24 hours) at 2025 1101  Last data filed at 2025 0743  Gross per 24 hour   Intake 150 ml   Output 2700 ml   Net -2550 ml     Objective    On general physical exam the patient is a pleasant older adult male in no acute distress.  His voice and speech pattern are baseline for the patient.  Occasionally has trouble understanding instructions but usually comprehends them on the second iteration.  He was able to cooperate with his auscultation which demonstrated nearly completely clear left-sided lung sounds.  On the right he had more rhonchi with marked obstructive sounds of the right lower lobe.  Once he was able to breathe deeply enough he could mobilize thick secretions that were in the right lower lobe and began to aerated.    I put a probe around his back and guided the patient and his son out of his room to walk the hallways.  He was able to do this without desaturation and without being short of  breath.    Labs/Imaging/Diagnostics    Labs:  CBC:  Recent Labs     06/05/25  1627 06/06/25  0520 06/07/25  0544   WBC 4.7* 8.6 9.1   RBC 3.97* 4.04* 4.29*   HGB 11.7* 11.9* 12.7*   HCT 37.7* 37.1* 39.8*   MCV 95.0* 91.8 92.8    165 163     CHEMISTRIES:  Recent Labs     06/05/25  1627 06/06/25  0520 06/07/25  0544    140 143   K 4.4 4.5 4.8    108 107   CO2 20* 23 27   BUN 18 18 18   CREATININE 0.9 0.9 1.1   GLUCOSE 117* 111* 85     PT/INR:No results for input(s): \"PROTIME\", \"INR\" in the last 72 hours.  APTT:No results for input(s): \"APTT\" in the last 72 hours.  LIVER PROFILE:No results for input(s): \"AST\", \"ALT\", \"BILIDIR\", \"BILITOT\", \"ALKPHOS\" in the last 72 hours.    Imaging Last 24 Hours:  XR CHEST PORTABLE  Result Date: 6/5/2025  PROCEDURE: XR CHEST PORTABLE CLINICAL INFORMATION: post jet ventilation. COMPARISON: Radiographs 2/15/2025 TECHNIQUE: AP upright view of the chest was obtained. FINDINGS: Fusion hardware is in the cervical spine. The lungs are clear. The cardiac silhouette and pulmonary vasculature are within normal limits. There is no significant pleural effusion or pneumothorax. Visualized portions of the upper abdomen are within normal limits. The osseous structures are intact. No acute fractures or suspicious osseous lesions.     There is no acute intrathoracic process. **This report has been created using voice recognition software. It may contain minor errors which are inherent in voice recognition technology.** Electronically signed by Dr Jun Maldonado    Assessment//Plan           Hospital Problems           Last Modified POA    * (Principal) Tracheobronchomalacia determined by bronchoscopy 6/5/2025 Unknown    Dyspnea on exertion 8/26/2024 Unknown    Airway clearance impairment 6/5/2025 Yes    MRSA infection 6/6/2025 Yes    History of CAD (coronary artery disease) 6/6/2025 Yes    Nonrheumatic aortic valve insufficiency 6/6/2025 Yes    Chronic heart failure with preserved

## 2025-06-07 NOTE — PLAN OF CARE
Problem: Respiratory - Adult  Goal: Achieves optimal ventilation and oxygenation  Outcome: Progressing   Remains on HFNC for head and humidity post-op tolerating well. Will wean as ordered.    Patient mutually agreed on goals.

## 2025-06-07 NOTE — PROGRESS NOTES
Hospitalist Progress Note  Internal Medicine Resident      Patient: Vladimir Muñiz Sr. 75 y.o. male      Unit/Bed: -23/023-A    Admit Date: 6/5/2025      ASSESSMENT AND PLAN  Active Problems  Tracheobronchomalacia: s/p therapeutic trial of Y stent of distal trachea and mainstem bronchi (01/06/25) and holmium laser incisions to stiffen hyperdynamic mucosa (06/05/25) by Dr. Ballard.   ENT primary. Continue high flow for humidification of healing tissues.   Pulmonary hygiene and nebulized therapy-sodium chloride nebulizer 0.9% 3mL every 4 hours, chest PT 8 hourly while awake, albuterol nebs 2.5mg BID.   On IV Unasyn for surgical prophylaxis     MRSA Rash: Wound noted on left forearm. Notes itchiness, but has improved. LMH called pre-procedure and rash positive for MRSA.   Micro results from LMH pending  Started on mupirocin 2% BID for 5 days on left forearm over rash. (06/05-06/09)    Resolved Problems    Chronic Conditions (reviewed and stable unless otherwise stated)   CAD-Follows with Dr. Sanders. Hx stent to LAD. 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   Hyperlipidemia- On Pravastatin 40mg and ezetimibe 10 mg  Hypertension- On amlodipine 5mg and lisinopril 40mg daily  Obstructive sleep apnea-On CPAP at home  HFpEF- ECHO 02/2025 showed EF 55-60%. Diastolic dysfunction present with normal LV EF. GDMT-IMDUR, lisinopril, amlodipine, Toprol   Lumbar DDD  BPH, hx urinary retention-On Flomax 0.4 mg BID   GERD-Protonix 40mg BID      LDA: []CVC / []PICC / []Midline / []Russ / []Drains / []Mediport / [x]None  Antibiotics: Unasyn and Mupirocin  Steroids: n/a  Labs (still needed?): [x]Yes / []No  IVF (still needed?): [x]Yes / []No    Level of care: [x]Step Down / []Med-Surg  Bed Status: [x]Inpatient / []Observation  Telemetry: [x]Yes / []No  PT/OT: []Yes / [x]No    DVT Prophylaxis: []

## 2025-06-07 NOTE — PLAN OF CARE
Problem: Discharge Planning  Goal: Discharge to home or other facility with appropriate resources  Outcome: Progressing  Flowsheets (Taken 6/7/2025 1825)  Discharge to home or other facility with appropriate resources:   Identify barriers to discharge with patient and caregiver   Identify discharge learning needs (meds, wound care, etc)   Refer to discharge planning if patient needs post-hospital services based on physician order or complex needs related to functional status, cognitive ability or social support system   Arrange for needed discharge resources and transportation as appropriate     Problem: Pain  Goal: Verbalizes/displays adequate comfort level or baseline comfort level  Outcome: Progressing  Flowsheets (Taken 6/7/2025 1825)  Verbalizes/displays adequate comfort level or baseline comfort level:   Encourage patient to monitor pain and request assistance   Administer analgesics based on type and severity of pain and evaluate response   Consider cultural and social influences on pain and pain management   Assess pain using appropriate pain scale   Implement non-pharmacological measures as appropriate and evaluate response   Notify Licensed Independent Practitioner if interventions unsuccessful or patient reports new pain     Problem: Safety - Adult  Goal: Free from fall injury  Outcome: Progressing  Flowsheets (Taken 6/7/2025 1825)  Free From Fall Injury: Instruct family/caregiver on patient safety     Problem: Respiratory - Adult  Goal: Lung sounds clear or within normal limits for patient  6/7/2025 0947 by Aissatou Fontana, RCP  Outcome: Progressing  Note: Txs to help improve lung aeration.Patient mutually agreed on goals.       Problem: Respiratory - Adult  Goal: Achieves optimal ventilation and oxygenation  Outcome: Progressing  Flowsheets (Taken 6/7/2025 1825)  Achieves optimal ventilation and oxygenation:   Assess for changes in respiratory status   Position to facilitate oxygenation and minimize  respiratory effort   Assess the need for suctioning and aspirate as needed   Respiratory therapy support as indicated   Assess for changes in mentation and behavior   Oxygen supplementation based on oxygen saturation or arterial blood gases   Encourage broncho-pulmonary hygiene including cough, deep breathe, incentive spirometry   Assess and instruct to report shortness of breath or any respiratory difficulty   Care plan reviewed with patient.  Patient verbalized understanding of the plan of care and contribute to goal setting.

## 2025-06-07 NOTE — PLAN OF CARE
Problem: Respiratory - Adult  Goal: Lung sounds clear or within normal limits for patient  Outcome: Progressing  Note: Txs to help improve lung aeration.Patient mutually agreed on goals.

## 2025-06-08 LAB
ANION GAP SERPL CALC-SCNC: 9 MEQ/L (ref 8–16)
BASOPHILS ABSOLUTE: 0 THOU/MM3 (ref 0–0.1)
BASOPHILS NFR BLD AUTO: 0.5 %
BUN SERPL-MCNC: 22 MG/DL (ref 8–23)
CALCIUM SERPL-MCNC: 9 MG/DL (ref 8.8–10.2)
CHLORIDE SERPL-SCNC: 108 MEQ/L (ref 98–111)
CO2 SERPL-SCNC: 23 MEQ/L (ref 22–29)
CREAT SERPL-MCNC: 1.1 MG/DL (ref 0.7–1.2)
DEPRECATED RDW RBC AUTO: 44.7 FL (ref 35–45)
EOSINOPHIL NFR BLD AUTO: 1.9 %
EOSINOPHILS ABSOLUTE: 0.2 THOU/MM3 (ref 0–0.4)
ERYTHROCYTE [DISTWIDTH] IN BLOOD BY AUTOMATED COUNT: 13.2 % (ref 11.5–14.5)
GFR SERPL CREATININE-BSD FRML MDRD: 69 ML/MIN/1.73M2
GLUCOSE SERPL-MCNC: 100 MG/DL (ref 74–109)
HCT VFR BLD AUTO: 39.6 % (ref 42–52)
HGB BLD-MCNC: 12.6 GM/DL (ref 14–18)
IMM GRANULOCYTES # BLD AUTO: 0.03 THOU/MM3 (ref 0–0.07)
IMM GRANULOCYTES NFR BLD AUTO: 0.3 %
LYMPHOCYTES ABSOLUTE: 2.6 THOU/MM3 (ref 1–4.8)
LYMPHOCYTES NFR BLD AUTO: 27.7 %
MCH RBC QN AUTO: 29.6 PG (ref 26–33)
MCHC RBC AUTO-ENTMCNC: 31.8 GM/DL (ref 32.2–35.5)
MCV RBC AUTO: 93.2 FL (ref 80–94)
MONOCYTES ABSOLUTE: 0.8 THOU/MM3 (ref 0.4–1.3)
MONOCYTES NFR BLD AUTO: 8.6 %
NEUTROPHILS ABSOLUTE: 5.7 THOU/MM3 (ref 1.8–7.7)
NEUTROPHILS NFR BLD AUTO: 61 %
NRBC BLD AUTO-RTO: 0 /100 WBC
PLATELET # BLD AUTO: 179 THOU/MM3 (ref 130–400)
PMV BLD AUTO: 11.4 FL (ref 9.4–12.4)
POTASSIUM SERPL-SCNC: 4.3 MEQ/L (ref 3.5–5.2)
RBC # BLD AUTO: 4.25 MILL/MM3 (ref 4.7–6.1)
SODIUM SERPL-SCNC: 140 MEQ/L (ref 135–145)
WBC # BLD AUTO: 9.4 THOU/MM3 (ref 4.8–10.8)

## 2025-06-08 PROCEDURE — 6370000000 HC RX 637 (ALT 250 FOR IP): Performed by: OTOLARYNGOLOGY

## 2025-06-08 PROCEDURE — 85025 COMPLETE CBC W/AUTO DIFF WBC: CPT

## 2025-06-08 PROCEDURE — 6360000002 HC RX W HCPCS: Performed by: OTOLARYNGOLOGY

## 2025-06-08 PROCEDURE — 2060000000 HC ICU INTERMEDIATE R&B

## 2025-06-08 PROCEDURE — 80048 BASIC METABOLIC PNL TOTAL CA: CPT

## 2025-06-08 PROCEDURE — 2580000003 HC RX 258: Performed by: OTOLARYNGOLOGY

## 2025-06-08 PROCEDURE — 2500000003 HC RX 250 WO HCPCS: Performed by: OTOLARYNGOLOGY

## 2025-06-08 PROCEDURE — 36415 COLL VENOUS BLD VENIPUNCTURE: CPT

## 2025-06-08 PROCEDURE — 94640 AIRWAY INHALATION TREATMENT: CPT

## 2025-06-08 PROCEDURE — 99233 SBSQ HOSP IP/OBS HIGH 50: CPT | Performed by: STUDENT IN AN ORGANIZED HEALTH CARE EDUCATION/TRAINING PROGRAM

## 2025-06-08 PROCEDURE — 94669 MECHANICAL CHEST WALL OSCILL: CPT

## 2025-06-08 PROCEDURE — 6370000000 HC RX 637 (ALT 250 FOR IP): Performed by: STUDENT IN AN ORGANIZED HEALTH CARE EDUCATION/TRAINING PROGRAM

## 2025-06-08 RX ORDER — MUPIROCIN 2 %
OINTMENT (GRAM) TOPICAL 2 TIMES DAILY
Status: DISCONTINUED | OUTPATIENT
Start: 2025-06-08 | End: 2025-06-09 | Stop reason: HOSPADM

## 2025-06-08 RX ORDER — SENNOSIDES 8.6 MG/1
1 TABLET ORAL DAILY
Status: DISCONTINUED | OUTPATIENT
Start: 2025-06-08 | End: 2025-06-09 | Stop reason: HOSPADM

## 2025-06-08 RX ADMIN — ISOSORBIDE MONONITRATE 60 MG: 60 TABLET, EXTENDED RELEASE ORAL at 10:22

## 2025-06-08 RX ADMIN — Medication: at 22:17

## 2025-06-08 RX ADMIN — Medication 3 MG: at 22:24

## 2025-06-08 RX ADMIN — ISODIUM CHLORIDE 3 ML: 0.03 SOLUTION RESPIRATORY (INHALATION) at 21:31

## 2025-06-08 RX ADMIN — TAMSULOSIN HYDROCHLORIDE 0.4 MG: 0.4 CAPSULE ORAL at 22:11

## 2025-06-08 RX ADMIN — POLYETHYLENE GLYCOL (3350) 17 G: 17 POWDER, FOR SOLUTION ORAL at 10:32

## 2025-06-08 RX ADMIN — GABAPENTIN 400 MG: 400 CAPSULE ORAL at 10:22

## 2025-06-08 RX ADMIN — ASPIRIN 81 MG: 81 TABLET, COATED ORAL at 10:22

## 2025-06-08 RX ADMIN — LISINOPRIL 40 MG: 40 TABLET ORAL at 10:22

## 2025-06-08 RX ADMIN — FAMOTIDINE 40 MG: 20 TABLET, FILM COATED ORAL at 10:22

## 2025-06-08 RX ADMIN — PANTOPRAZOLE SODIUM 40 MG: 40 TABLET, DELAYED RELEASE ORAL at 03:46

## 2025-06-08 RX ADMIN — SODIUM CHLORIDE, PRESERVATIVE FREE 10 ML: 5 INJECTION INTRAVENOUS at 10:32

## 2025-06-08 RX ADMIN — AMPICILLIN SODIUM AND SULBACTAM SODIUM 3000 MG: 2; 1 INJECTION, POWDER, FOR SOLUTION INTRAMUSCULAR; INTRAVENOUS at 06:40

## 2025-06-08 RX ADMIN — HYDROXYZINE HYDROCHLORIDE 10 MG: 10 TABLET ORAL at 22:14

## 2025-06-08 RX ADMIN — SENNOSIDES 8.6 MG: 8.6 TABLET, FILM COATED ORAL at 16:11

## 2025-06-08 RX ADMIN — PRAVASTATIN SODIUM 40 MG: 40 TABLET ORAL at 22:11

## 2025-06-08 RX ADMIN — TERBINAFINE 250 MG: 250 TABLET ORAL at 10:30

## 2025-06-08 RX ADMIN — PANTOPRAZOLE SODIUM 40 MG: 40 TABLET, DELAYED RELEASE ORAL at 16:11

## 2025-06-08 RX ADMIN — OXYBUTYNIN CHLORIDE 5 MG: 5 TABLET, EXTENDED RELEASE ORAL at 10:22

## 2025-06-08 RX ADMIN — TAMSULOSIN HYDROCHLORIDE 0.4 MG: 0.4 CAPSULE ORAL at 10:30

## 2025-06-08 RX ADMIN — SULFASALAZINE 500 MG: 500 TABLET ORAL at 10:22

## 2025-06-08 RX ADMIN — GABAPENTIN 400 MG: 400 CAPSULE ORAL at 16:11

## 2025-06-08 RX ADMIN — EZETIMIBE 10 MG: 10 TABLET ORAL at 10:22

## 2025-06-08 RX ADMIN — AMPICILLIN SODIUM AND SULBACTAM SODIUM 3000 MG: 2; 1 INJECTION, POWDER, FOR SOLUTION INTRAMUSCULAR; INTRAVENOUS at 18:30

## 2025-06-08 RX ADMIN — AMITRIPTYLINE HYDROCHLORIDE 50 MG: 50 TABLET ORAL at 22:10

## 2025-06-08 RX ADMIN — ISODIUM CHLORIDE 3 ML: 0.03 SOLUTION RESPIRATORY (INHALATION) at 15:56

## 2025-06-08 RX ADMIN — GABAPENTIN 400 MG: 400 CAPSULE ORAL at 22:11

## 2025-06-08 RX ADMIN — ISODIUM CHLORIDE 3 ML: 0.03 SOLUTION RESPIRATORY (INHALATION) at 07:56

## 2025-06-08 RX ADMIN — METOPROLOL SUCCINATE 25 MG: 25 TABLET, EXTENDED RELEASE ORAL at 10:22

## 2025-06-08 RX ADMIN — AMLODIPINE BESYLATE 5 MG: 5 TABLET ORAL at 10:22

## 2025-06-08 RX ADMIN — AMPICILLIN SODIUM AND SULBACTAM SODIUM 3000 MG: 2; 1 INJECTION, POWDER, FOR SOLUTION INTRAMUSCULAR; INTRAVENOUS at 12:45

## 2025-06-08 RX ADMIN — AMPICILLIN SODIUM AND SULBACTAM SODIUM 3000 MG: 2; 1 INJECTION, POWDER, FOR SOLUTION INTRAMUSCULAR; INTRAVENOUS at 00:20

## 2025-06-08 NOTE — PROGRESS NOTES
Hospitalist Progress Note  Internal Medicine Resident      Patient: Vladimir Muñiz Sr. 75 y.o. male      Unit/Bed: -23/023-A    Admit Date: 6/5/2025      ASSESSMENT AND PLAN  Active Problems  Tracheobronchomalacia: s/p therapeutic trial of Y stent of distal trachea and mainstem bronchi (01/06/25) and holmium laser incisions to stiffen hyperdynamic mucosa (06/05/25) by Dr. Ballard.   ENT primary. Off high flow.   Pulmonary hygiene and nebulized therapy-sodium chloride nebulizer 0.9% 3mL every 4 hours, chest PT 8 hourly while awake, albuterol nebs 2.5mg BID.   On IV Unasyn for surgical prophylaxis     MRSA Rash: Wound noted on left forearm. Notes itchiness, but has improved. LMH called pre-procedure and rash positive for MRSA.   On mupirocin 2% BID for 5 days on left forearm over rash. (06/05-06/09)    Resolved Problems    Chronic Conditions (reviewed and stable unless otherwise stated)   CAD-Follows with Dr. Sanders. Hx stent to LAD. 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   Hyperlipidemia- On Pravastatin 40mg and ezetimibe 10 mg  Hypertension- On amlodipine 5mg and lisinopril 40mg daily  Obstructive sleep apnea-On CPAP at home  HFpEF- ECHO 02/2025 showed EF 55-60%. Diastolic dysfunction present with normal LV EF. GDMT-IMDUR, lisinopril, amlodipine, Toprol   Lumbar DDD  BPH, hx urinary retention-On Flomax 0.4 mg BID   GERD-Protonix 40mg BID      LDA: []CVC / []PICC / []Midline / []Russ / []Drains / []Mediport / [x]None  Antibiotics: Unasyn and Mupirocin  Steroids: n/a  Labs (still needed?): [x]Yes / []No  IVF (still needed?): [x]Yes / []No    Level of care: [x]Step Down / []Med-Surg  Bed Status: [x]Inpatient / []Observation  Telemetry: [x]Yes / []No  PT/OT: []Yes / [x]No    DVT Prophylaxis: [] Lovenox / [] Heparin / [x] SCDs / [] Already on Systemic Anticoagulation / [] None      Expected discharge date:  48 hours  Disposition: Home     Code status: Full Code     ===================================================================    Chief Complaint: planned holmium laser incisions to stiffen hyperdynamic mucosa   Subjective (past 24 hours): No acute events overnight. Off high flow. Continued on Unasyn per primary. Per primary concerns patient will not be able to use nebs on his own at home. Denies chest pain, SOB, abdominal pain, edema, no nausea/vomiting.     HPI / Hospital Course:  Vladimir Muñiz Sr. is a 75 y.o. male with PMHx of CAD, tracheomalacia, aortic valve insufficiency, hyperlipidemia, hypertension, ROHIT, HFpEF, BPH, GERD, lumbar DDD who presents to [facility] for holmium laser incisions to stiffen hyperdynamic mucosa (06/05/25) by Dr. Ballard.  Has a history of worsening shortness of breath found to have tracheomalacia had a therapeutic trial of Y stent of the distal trachea and main bronchi in January 2025. Patient notes rash on forearm that is positive for MRSA. Patient notes itchiness on forearm where rash is located. Started on Mupirocin 2% ointment BID for 5 days. Post-procedure notes fatigue, no SOB. On high flow for moisture and chest physiotherapy.     Medications:    Infusion Medications    sodium chloride      lactated ringers 50 mL/hr at 06/06/25 1058    Scheduled Medications    mupirocin   Topical BID    sodium chloride flush  5-40 mL IntraVENous 2 times per day    polyethylene glycol  17 g Oral Daily    sodium chloride nebulizer  3 mL Nebulization Q4H    amitriptyline  50 mg Oral Nightly    amLODIPine  5 mg Oral Daily    aspirin  81 mg Oral Daily    lisinopril  40 mg Oral Daily    ezetimibe  10 mg Oral Daily    famotidine  40 mg Oral Daily    gabapentin  400 mg Oral TID    hydrOXYzine HCl  10 mg Oral Nightly    isosorbide mononitrate  60 mg Oral QAM    melatonin  3 mg Oral Daily    metoprolol succinate  25 mg Oral Daily

## 2025-06-08 NOTE — PLAN OF CARE
Problem: Respiratory - Adult  Goal: Lung sounds clear or within normal limits for patient  6/7/2025 2119 by Mary Jo Pelayo, RCP  Outcome: Progressing    Patient lung sounds are considered normal for their current lung condition. No signs of distress noted. Current treatment regimen appropriate      Patient mutually agreed on goals.

## 2025-06-08 NOTE — PROGRESS NOTES
Progress Note  Date:2025       Room[facility]023-A  Patient Name:Vladimir Muñiz Sr.     YOB: 1950     Age:75 y.o.    The patient reports breathing much more comfortably today than yesterday.  He has not yet been walking and is being seen at midday.  He is in bed about to eat lunch.    Subjective    Subjective   As above  Review of Systems  As above  Objective         Vitals Last 24 Hours:  TEMPERATURE:  Temp  Av.8 °F (36.6 °C)  Min: 97.5 °F (36.4 °C)  Max: 98 °F (36.7 °C)  RESPIRATIONS RANGE: Resp  Av  Min: 16  Max: 18  PULSE OXIMETRY RANGE: SpO2  Av.3 %  Min: 94 %  Max: 99 %  PULSE RANGE: Pulse  Av.2  Min: 61  Max: 75  BLOOD PRESSURE RANGE: Systolic (24hrs), Av , Min:111 , Max:143   ; Diastolic (24hrs), Av, Min:56, Max:73    I/O (24Hr):    Intake/Output Summary (Last 24 hours) at 2025 1605  Last data filed at 2025 0343  Gross per 24 hour   Intake 938 ml   Output 775 ml   Net 163 ml     Objective    On general physical exam the patient is a pleasant alert cooperative older adult male in no acute distress.  His voice and speech pattern are within normal limits for his age and gender.  I heard no throat clearing coughing or inspiratory stridor.  On auscultation the patient was able to have tidal airflow through his right lower lobe much more readily than he could yesterday.  He still had some rhonchorous sounds but on 2 or 3 deep breaths he could clear them completely.  No other rales rhonchi or wheezing was heard throughout any other part of his lungs.    Labs/Imaging/Diagnostics    Labs:  CBC:  Recent Labs     25  0520 25  0544 25  0516   WBC 8.6 9.1 9.4   RBC 4.04* 4.29* 4.25*   HGB 11.9* 12.7* 12.6*   HCT 37.1* 39.8* 39.6*   MCV 91.8 92.8 93.2    163 179     CHEMISTRIES:  Recent Labs     25  0520 25  0544 25  0516    143 140   K 4.5 4.8 4.3    107 108   CO2 23 27 23   BUN 18 18 22   CREATININE 0.9 1.1 1.1

## 2025-06-09 VITALS
OXYGEN SATURATION: 97 % | BODY MASS INDEX: 24.89 KG/M2 | TEMPERATURE: 97.2 F | SYSTOLIC BLOOD PRESSURE: 138 MMHG | HEART RATE: 58 BPM | RESPIRATION RATE: 17 BRPM | DIASTOLIC BLOOD PRESSURE: 64 MMHG | HEIGHT: 68 IN | WEIGHT: 164.25 LBS

## 2025-06-09 LAB
ANION GAP SERPL CALC-SCNC: 9 MEQ/L (ref 8–16)
BASOPHILS ABSOLUTE: 0 THOU/MM3 (ref 0–0.1)
BASOPHILS NFR BLD AUTO: 0.4 %
BUN SERPL-MCNC: 23 MG/DL (ref 8–23)
CALCIUM SERPL-MCNC: 9 MG/DL (ref 8.8–10.2)
CHLORIDE SERPL-SCNC: 109 MEQ/L (ref 98–111)
CO2 SERPL-SCNC: 23 MEQ/L (ref 22–29)
CREAT SERPL-MCNC: 1.1 MG/DL (ref 0.7–1.2)
DEPRECATED RDW RBC AUTO: 44.5 FL (ref 35–45)
EOSINOPHIL NFR BLD AUTO: 2.5 %
EOSINOPHILS ABSOLUTE: 0.2 THOU/MM3 (ref 0–0.4)
ERYTHROCYTE [DISTWIDTH] IN BLOOD BY AUTOMATED COUNT: 13.1 % (ref 11.5–14.5)
GFR SERPL CREATININE-BSD FRML MDRD: 69 ML/MIN/1.73M2
GLUCOSE BLD STRIP.AUTO-MCNC: 87 MG/DL (ref 70–108)
GLUCOSE SERPL-MCNC: 109 MG/DL (ref 74–109)
HCT VFR BLD AUTO: 38.3 % (ref 42–52)
HGB BLD-MCNC: 12.2 GM/DL (ref 14–18)
IMM GRANULOCYTES # BLD AUTO: 0.02 THOU/MM3 (ref 0–0.07)
IMM GRANULOCYTES NFR BLD AUTO: 0.2 %
LYMPHOCYTES ABSOLUTE: 2.2 THOU/MM3 (ref 1–4.8)
LYMPHOCYTES NFR BLD AUTO: 23.7 %
MCH RBC QN AUTO: 29.7 PG (ref 26–33)
MCHC RBC AUTO-ENTMCNC: 31.9 GM/DL (ref 32.2–35.5)
MCV RBC AUTO: 93.2 FL (ref 80–94)
MONOCYTES ABSOLUTE: 0.8 THOU/MM3 (ref 0.4–1.3)
MONOCYTES NFR BLD AUTO: 9.1 %
NEUTROPHILS ABSOLUTE: 5.9 THOU/MM3 (ref 1.8–7.7)
NEUTROPHILS NFR BLD AUTO: 64.1 %
NRBC BLD AUTO-RTO: 0 /100 WBC
PLATELET # BLD AUTO: 174 THOU/MM3 (ref 130–400)
PMV BLD AUTO: 11.1 FL (ref 9.4–12.4)
POTASSIUM SERPL-SCNC: 4.6 MEQ/L (ref 3.5–5.2)
RBC # BLD AUTO: 4.11 MILL/MM3 (ref 4.7–6.1)
SODIUM SERPL-SCNC: 141 MEQ/L (ref 135–145)
WBC # BLD AUTO: 9.2 THOU/MM3 (ref 4.8–10.8)

## 2025-06-09 PROCEDURE — 2580000003 HC RX 258: Performed by: OTOLARYNGOLOGY

## 2025-06-09 PROCEDURE — 6370000000 HC RX 637 (ALT 250 FOR IP): Performed by: OTOLARYNGOLOGY

## 2025-06-09 PROCEDURE — 99233 SBSQ HOSP IP/OBS HIGH 50: CPT | Performed by: STUDENT IN AN ORGANIZED HEALTH CARE EDUCATION/TRAINING PROGRAM

## 2025-06-09 PROCEDURE — 80048 BASIC METABOLIC PNL TOTAL CA: CPT

## 2025-06-09 PROCEDURE — 6360000002 HC RX W HCPCS: Performed by: OTOLARYNGOLOGY

## 2025-06-09 PROCEDURE — 94669 MECHANICAL CHEST WALL OSCILL: CPT

## 2025-06-09 PROCEDURE — 82948 REAGENT STRIP/BLOOD GLUCOSE: CPT

## 2025-06-09 PROCEDURE — 36415 COLL VENOUS BLD VENIPUNCTURE: CPT

## 2025-06-09 PROCEDURE — 94640 AIRWAY INHALATION TREATMENT: CPT

## 2025-06-09 PROCEDURE — 85025 COMPLETE CBC W/AUTO DIFF WBC: CPT

## 2025-06-09 PROCEDURE — 6370000000 HC RX 637 (ALT 250 FOR IP): Performed by: STUDENT IN AN ORGANIZED HEALTH CARE EDUCATION/TRAINING PROGRAM

## 2025-06-09 PROCEDURE — 94761 N-INVAS EAR/PLS OXIMETRY MLT: CPT

## 2025-06-09 RX ORDER — SODIUM CHLORIDE FOR INHALATION 3 %
4 VIAL, NEBULIZER (ML) INHALATION PRN
Qty: 15 ML | Refills: 2 | Status: SHIPPED | OUTPATIENT
Start: 2025-06-09

## 2025-06-09 RX ORDER — SODIUM CHLORIDE FOR INHALATION 0.9 %
3 VIAL, NEBULIZER (ML) INHALATION EVERY 4 HOURS
Qty: 540 ML | Refills: 1 | Status: SHIPPED | OUTPATIENT
Start: 2025-06-09 | End: 2025-08-08

## 2025-06-09 RX ORDER — MUPIROCIN 2 %
OINTMENT (GRAM) TOPICAL
Qty: 1 G | Refills: 0 | Status: SHIPPED | OUTPATIENT
Start: 2025-06-09

## 2025-06-09 RX ORDER — SENNOSIDES 8.6 MG/1
1 TABLET ORAL DAILY
Qty: 30 TABLET | Refills: 0 | Status: SHIPPED | OUTPATIENT
Start: 2025-06-10 | End: 2025-07-10

## 2025-06-09 RX ORDER — SULFAMETHOXAZOLE AND TRIMETHOPRIM 800; 160 MG/1; MG/1
1 TABLET ORAL 2 TIMES DAILY
Qty: 20 TABLET | Refills: 0 | Status: SHIPPED | OUTPATIENT
Start: 2025-06-09 | End: 2025-06-19

## 2025-06-09 RX ORDER — POLYETHYLENE GLYCOL 3350 17 G/17G
17 POWDER, FOR SOLUTION ORAL DAILY
Qty: 30 PACKET | Refills: 0 | Status: SHIPPED | OUTPATIENT
Start: 2025-06-10 | End: 2025-07-10

## 2025-06-09 RX ORDER — ALBUTEROL SULFATE 0.83 MG/ML
2.5 SOLUTION RESPIRATORY (INHALATION) EVERY 4 HOURS PRN
Qty: 120 EACH | Refills: 3 | Status: SHIPPED | OUTPATIENT
Start: 2025-06-09

## 2025-06-09 RX ADMIN — AMLODIPINE BESYLATE 5 MG: 5 TABLET ORAL at 10:06

## 2025-06-09 RX ADMIN — GABAPENTIN 400 MG: 400 CAPSULE ORAL at 10:06

## 2025-06-09 RX ADMIN — TAMSULOSIN HYDROCHLORIDE 0.4 MG: 0.4 CAPSULE ORAL at 10:07

## 2025-06-09 RX ADMIN — Medication 4 ML: at 08:22

## 2025-06-09 RX ADMIN — Medication: at 10:05

## 2025-06-09 RX ADMIN — ISODIUM CHLORIDE 3 ML: 0.03 SOLUTION RESPIRATORY (INHALATION) at 13:27

## 2025-06-09 RX ADMIN — METOPROLOL SUCCINATE 25 MG: 25 TABLET, EXTENDED RELEASE ORAL at 10:06

## 2025-06-09 RX ADMIN — FAMOTIDINE 40 MG: 20 TABLET, FILM COATED ORAL at 10:07

## 2025-06-09 RX ADMIN — OXYBUTYNIN CHLORIDE 5 MG: 5 TABLET, EXTENDED RELEASE ORAL at 10:06

## 2025-06-09 RX ADMIN — SULFASALAZINE 500 MG: 500 TABLET ORAL at 10:06

## 2025-06-09 RX ADMIN — SENNOSIDES 8.6 MG: 8.6 TABLET, FILM COATED ORAL at 10:07

## 2025-06-09 RX ADMIN — POLYETHYLENE GLYCOL (3350) 17 G: 17 POWDER, FOR SOLUTION ORAL at 10:07

## 2025-06-09 RX ADMIN — AMPICILLIN SODIUM AND SULBACTAM SODIUM 3000 MG: 2; 1 INJECTION, POWDER, FOR SOLUTION INTRAMUSCULAR; INTRAVENOUS at 01:21

## 2025-06-09 RX ADMIN — EZETIMIBE 10 MG: 10 TABLET ORAL at 10:06

## 2025-06-09 RX ADMIN — ASPIRIN 81 MG: 81 TABLET, COATED ORAL at 10:07

## 2025-06-09 NOTE — PROGRESS NOTES
Hospitalist Progress Note  Internal Medicine Resident      Patient: Vladimir Muñiz Sr. 75 y.o. male      Unit/Bed: -23/023-A    Admit Date: 6/5/2025      ASSESSMENT AND PLAN  Active Problems  Tracheobronchomalacia: s/p therapeutic trial of Y stent of distal trachea and mainstem bronchi (01/06/25) and holmium laser incisions to stiffen hyperdynamic mucosa (06/05/25) by Dr. Ballard.   ENT primary. Off high flow.   Pulmonary hygiene and nebulized therapy-sodium chloride nebulizer 0.9% 3mL every 4 hours, chest PT 8 hourly while awake, albuterol nebs 2.5mg BID.   Continue IV Unasyn for surgical prophylaxis   Patient discharged by ENT 6/9  MRSA Rash: Wound noted on left forearm. Notes itchiness, but has improved. LMH called pre-procedure and rash positive for MRSA.   Continue mupirocin 2% BID for 5 days on left forearm over rash. (06/05-06/09)    Resolved Problems    Chronic Conditions (reviewed and stable unless otherwise stated)   CAD-Follows with Dr. Sanders. Hx stent to LAD. 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   Hyperlipidemia- On Pravastatin 40mg and ezetimibe 10 mg  Hypertension- On amlodipine 5mg and lisinopril 40mg daily  Obstructive sleep apnea-On CPAP at home  HFpEF- ECHO 02/2025 showed EF 55-60%. Diastolic dysfunction present with normal LV EF. GDMT-IMDUR, lisinopril, amlodipine, Toprol   Lumbar DDD  BPH, hx urinary retention-On Flomax 0.4 mg BID   GERD-Protonix 40mg BID      LDA: []CVC / []PICC / []Midline / []Russ / []Drains / []Mediport / [x]None  Antibiotics: Unasyn and Mupirocin  Steroids: n/a  Labs (still needed?): [x]Yes / []No  IVF (still needed?): [x]Yes / []No    Level of care: [x]Step Down / []Med-Surg  Bed Status: [x]Inpatient / []Observation  Telemetry: [x]Yes / []No  PT/OT: []Yes / [x]No    DVT Prophylaxis: [] Lovenox / [] Heparin / [x] SCDs / [] Already on

## 2025-06-09 NOTE — PLAN OF CARE
Problem: Discharge Planning  Goal: Discharge to home or other facility with appropriate resources  6/9/2025 1445 by Keagan Jalloh RN  Outcome: Progressing     Problem: Pain  Goal: Verbalizes/displays adequate comfort level or baseline comfort level  6/9/2025 1445 by Keagan Jalloh RN  Outcome: Adequate for Discharge     Problem: Safety - Adult  Goal: Free from fall injury  6/9/2025 1445 by Keagan Jalloh RN  Outcome: Adequate for Discharge     Problem: Respiratory - Adult  Goal: Achieves optimal ventilation and oxygenation  6/9/2025 1445 by Keagan Jalloh RN  Outcome: Adequate for Discharge

## 2025-06-09 NOTE — DISCHARGE SUMMARY
DISCHARGE SUMMARY    Pt Name: Vladimir Muñiz Sr.  MRN: [id number]  YOB: 1950  Primary Care Physician: Eric Ramírez MD    Admit date:  6/5/2025  9:43 AM  Discharge date:  6/9/2025  3:00 PM  Disposition: Home  Admitting Diagnosis: No diagnosis found.  Discharge Diagnosis:   Patient Active Problem List   Diagnosis Code    Hypertension I10    Hyperlipidemia E78.5    Ganglion cyst M67.40    Restless leg syndrome G25.81    Osteoarthritis M19.90    CAD (coronary artery disease) I25.10    Rectal bleeding K62.5    Constipation K59.00    DDD (degenerative disc disease), lumbar M51.369    Chronic lumbar radiculopathy M54.16    BPPV (benign paroxysmal positional vertigo) H81.10    Neuropathic pain of foot M79.2    Headache R51.9    GERD (gastroesophageal reflux disease) K21.9    Vertigo R42    Abdominal pain R10.9    Major depressive disorder, recurrent, severe without psychotic features (HCC) F33.2    Dyspnea on exertion R06.09    Tracheobronchomalacia determined by bronchoscopy J39.8    Tracheobronchomalacia J39.8    Urinary retention R33.9    Moderate malnutrition E44.0    Pneumonia of both lower lobes due to infectious organism J18.9    Dry cough R05.8    High anion gap metabolic acidosis E87.29    Severe depression (HCC) F32.2    ROHIT (obstructive sleep apnea) G47.33    Anxiety F41.9    Neurogenic bladder N31.9    Benign prostatic hyperplasia without lower urinary tract symptoms N40.0    Severe recurrent major depression without psychotic features (HCC) F33.2    Chest pain R07.9    Severe malnutrition E43    Airway clearance impairment R06.89    MRSA infection A49.02    History of CAD (coronary artery disease) Z86.79    Nonrheumatic aortic valve insufficiency I35.1    Chronic heart failure with preserved ejection fraction (HFpEF) (HCC) I50.32     Consultants:  Hospitalist  Procedures/Diagnostic Test:  Tracheobronchoplasty, BRONCHOSCOPY with Alveolar Lavage     Hospital Course: Vladimir ventura  capsule  Commonly known as: FLOMAX  Take 1 capsule by mouth twice daily     terbinafine 250 MG tablet  Commonly known as: LAMISIL            STOP taking these medications      acetylcysteine 20 % nebulizer solution  Commonly known as: MUCOMYST     calcium carbonate 600 MG Tabs tablet     doxycycline hyclate 100 MG capsule  Commonly known as: VIBRAMYCIN     sodium chloride (Inhalant) 3 % nebulizer solution  Replaced by: sodium chloride nebulizer 0.9 % solution               Where to Get Your Medications        These medications were sent to [facility] [address] - P [phone] - F [phone]  [address] [address]      Phone: [phone]   albuterol (2.5 MG/3ML) 0.083% nebulizer solution  mupirocin 2 % ointment  polyethylene glycol 17 g packet  senna 8.6 MG tablet  sodium chloride (Inhalant) 3 % nebulizer solution  sodium chloride nebulizer 0.9 % solution  sulfamethoxazole-trimethoprim 800-160 MG per tablet         See AVS for specific instructions    Electronically signed by Mraine Murcia PA-C on 6/9/2025 at 8:36 PM

## 2025-06-09 NOTE — PROGRESS NOTES
Patient did his vest with RRT at 0900 this am vest is ordered for Q8. Patient stated that the vest hurt as he was getting it and then told RT at 1200 tx that he was sore for a while after the vest tx, RT gave the patient an acacupella with instruction, unable to perfectserve the Otolaryngology team note made for janette

## 2025-06-09 NOTE — CARE COORDINATION
6/9/25, 12:17 PM EDT    Patient goals/plan/ treatment preferences discussed by  and .  Patient goals/plan/ treatment preferences reviewed with patient/ family.  Patient/ family verbalize understanding of discharge plan and are in agreement with goal/plan/treatment preferences.  Understanding was demonstrated using the teach back method.  AVS provided by RN at time of discharge, which includes all necessary medical information pertaining to the patients current course of illness, treatment, post-discharge goals of care, and treatment preferences.     Services At/After Discharge: None   plans home w mentally handicapped daughter Mary (like 8-10 year old mentation, attends [employer] 8 am - 3 pm) when medically cleared (IV Unasyn, SR HME CPAP, cane, walker, electric WC, nebulizer (filled in [employer]); still drives; [place]

## 2025-06-23 ENCOUNTER — OFFICE VISIT (OUTPATIENT)
Dept: ENT CLINIC | Age: 75
End: 2025-06-23

## 2025-06-23 VITALS
TEMPERATURE: 97.7 F | HEIGHT: 68 IN | WEIGHT: 165 LBS | DIASTOLIC BLOOD PRESSURE: 64 MMHG | OXYGEN SATURATION: 94 % | SYSTOLIC BLOOD PRESSURE: 126 MMHG | HEART RATE: 83 BPM | BODY MASS INDEX: 25.01 KG/M2 | RESPIRATION RATE: 20 BRPM

## 2025-06-23 DIAGNOSIS — R06.09 DYSPNEA ON EXERTION: ICD-10-CM

## 2025-06-23 DIAGNOSIS — J39.8 TRACHEOBRONCHOMALACIA DETERMINED BY BRONCHOSCOPY: Primary | ICD-10-CM

## 2025-06-23 DIAGNOSIS — J41.8 MIXED SIMPLE AND MUCOPURULENT CHRONIC BRONCHITIS (HCC): ICD-10-CM

## 2025-06-25 LAB
BACTERIA SPEC RESP CULT: NORMAL
GRAM STN SPEC: NORMAL

## 2025-06-25 NOTE — PROGRESS NOTES
CREATININE 1.1 06/08/2025 05:16 AM    CREATININE 1.1 06/07/2025 05:44 AM    CALCIUM 9.0 06/09/2025 06:45 AM    CALCIUM 9.0 06/08/2025 05:16 AM    CALCIUM 9.0 06/07/2025 05:44 AM    BILITOT 0.3 05/27/2025 09:47 AM    BILITOT 0.7 02/23/2025 07:13 PM    BILITOT 0.4 02/15/2025 11:40 PM    ALKPHOS 71 05/27/2025 09:47 AM    ALKPHOS 63 02/23/2025 07:13 PM    ALKPHOS 72 02/15/2025 11:40 PM    AST 22 05/27/2025 09:47 AM    AST 15 02/23/2025 07:13 PM    AST 12 02/15/2025 11:40 PM    ALT 14 05/27/2025 09:47 AM    ALT 11 02/23/2025 07:13 PM    ALT 8 02/15/2025 11:40 PM       All of the past medical history, past surgical history, family history,social history, allergies and current medications were reviewed with the patient.  Assessment & Plan   Assessment & Plan   Diagnoses and all orders for this visit:     Diagnosis Orders   1. Tracheobronchomalacia determined by bronchoscopy        2. Dyspnea on exertion        3. Mixed simple and mucopurulent chronic bronchitis (HCC)            The findings were explained and his questions were answered.     Assessment & Plan  1. Postoperative status following stage 1 laser tracheobronchoplasty.  Significant increase in vigor reported, with the ability to walk longer distances and remain active for extended periods. No apparent shortness of breath and no coughing during the visit. Persistent cough is likely due to the healing process from the laser scuffing of the back wall. A bronchoalveolar lavage (BAL) will be performed using a Luken's tube. Risks, benefits, and alternatives of the BAL procedure were discussed, including the potential for discomfort, bleeding, infection, and the necessity for accurate diagnosis and monitoring of lung health.  I recommended he continue with his pulmonary toilet process of nebulizing his airway to mobilize his secretions.    I also recommended we proceed with the scheduling of his next stage of his tracheobronchoplasty procedure sometime in the next  - - -

## 2025-06-27 ENCOUNTER — PREP FOR PROCEDURE (OUTPATIENT)
Dept: ENT CLINIC | Age: 75
End: 2025-06-27

## 2025-06-27 DIAGNOSIS — J41.8 MIXED SIMPLE AND MUCOPURULENT CHRONIC BRONCHITIS (HCC): ICD-10-CM

## 2025-07-21 ENCOUNTER — HOSPITAL ENCOUNTER (OUTPATIENT)
Age: 75
Discharge: HOME | End: 2025-07-21
Payer: MEDICARE

## 2025-07-21 ENCOUNTER — TELEPHONE (OUTPATIENT)
Dept: UROLOGY | Age: 75
End: 2025-07-21

## 2025-07-21 DIAGNOSIS — R30.0 DYSURIA: Primary | ICD-10-CM

## 2025-07-21 DIAGNOSIS — R30.0 DYSURIA: ICD-10-CM

## 2025-07-21 LAB
BACTERIA: ABNORMAL
BILIRUB UR QL STRIP: ABNORMAL
CASTS #/AREA URNS LPF: ABNORMAL /LPF
CASTS #/AREA URNS LPF: ABNORMAL /LPF
CHARACTER UR: CLEAR
CHARCOAL URNS QL MICRO: ABNORMAL
COLOR UR: ABNORMAL
CRYSTALS URNS QL MICRO: ABNORMAL
EPITHELIAL CELLS, UA: ABNORMAL /HPF
GLUCOSE UR QL STRIP.AUTO: NEGATIVE MG/DL
HGB UR QL STRIP.AUTO: ABNORMAL
KETONES UR QL STRIP.AUTO: ABNORMAL
LEUKOCYTE ESTERASE UR QL STRIP.AUTO: ABNORMAL
MUCOUS THREADS URNS QL MICRO: ABNORMAL
NITRITE UR QL STRIP.AUTO: NEGATIVE
PH UR STRIP.AUTO: 6 [PH] (ref 5–9)
PROT UR STRIP.AUTO-MCNC: >= 300 MG/DL
RBC #/AREA URNS HPF: > 200 /HPF
RENAL EPI CELLS #/AREA URNS HPF: ABNORMAL /[HPF]
SPECIFIC GRAVITY UA: 1.03 (ref 1–1.03)
UROBILINOGEN, URINE: 1 EU/DL (ref 0–1)
WBC #/AREA URNS HPF: ABNORMAL /HPF
YEAST LIKE FUNGI URNS QL MICRO: ABNORMAL

## 2025-07-21 PROCEDURE — 81001 URINALYSIS AUTO W/SCOPE: CPT

## 2025-07-21 PROCEDURE — 87086 URINE CULTURE/COLONY COUNT: CPT

## 2025-07-21 NOTE — TELEPHONE ENCOUNTER
Patient c/o burning, urgency, and frequency.    Orders placed for urine. C/o the burning is very bad. Advised may use azo while waiting on the results.

## 2025-07-22 LAB
BACTERIA UR CULT: ABNORMAL
ORGANISM: ABNORMAL

## 2025-07-25 ENCOUNTER — LAB (OUTPATIENT)
Dept: UROLOGY | Age: 75
End: 2025-07-25

## 2025-07-25 DIAGNOSIS — R30.0 DYSURIA: ICD-10-CM

## 2025-07-25 DIAGNOSIS — R35.0 FREQUENCY OF URINATION: Primary | ICD-10-CM

## 2025-07-25 NOTE — PROGRESS NOTES
Patient is coming in for guidance test  with diagnosis of dysuria and frequency. Urine sample collected by patient and sent out to lab via [employer].

## 2025-08-04 ENCOUNTER — OFFICE VISIT (OUTPATIENT)
Dept: UROLOGY | Age: 75
End: 2025-08-04
Payer: MEDICARE

## 2025-08-04 VITALS — WEIGHT: 165 LBS | RESPIRATION RATE: 20 BRPM | BODY MASS INDEX: 25.01 KG/M2 | HEIGHT: 68 IN

## 2025-08-04 DIAGNOSIS — R33.9 URINARY RETENTION: Primary | ICD-10-CM

## 2025-08-04 DIAGNOSIS — N30.00 ACUTE CYSTITIS WITHOUT HEMATURIA: ICD-10-CM

## 2025-08-04 DIAGNOSIS — N40.1 BENIGN PROSTATIC HYPERPLASIA WITH LOWER URINARY TRACT SYMPTOMS, SYMPTOM DETAILS UNSPECIFIED: ICD-10-CM

## 2025-08-04 PROCEDURE — 1036F TOBACCO NON-USER: CPT | Performed by: NURSE PRACTITIONER

## 2025-08-04 PROCEDURE — G8419 CALC BMI OUT NRM PARAM NOF/U: HCPCS | Performed by: NURSE PRACTITIONER

## 2025-08-04 PROCEDURE — 99214 OFFICE O/P EST MOD 30 MIN: CPT | Performed by: NURSE PRACTITIONER

## 2025-08-04 PROCEDURE — G8427 DOCREV CUR MEDS BY ELIG CLIN: HCPCS | Performed by: NURSE PRACTITIONER

## 2025-08-04 PROCEDURE — 1123F ACP DISCUSS/DSCN MKR DOCD: CPT | Performed by: NURSE PRACTITIONER

## 2025-08-04 ASSESSMENT — ENCOUNTER SYMPTOMS
SHORTNESS OF BREATH: 0
COUGH: 0
ABDOMINAL PAIN: 0

## 2025-08-12 ENCOUNTER — TELEPHONE (OUTPATIENT)
Dept: UROLOGY | Age: 75
End: 2025-08-12

## 2025-08-15 ENCOUNTER — TELEPHONE (OUTPATIENT)
Dept: ENT CLINIC | Age: 75
End: 2025-08-15

## 2025-09-01 ENCOUNTER — HOSPITAL ENCOUNTER (EMERGENCY)
Age: 75
Discharge: HOME | End: 2025-09-01
Attending: EMERGENCY MEDICINE
Payer: MEDICARE

## 2025-09-01 VITALS
SYSTOLIC BLOOD PRESSURE: 148 MMHG | HEART RATE: 88 BPM | OXYGEN SATURATION: 95 % | TEMPERATURE: 98.6 F | WEIGHT: 165 LBS | DIASTOLIC BLOOD PRESSURE: 71 MMHG | RESPIRATION RATE: 16 BRPM | BODY MASS INDEX: 25.01 KG/M2 | HEIGHT: 68 IN

## 2025-09-01 DIAGNOSIS — N39.0 URINARY TRACT INFECTION WITH HEMATURIA, SITE UNSPECIFIED: Primary | ICD-10-CM

## 2025-09-01 DIAGNOSIS — R97.20 ELEVATED PSA: ICD-10-CM

## 2025-09-01 DIAGNOSIS — R31.9 URINARY TRACT INFECTION WITH HEMATURIA, SITE UNSPECIFIED: Primary | ICD-10-CM

## 2025-09-01 LAB
ALBUMIN SERPL BCG-MCNC: 3.9 G/DL (ref 3.4–4.9)
ALP SERPL-CCNC: 72 U/L (ref 40–129)
ALT SERPL W/O P-5'-P-CCNC: 11 U/L (ref 10–50)
ANION GAP SERPL CALC-SCNC: 9 MEQ/L (ref 8–16)
AST SERPL-CCNC: 20 U/L (ref 10–50)
BACTERIA URNS QL MICRO: ABNORMAL /HPF
BASOPHILS ABSOLUTE: 0 THOU/MM3 (ref 0–0.1)
BASOPHILS NFR BLD AUTO: 0.4 %
BILIRUB SERPL-MCNC: 0.2 MG/DL (ref 0.3–1.2)
BILIRUB UR QL STRIP.AUTO: NEGATIVE
BUN SERPL-MCNC: 20 MG/DL (ref 8–23)
CALCIUM SERPL-MCNC: 9.1 MG/DL (ref 8.5–10.5)
CASTS #/AREA URNS LPF: ABNORMAL /LPF
CASTS 2: ABNORMAL /LPF
CHARACTER UR: ABNORMAL
CHLORIDE SERPL-SCNC: 109 MEQ/L (ref 98–111)
CO2 SERPL-SCNC: 25 MEQ/L (ref 22–29)
COLOR, UA: ABNORMAL
CREAT SERPL-MCNC: 1.2 MG/DL (ref 0.7–1.2)
CRYSTALS URNS MICRO: ABNORMAL
DEPRECATED RDW RBC AUTO: 48.2 FL (ref 35–45)
EOSINOPHIL NFR BLD AUTO: 1 %
EOSINOPHILS ABSOLUTE: 0.1 THOU/MM3 (ref 0–0.4)
EPITHELIAL CELLS, UA: ABNORMAL /HPF
ERYTHROCYTE [DISTWIDTH] IN BLOOD BY AUTOMATED COUNT: 13.7 % (ref 11.5–14.5)
GFR SERPL CREATININE-BSD FRML MDRD: 62 ML/MIN/1.73M2
GLUCOSE SERPL-MCNC: 113 MG/DL (ref 74–109)
GLUCOSE UR QL STRIP.AUTO: NEGATIVE MG/DL
HCT VFR BLD AUTO: 39.5 % (ref 42–52)
HGB BLD-MCNC: 12.4 GM/DL (ref 14–18)
HGB UR QL STRIP.AUTO: ABNORMAL
IMM GRANULOCYTES # BLD AUTO: 0.03 THOU/MM3 (ref 0–0.07)
IMM GRANULOCYTES NFR BLD AUTO: 0.3 %
KETONES UR QL STRIP.AUTO: ABNORMAL
LYMPHOCYTES ABSOLUTE: 1.3 THOU/MM3 (ref 1–4.8)
LYMPHOCYTES NFR BLD AUTO: 13.7 %
MCH RBC QN AUTO: 29.9 PG (ref 26–33)
MCHC RBC AUTO-ENTMCNC: 31.4 GM/DL (ref 32.2–35.5)
MCV RBC AUTO: 95.2 FL (ref 80–94)
MISCELLANEOUS 2: ABNORMAL
MONOCYTES ABSOLUTE: 0.8 THOU/MM3 (ref 0.4–1.3)
MONOCYTES NFR BLD AUTO: 8 %
NEUTROPHILS ABSOLUTE: 7.2 THOU/MM3 (ref 1.8–7.7)
NEUTROPHILS NFR BLD AUTO: 76.6 %
NITRITE UR QL STRIP: NEGATIVE
NRBC BLD AUTO-RTO: 0 /100 WBC
OSMOLALITY SERPL CALC.SUM OF ELEC: 288.4 MOSMOL/KG (ref 275–300)
PH UR STRIP.AUTO: 6.5 [PH] (ref 5–9)
PLATELET # BLD AUTO: 168 THOU/MM3 (ref 130–400)
PMV BLD AUTO: 11.1 FL (ref 9.4–12.4)
POTASSIUM SERPL-SCNC: 4.7 MEQ/L (ref 3.5–5.2)
PROT SERPL-MCNC: 6.4 G/DL (ref 6.4–8.3)
PROT UR STRIP.AUTO-MCNC: >= 300 MG/DL
PSA SERPL-MCNC: 7.93 NG/ML (ref 0–1)
RBC # BLD AUTO: 4.15 MILL/MM3 (ref 4.7–6.1)
RBC URINE: ABNORMAL /HPF
RENAL EPI CELLS #/AREA URNS HPF: ABNORMAL /[HPF]
SODIUM SERPL-SCNC: 143 MEQ/L (ref 135–145)
SP GR UR REFRACT.AUTO: 1.02 (ref 1–1.03)
UROBILINOGEN, URINE: 1 EU/DL (ref 0–1)
WBC # BLD AUTO: 9.4 THOU/MM3 (ref 4.8–10.8)
WBC #/AREA URNS HPF: > 100 /HPF
WBC #/AREA URNS HPF: ABNORMAL /[HPF]
YEAST LIKE FUNGI URNS QL MICRO: ABNORMAL

## 2025-09-01 PROCEDURE — 81001 URINALYSIS AUTO W/SCOPE: CPT

## 2025-09-01 PROCEDURE — G0103 PSA SCREENING: HCPCS

## 2025-09-01 PROCEDURE — 2500000003 HC RX 250 WO HCPCS

## 2025-09-01 PROCEDURE — 87086 URINE CULTURE/COLONY COUNT: CPT

## 2025-09-01 PROCEDURE — 6360000002 HC RX W HCPCS

## 2025-09-01 PROCEDURE — 85025 COMPLETE CBC W/AUTO DIFF WBC: CPT

## 2025-09-01 PROCEDURE — 80053 COMPREHEN METABOLIC PANEL: CPT

## 2025-09-01 PROCEDURE — 36415 COLL VENOUS BLD VENIPUNCTURE: CPT

## 2025-09-01 RX ORDER — SULFAMETHOXAZOLE AND TRIMETHOPRIM 800; 160 MG/1; MG/1
1 TABLET ORAL 2 TIMES DAILY
Qty: 14 TABLET | Refills: 0 | Status: ON HOLD | OUTPATIENT
Start: 2025-09-01 | End: 2025-09-08

## 2025-09-01 RX ADMIN — WATER 1000 MG: 1 INJECTION INTRAMUSCULAR; INTRAVENOUS; SUBCUTANEOUS at 18:18

## 2025-09-03 ENCOUNTER — ANESTHESIA (OUTPATIENT)
Dept: OPERATING ROOM | Age: 75
End: 2025-09-03
Payer: MEDICARE

## 2025-09-03 ENCOUNTER — ANESTHESIA EVENT (OUTPATIENT)
Dept: OPERATING ROOM | Age: 75
End: 2025-09-03
Payer: MEDICARE

## 2025-09-03 PROBLEM — J98.9 AIRWAY PROBLEM: Status: ACTIVE | Noted: 2025-09-03

## 2025-09-03 LAB — BACTERIA UR CULT: NORMAL

## 2025-09-03 PROCEDURE — 6370000000 HC RX 637 (ALT 250 FOR IP): Performed by: NURSE ANESTHETIST, CERTIFIED REGISTERED

## 2025-09-03 PROCEDURE — 6360000002 HC RX W HCPCS: Performed by: NURSE ANESTHETIST, CERTIFIED REGISTERED

## 2025-09-03 PROCEDURE — 6360000002 HC RX W HCPCS: Performed by: OTOLARYNGOLOGY

## 2025-09-03 PROCEDURE — 2580000003 HC RX 258: Performed by: NURSE ANESTHETIST, CERTIFIED REGISTERED

## 2025-09-03 PROCEDURE — 2580000003 HC RX 258: Performed by: OTOLARYNGOLOGY

## 2025-09-03 PROCEDURE — 2500000003 HC RX 250 WO HCPCS: Performed by: NURSE ANESTHETIST, CERTIFIED REGISTERED

## 2025-09-03 RX ORDER — PROPOFOL 10 MG/ML
INJECTION, EMULSION INTRAVENOUS
Status: DISCONTINUED | OUTPATIENT
Start: 2025-09-03 | End: 2025-09-03 | Stop reason: SDUPTHER

## 2025-09-03 RX ORDER — SODIUM CHLORIDE 9 MG/ML
INJECTION, SOLUTION INTRAVENOUS
Status: DISCONTINUED | OUTPATIENT
Start: 2025-09-03 | End: 2025-09-03 | Stop reason: SDUPTHER

## 2025-09-03 RX ORDER — ROCURONIUM BROMIDE 10 MG/ML
INJECTION, SOLUTION INTRAVENOUS
Status: DISCONTINUED | OUTPATIENT
Start: 2025-09-03 | End: 2025-09-03 | Stop reason: SDUPTHER

## 2025-09-03 RX ORDER — MIDAZOLAM HYDROCHLORIDE 1 MG/ML
INJECTION, SOLUTION INTRAMUSCULAR; INTRAVENOUS
Status: DISCONTINUED | OUTPATIENT
Start: 2025-09-03 | End: 2025-09-03 | Stop reason: SDUPTHER

## 2025-09-03 RX ORDER — LIDOCAINE HYDROCHLORIDE 20 MG/ML
INJECTION, SOLUTION INTRAVENOUS
Status: DISCONTINUED | OUTPATIENT
Start: 2025-09-03 | End: 2025-09-03 | Stop reason: SDUPTHER

## 2025-09-03 RX ORDER — LIDOCAINE HYDROCHLORIDE 40 MG/ML
SOLUTION TOPICAL
Status: DISCONTINUED | OUTPATIENT
Start: 2025-09-03 | End: 2025-09-03 | Stop reason: SDUPTHER

## 2025-09-03 RX ORDER — ONDANSETRON 2 MG/ML
INJECTION INTRAMUSCULAR; INTRAVENOUS
Status: DISCONTINUED | OUTPATIENT
Start: 2025-09-03 | End: 2025-09-03 | Stop reason: SDUPTHER

## 2025-09-03 RX ORDER — FENTANYL CITRATE 50 UG/ML
INJECTION, SOLUTION INTRAMUSCULAR; INTRAVENOUS
Status: DISCONTINUED | OUTPATIENT
Start: 2025-09-03 | End: 2025-09-03 | Stop reason: SDUPTHER

## 2025-09-03 RX ADMIN — ROCURONIUM BROMIDE 30 MG: 10 INJECTION, SOLUTION INTRAVENOUS at 15:56

## 2025-09-03 RX ADMIN — PROPOFOL 150 MCG/KG/MIN: 10 INJECTION, EMULSION INTRAVENOUS at 15:14

## 2025-09-03 RX ADMIN — ONDANSETRON 4 MG: 2 INJECTION, SOLUTION INTRAMUSCULAR; INTRAVENOUS at 15:31

## 2025-09-03 RX ADMIN — SODIUM CHLORIDE: 9 INJECTION, SOLUTION INTRAVENOUS at 15:04

## 2025-09-03 RX ADMIN — PIPERACILLIN SODIUM AND TAZOBACTAM SODIUM 3375 MG: 3; .375 INJECTION, POWDER, LYOPHILIZED, FOR SOLUTION INTRAVENOUS at 15:18

## 2025-09-03 RX ADMIN — ROCURONIUM BROMIDE 50 MG: 10 INJECTION, SOLUTION INTRAVENOUS at 15:08

## 2025-09-03 RX ADMIN — LIDOCAINE HYDROCHLORIDE 4 ML: 40 SOLUTION TOPICAL at 15:10

## 2025-09-03 RX ADMIN — PROPOFOL 150 MG: 10 INJECTION, EMULSION INTRAVENOUS at 15:08

## 2025-09-03 RX ADMIN — SUGAMMADEX 200 MG: 100 INJECTION, SOLUTION INTRAVENOUS at 16:19

## 2025-09-03 RX ADMIN — Medication 100 MG: at 15:08

## 2025-09-03 RX ADMIN — FENTANYL CITRATE 50 MCG: 50 INJECTION, SOLUTION INTRAMUSCULAR; INTRAVENOUS at 16:11

## 2025-09-03 RX ADMIN — FENTANYL CITRATE 100 MCG: 50 INJECTION, SOLUTION INTRAMUSCULAR; INTRAVENOUS at 15:08

## 2025-09-03 RX ADMIN — FENTANYL CITRATE 50 MCG: 50 INJECTION, SOLUTION INTRAMUSCULAR; INTRAVENOUS at 15:48

## 2025-09-03 RX ADMIN — MIDAZOLAM 2 MG: 1 INJECTION INTRAMUSCULAR; INTRAVENOUS at 15:04

## 2025-09-03 RX ADMIN — PROPOFOL 50 MG: 10 INJECTION, EMULSION INTRAVENOUS at 15:29

## 2025-09-03 ASSESSMENT — ENCOUNTER SYMPTOMS
DYSPNEA ACTIVITY LEVEL: AFTER AMBULATING 1 FLIGHT OF STAIRS
SHORTNESS OF BREATH: 1

## 2025-09-03 ASSESSMENT — LIFESTYLE VARIABLES: SMOKING_STATUS: 1

## 2025-09-04 ENCOUNTER — TELEPHONE (OUTPATIENT)
Dept: UROLOGY | Age: 75
End: 2025-09-04

## (undated) DEVICE — Device

## (undated) DEVICE — SUTURE PROL SZ 2-0 L18IN NONABSORBABLE BLU FS L26MM 3/8 CIR 8685H

## (undated) DEVICE — UNIVERSAL MONOPOLAR LAPAROSCOPIC CABLE 10FT, 4MM PIN CONNECTOR: Brand: CONMED

## (undated) DEVICE — PACK-MAJOR

## (undated) DEVICE — LARYNGOSCOPY - BRONCHOSCOPY: Brand: MEDLINE INDUSTRIES, INC.

## (undated) DEVICE — CLIP LIG M TI 6 SIL HNDL FOR OPN AND ENDOSCP SGL APPL

## (undated) DEVICE — GLOVE ORTHO 8   MSG9480

## (undated) DEVICE — GLOVE SURG SZ 7.5 L11.73IN FNGR THK9.8MIL STRW LTX POLYMER

## (undated) DEVICE — FLEXIBLE ENDOSCOPE AND SPECIMEN SAMPLING SYSTEM: Brand: ASCOPE™ 5 BRONCHO HD 5.6/2.8 SAMPLER SET

## (undated) DEVICE — TUBING, SUCTION, 1/4" X 20', STRAIGHT: Brand: MEDLINE INDUSTRIES, INC.

## (undated) DEVICE — NEEDLE, QUINCKE, 22GX7": Brand: MEDLINE